# Patient Record
Sex: FEMALE | Race: WHITE | NOT HISPANIC OR LATINO | Employment: OTHER | URBAN - METROPOLITAN AREA
[De-identification: names, ages, dates, MRNs, and addresses within clinical notes are randomized per-mention and may not be internally consistent; named-entity substitution may affect disease eponyms.]

---

## 2017-01-31 ENCOUNTER — TRANSCRIBE ORDERS (OUTPATIENT)
Dept: LAB | Facility: CLINIC | Age: 52
End: 2017-01-31

## 2017-01-31 ENCOUNTER — APPOINTMENT (OUTPATIENT)
Dept: LAB | Facility: CLINIC | Age: 52
End: 2017-01-31
Payer: COMMERCIAL

## 2017-01-31 DIAGNOSIS — E03.9 HYPOTHYROIDISM: ICD-10-CM

## 2017-01-31 DIAGNOSIS — I42.9 CARDIOMYOPATHY (HCC): ICD-10-CM

## 2017-01-31 LAB
ALBUMIN SERPL BCP-MCNC: 3.7 G/DL (ref 3.5–5)
ALP SERPL-CCNC: 42 U/L (ref 46–116)
ALT SERPL W P-5'-P-CCNC: 24 U/L (ref 12–78)
ANION GAP SERPL CALCULATED.3IONS-SCNC: 7 MMOL/L (ref 4–13)
AST SERPL W P-5'-P-CCNC: 15 U/L (ref 5–45)
BASOPHILS # BLD AUTO: 0.06 THOUSANDS/ΜL (ref 0–0.1)
BASOPHILS NFR BLD AUTO: 1 % (ref 0–1)
BILIRUB SERPL-MCNC: 0.51 MG/DL (ref 0.2–1)
BUN SERPL-MCNC: 12 MG/DL (ref 5–25)
CALCIUM SERPL-MCNC: 8.5 MG/DL (ref 8.3–10.1)
CHLORIDE SERPL-SCNC: 105 MMOL/L (ref 100–108)
CO2 SERPL-SCNC: 26 MMOL/L (ref 21–32)
CREAT SERPL-MCNC: 0.71 MG/DL (ref 0.6–1.3)
EOSINOPHIL # BLD AUTO: 0.29 THOUSAND/ΜL (ref 0–0.61)
EOSINOPHIL NFR BLD AUTO: 3 % (ref 0–6)
ERYTHROCYTE [DISTWIDTH] IN BLOOD BY AUTOMATED COUNT: 13.2 % (ref 11.6–15.1)
GFR SERPL CREATININE-BSD FRML MDRD: >60 ML/MIN/1.73SQ M
GLUCOSE SERPL-MCNC: 80 MG/DL (ref 65–140)
HCT VFR BLD AUTO: 38.1 % (ref 34.8–46.1)
HGB BLD-MCNC: 12.5 G/DL (ref 11.5–15.4)
LYMPHOCYTES # BLD AUTO: 2.29 THOUSANDS/ΜL (ref 0.6–4.47)
LYMPHOCYTES NFR BLD AUTO: 23 % (ref 14–44)
MCH RBC QN AUTO: 31.4 PG (ref 26.8–34.3)
MCHC RBC AUTO-ENTMCNC: 32.8 G/DL (ref 31.4–37.4)
MCV RBC AUTO: 96 FL (ref 82–98)
MONOCYTES # BLD AUTO: 1.04 THOUSAND/ΜL (ref 0.17–1.22)
MONOCYTES NFR BLD AUTO: 10 % (ref 4–12)
NEUTROPHILS # BLD AUTO: 6.46 THOUSANDS/ΜL (ref 1.85–7.62)
NEUTS SEG NFR BLD AUTO: 63 % (ref 43–75)
NRBC BLD AUTO-RTO: 0 /100 WBCS
PLATELET # BLD AUTO: 354 THOUSANDS/UL (ref 149–390)
PMV BLD AUTO: 11.3 FL (ref 8.9–12.7)
POTASSIUM SERPL-SCNC: 3.6 MMOL/L (ref 3.5–5.3)
PROT SERPL-MCNC: 7.6 G/DL (ref 6.4–8.2)
RBC # BLD AUTO: 3.98 MILLION/UL (ref 3.81–5.12)
SODIUM SERPL-SCNC: 138 MMOL/L (ref 136–145)
TSH SERPL DL<=0.05 MIU/L-ACNC: 2.51 UIU/ML (ref 0.36–3.74)
WBC # BLD AUTO: 10.16 THOUSAND/UL (ref 4.31–10.16)

## 2017-01-31 PROCEDURE — 80053 COMPREHEN METABOLIC PANEL: CPT

## 2017-01-31 PROCEDURE — 36415 COLL VENOUS BLD VENIPUNCTURE: CPT

## 2017-01-31 PROCEDURE — 84443 ASSAY THYROID STIM HORMONE: CPT

## 2017-01-31 PROCEDURE — 85025 COMPLETE CBC W/AUTO DIFF WBC: CPT

## 2017-02-06 ENCOUNTER — APPOINTMENT (OUTPATIENT)
Dept: ULTRASOUND IMAGING | Facility: CLINIC | Age: 52
End: 2017-02-06
Payer: COMMERCIAL

## 2017-02-10 ENCOUNTER — ALLSCRIPTS OFFICE VISIT (OUTPATIENT)
Dept: OTHER | Facility: OTHER | Age: 52
End: 2017-02-10

## 2017-07-13 ENCOUNTER — ALLSCRIPTS OFFICE VISIT (OUTPATIENT)
Dept: OTHER | Facility: OTHER | Age: 52
End: 2017-07-13

## 2017-07-13 DIAGNOSIS — E03.9 HYPOTHYROIDISM: ICD-10-CM

## 2017-07-13 DIAGNOSIS — I42.9 CARDIOMYOPATHY (HCC): ICD-10-CM

## 2017-07-14 ENCOUNTER — TRANSCRIBE ORDERS (OUTPATIENT)
Dept: ADMINISTRATIVE | Facility: HOSPITAL | Age: 52
End: 2017-07-14

## 2017-07-14 DIAGNOSIS — I42.9 CARDIOMYOPATHY, UNSPECIFIED TYPE (HCC): Primary | ICD-10-CM

## 2017-07-17 ENCOUNTER — ALLSCRIPTS OFFICE VISIT (OUTPATIENT)
Dept: OTHER | Facility: OTHER | Age: 52
End: 2017-07-17

## 2017-08-01 DIAGNOSIS — I42.9 CARDIOMYOPATHY (HCC): ICD-10-CM

## 2017-08-01 DIAGNOSIS — E03.9 HYPOTHYROIDISM: ICD-10-CM

## 2017-08-03 ENCOUNTER — HOSPITAL ENCOUNTER (OUTPATIENT)
Dept: NON INVASIVE DIAGNOSTICS | Facility: CLINIC | Age: 52
Discharge: HOME | End: 2017-08-03
Payer: COMMERCIAL

## 2017-08-03 DIAGNOSIS — I42.9 CARDIOMYOPATHY (HCC): ICD-10-CM

## 2017-08-03 PROCEDURE — 93306 TTE W/DOPPLER COMPLETE: CPT

## 2017-09-14 ENCOUNTER — GENERIC CONVERSION - ENCOUNTER (OUTPATIENT)
Dept: OTHER | Facility: OTHER | Age: 52
End: 2017-09-14

## 2017-09-16 ENCOUNTER — APPOINTMENT (OUTPATIENT)
Dept: LAB | Facility: CLINIC | Age: 52
End: 2017-09-16
Payer: COMMERCIAL

## 2018-01-12 VITALS — DIASTOLIC BLOOD PRESSURE: 72 MMHG | SYSTOLIC BLOOD PRESSURE: 120 MMHG | HEIGHT: 64 IN

## 2018-01-12 VITALS
SYSTOLIC BLOOD PRESSURE: 102 MMHG | DIASTOLIC BLOOD PRESSURE: 70 MMHG | HEART RATE: 72 BPM | WEIGHT: 149 LBS | BODY MASS INDEX: 25.44 KG/M2 | HEIGHT: 64 IN

## 2018-01-12 NOTE — MISCELLANEOUS
Message   Recorded as Task   Date: 07/03/2016 07:33 AM, Created By: Samara Gamez   Task Name: Go to Result   Assigned To: Arlette Lange   Regarding Patient: Johanny Ayon, Status: In Progress   Keshia Stanley - 03 Jul 2016 7:33 AM     TASK CREATED  please call Becky Marin- all biopsies are normal   no evidence of pre cancerous lesions  plan for pap smeare next year   [facility] - 05 Jul 2016 8:58 AM     TASK IN PROGRESS   [facility] - 05 Jul 2016 8:59 AM     TASK EDITED  made pt aware  Active Problems    1  Acquired hypothyroidism (244 9) (E03 9)   2  Allergic rhinitis (477 9) (J30 9)   3  Asthma (493 90) (J45 909)   4  Cardiomyopathy (425 4) (I42 9)   5  Encounter for IUD removal (V25 12) (Z30 432)   6  Encounter for routine gynecological examination with Papanicolaou smear of cervix   (V72 31,V76 2) (Z01 419)   7  High risk HPV infection (079 4) (A63 0)   8  Insomnia (780 52) (G47 00)   9  Left bundle-branch block (426 3) (I44 7)   10  Mitral regurgitation (424 0) (I34 0)   11  Need for pneumococcal vaccine (V03 82) (Z23)   12  Non-toxic multinodular goiter (241 1) (E04 2)   13  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)   14  Screening for HPV (human papillomavirus) (V73 81) (Z11 51)    Current Meds   1  Coreg CR 10 MG Oral Capsule Extended Release 24 Hour; TAKE 1 CAPSULE EVERY   MORNING WITH FOOD  Requested for: 44VGK7384; Last EV:50GTD4063 Ordered   2  Dulera 100-5 MCG/ACT Inhalation Aerosol; INHALE 2 PUFFS TWICE DAILY; Therapy: 14QQZ9548 to (Last Rx:15Jan2016)  Requested for: 16CVW5007 Ordered   3  Levothyroxine Sodium 75 MCG Oral Tablet (Synthroid); Take 1 tablet daily  Requested   for: 37EFD2405; Last SQ:45HLZ2375 Ordered   4  Montelukast Sodium 10 MG Oral Tablet; take 1 tablet by mouth every day; Therapy: 45SIY2915 to (Evaluate:30Oct2016)  Requested for: 86STS0521; Last   Rx:03Eki2001 Ordered   5  Multi-Vitamin TABS;    Therapy: (Recorded:01Biw7165) to Recorded 6  ProAir  (90 Base) MCG/ACT Inhalation Aerosol Solution; INHALE 1 TO 2 PUFFS   EVERY 4 TO 6 HOURS AS NEEDED; Therapy: 46DMX7723 to (Last Rx:15Jan2016)  Requested for: 91TYQ4700 Ordered   7  Ramipril 2 5 MG Oral Capsule; TAKE 1 CAPSULE DAILY; Therapy: 78ODS3907 to ([facility])  Requested for: 14EEC3760; Last   TR:07AHI2043 Ordered    Allergies    1   No Known Drug Allergies    Signatures   Electronically signed by : Kristy Pierce LPN; Jul 5 4186  7:41CX EST                       (Author)

## 2018-01-14 VITALS
HEIGHT: 64 IN | WEIGHT: 148.56 LBS | SYSTOLIC BLOOD PRESSURE: 122 MMHG | DIASTOLIC BLOOD PRESSURE: 70 MMHG | BODY MASS INDEX: 25.36 KG/M2 | HEART RATE: 88 BPM

## 2018-01-14 NOTE — RESULT NOTES
Message  need colposcopy dud to hpv + x 2 years      Verified Results  (B) PAP DEPENDENT HPV COTEST >= 27YEARS OLD [id number] 03:17PM [place]     Test Name Result Flag Reference   PAP, LIQUID-BASED NILM     DIAGNOSIS:            Negative for intraepithelial lesion or malignancy  ADEQUACY:             Satisfactory for evaluation /                         Endocervical/transformation zone component                         present  COMMENT:              This Pap smear was screened with the assistance                         of the [employer] OutdoorPrep(TM) Imaging System and                         screened by a cytotechnologist   SPECIMEN SOURCE:      Pap Dependent HPV Cotest >= 27years old, CERVIX  CLINICAL INFORMATION: LMP: 5/04/2016                        Provided Diagnosis Codes: Z01 419,Z11 51                                                Cervicovaginal cytology should be considered a                         screening procedure subject to false negatives                         and false positives  Results are more reliable                         when a satisfactory sample is obtained on a                         regular repetitive basis, and should be                         interpreted together with past and current                         clinical data  ELECTRONICALLY SIGNED   BY:                   Rescreened By: AMITA Hurley (ASCP)   Case                         Electronically Signed 05/24/2016                          CASE COMMENTS:        The specificity and positive predictive value of                         high risk HPV for FREIDA 2+ on biopsy varies                         according to different studies; in one study it                         is approximately 30-40%, depending on the                         population juan jose Neumann, Cancer                         [employer], Nov  2008)                             Therefore, cytology may be negative in the setting of HPV infection  Follow up as per ASCCP                         guidelines  HPV HR (NON 16/18) Not Detected     HPV 18+ Not Detected     HPV16+ Detected A    HPV HR(NON 16/18) (1,2,3,4)  HPV 18+ (1,2,3,4)  HPV16+ (1,3,4)  (1)  The bhavin(R) HPV test is FDA-cleared for ThinPrep(R) specimens and   detects genomic HPV DNA in the polymorphic L1 region in 14 subtypes:   Type 16, Type 18, and other high risk types   (87,89,32,13,27,24,93,99,89,37,69,40)  The test has been modified and   validated for use in SurePath(TM) specimens  (2)  HPV types 16 and/or 18 that were Not Detected were undetectable or   below the pre-set threshold  (3)  The non-repeat rate for HPV genotyping assays varies from 5 to 15%  In   the NILM cytology category, there is a low positive predictive value   (PPV = 15-20%) for CIN2+ with a positive high risk HPV result  (4)  Results should be interpreted together with past and current clinical   and laboratory data         Signatures   Electronically signed by : Luciana Okeefe MD; May 25 2016  8:52AM EST                       (Author)

## 2018-01-16 NOTE — RESULT NOTES
Verified Results  (1) TSH [id number] 08:29AM Travisrj Gaspar Order Number: [id number]     Test Name Result Flag Reference   TSH 1 750 uIU/mL  0 358-3 740   Patients undergoing fluorescein dye angiography may retain small amounts of fluorescein in the body for 48-72 hours post procedure  Samples containing fluorescein can produce falsely depressed TSH values  If the patient had this procedure,a specimen should be resubmitted post fluorescein clearance            The recommended reference ranges for TSH during pregnancy are as follows:  First trimester 0 1 to 2 5 uIU/mL  Second trimester  0 2 to 3 0 uIU/mL  Third trimester 0 3 to 3 0 uIU/m

## 2018-03-07 NOTE — PROGRESS NOTES
History of Present Illness    Revaccination   Vaccine Information: Vaccine(s) Given (names): pneumovax  Spoke with patient regarding vaccine out of temperature range and risks and benefits of revaccination  Action(s): Pt will be revaccinated  Revaccination Completed: [id number]  Active Problems    1  Acquired hypothyroidism (244 9) (E03 9)   2  Allergic rhinitis (477 9) (J30 9)   3  Asthma (493 90) (J45 909)   4  Cardiomyopathy (425 4) (I42 9)   5  Encounter for IUD removal (V25 12) (Z30 432)   6  Encounter for routine gynecological examination with Papanicolaou smear of cervix   (V72 31,V76 2) (Z01 419)   7  High risk HPV infection (079 4) (A63 0)   8  Insomnia (780 52) (G47 00)   9  Left bundle-branch block (426 3) (I44 7)   10  Need for pneumococcal vaccine (V03 82) (Z23)   11  Need for revaccination (V05 9) (Z23)   12  Nonrheumatic mitral valve regurgitation (424 0) (I34 0)   13  Non-toxic multinodular goiter (241 1) (E04 2)   14  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)   15  Screening for HPV (human papillomavirus) (V73 81) (Z11 51)    Immunizations  Influenza --- Gypsy Lonnie: 01-Sep-2015   PCV --- Gypsy Lonnie: 10-Joaquin-2016   PPSV --- Gypsy Lonnie: 04-Feb-2016; Series2: Pt states 10+ yrs ago   Tdap --- Gypsy Lonnie: Unknown     Current Meds   1  Coreg CR 10 MG Oral Capsule Extended Release 24 Hour; TAKE 1 CAPSULE EVERY   MORNING WITH FOOD   2  Dulera 100-5 MCG/ACT Inhalation Aerosol; INHALE 2 PUFFS TWICE DAILY   3  Levothyroxine Sodium 75 MCG Oral Tablet; Take 1 tablet daily   4  Lisinopril 2 5 MG Oral Tablet; take one tablet by mouth every day   5  Montelukast Sodium 10 MG Oral Tablet; take 1 tablet by mouth every day   6  Multi-Vitamin TABS   7  ProAir  (90 Base) MCG/ACT Inhalation Aerosol Solution; INHALE 1 TO 2 PUFFS   EVERY 4 TO 6 HOURS AS NEEDED    Allergies    1  No Known Drug Allergies    Vitals  Signs   Recorded: 54WAP5802 03:43PM   Temperature: 98 1 F    Plan    1   RVAC-Pneumovax 23 25 MCG/0 5ML Injection Injectable    Education  Education Items with no Session   RVAC-Pneumovax 23 25 MCG/0 5ML Injection Injectable;  Provided: 26XZF4177  10:25AM; Counselor: Dwaine Borrego; Future Appointments    Date/Time Provider Specialty Site   06/09/2017 08:30 AM PARAG Villarreal  Cardiology [facility]   02/10/2017 10:15 AM PARAG Grimm   [facility] [address] [facility]   06/07/2017 09:45 AM Joyce Padron MD Obstetrics/Gynecology [facility]     Signatures   Electronically signed by : PARAG Fontana ; Dec 21 2016 10:55AM EST

## 2018-03-09 DIAGNOSIS — I10 HYPERTENSION, UNSPECIFIED TYPE: Primary | ICD-10-CM

## 2018-03-09 RX ORDER — LISINOPRIL 2.5 MG/1
2.5 TABLET ORAL DAILY
Qty: 90 TABLET | Refills: 0 | Status: SHIPPED | OUTPATIENT
Start: 2018-03-09

## 2018-03-09 NOTE — TELEPHONE ENCOUNTER
Fax requesting lisinopril  OLD jb6 patient has not established with new cardio  No appt scheduled  Pending 90w/0 refills  So patient can schedule f/u with new cardiologist

## 2018-07-23 RX ORDER — MONTELUKAST SODIUM 10 MG/1
TABLET ORAL
Qty: 90 TABLET | Refills: 3 | OUTPATIENT
Start: 2018-07-23

## 2021-04-13 DIAGNOSIS — Z23 ENCOUNTER FOR IMMUNIZATION: ICD-10-CM

## 2022-12-11 ENCOUNTER — APPOINTMENT (EMERGENCY)
Dept: CT IMAGING | Facility: HOSPITAL | Age: 57
End: 2022-12-11

## 2022-12-11 ENCOUNTER — HOSPITAL ENCOUNTER (EMERGENCY)
Facility: HOSPITAL | Age: 57
Discharge: HOME | End: 2022-12-11
Attending: EMERGENCY MEDICINE

## 2022-12-11 ENCOUNTER — APPOINTMENT (EMERGENCY)
Dept: RADIOLOGY | Facility: HOSPITAL | Age: 57
End: 2022-12-11

## 2022-12-11 VITALS
HEART RATE: 90 BPM | SYSTOLIC BLOOD PRESSURE: 139 MMHG | HEIGHT: 66 IN | TEMPERATURE: 98.9 F | DIASTOLIC BLOOD PRESSURE: 70 MMHG | RESPIRATION RATE: 16 BRPM | WEIGHT: 147 LBS | BODY MASS INDEX: 23.63 KG/M2 | OXYGEN SATURATION: 99 %

## 2022-12-11 DIAGNOSIS — R07.9 CHEST PAIN: Primary | ICD-10-CM

## 2022-12-11 LAB
2HR DELTA HS TROPONIN: 1 NG/L
ALBUMIN SERPL BCP-MCNC: 4.3 G/DL (ref 3.5–5)
ALP SERPL-CCNC: 64 U/L (ref 34–104)
ALT SERPL W P-5'-P-CCNC: 13 U/L (ref 7–52)
ANION GAP SERPL CALCULATED.3IONS-SCNC: 1 MMOL/L (ref 4–13)
APTT PPP: 26 SECONDS (ref 23–37)
AST SERPL W P-5'-P-CCNC: 17 U/L (ref 13–39)
ATRIAL RATE: 93 BPM
BASOPHILS # BLD AUTO: 0.11 THOUSANDS/ÂΜL (ref 0–0.1)
BASOPHILS NFR BLD AUTO: 1 % (ref 0–1)
BILIRUB SERPL-MCNC: 0.37 MG/DL (ref 0.2–1)
BNP SERPL-MCNC: 124 PG/ML (ref 0–100)
BUN SERPL-MCNC: 16 MG/DL (ref 5–25)
CALCIUM SERPL-MCNC: 9.8 MG/DL (ref 8.4–10.2)
CARDIAC TROPONIN I PNL SERPL HS: 4 NG/L
CARDIAC TROPONIN I PNL SERPL HS: 5 NG/L
CHLORIDE SERPL-SCNC: 108 MMOL/L (ref 96–108)
CO2 SERPL-SCNC: 28 MMOL/L (ref 21–32)
CREAT SERPL-MCNC: 0.9 MG/DL (ref 0.6–1.3)
D DIMER PPP FEU-MCNC: 0.75 UG/ML FEU
EOSINOPHIL # BLD AUTO: 0.34 THOUSAND/ÂΜL (ref 0–0.61)
EOSINOPHIL NFR BLD AUTO: 4 % (ref 0–6)
ERYTHROCYTE [DISTWIDTH] IN BLOOD BY AUTOMATED COUNT: 13.2 % (ref 11.6–15.1)
FLUAV RNA RESP QL NAA+PROBE: NEGATIVE
FLUBV RNA RESP QL NAA+PROBE: NEGATIVE
GFR SERPL CREATININE-BSD FRML MDRD: 71 ML/MIN/1.73SQ M
GLUCOSE SERPL-MCNC: 93 MG/DL (ref 65–140)
HCT VFR BLD AUTO: 38.7 % (ref 34.8–46.1)
HGB BLD-MCNC: 12.4 G/DL (ref 11.5–15.4)
IMM GRANULOCYTES # BLD AUTO: 0.01 THOUSAND/UL (ref 0–0.2)
IMM GRANULOCYTES NFR BLD AUTO: 0 % (ref 0–2)
INR PPP: 1 (ref 0.84–1.19)
LIPASE SERPL-CCNC: 8 U/L (ref 11–82)
LYMPHOCYTES # BLD AUTO: 2.79 THOUSANDS/ÂΜL (ref 0.6–4.47)
LYMPHOCYTES NFR BLD AUTO: 34 % (ref 14–44)
MCH RBC QN AUTO: 30.6 PG (ref 26.8–34.3)
MCHC RBC AUTO-ENTMCNC: 32 G/DL (ref 31.4–37.4)
MCV RBC AUTO: 96 FL (ref 82–98)
MONOCYTES # BLD AUTO: 0.85 THOUSAND/ÂΜL (ref 0.17–1.22)
MONOCYTES NFR BLD AUTO: 10 % (ref 4–12)
NEUTROPHILS # BLD AUTO: 4.16 THOUSANDS/ÂΜL (ref 1.85–7.62)
NEUTS SEG NFR BLD AUTO: 51 % (ref 43–75)
NRBC BLD AUTO-RTO: 0 /100 WBCS
P AXIS: 85 DEGREES
PLATELET # BLD AUTO: 332 THOUSANDS/UL (ref 149–390)
PMV BLD AUTO: 10.6 FL (ref 8.9–12.7)
POTASSIUM SERPL-SCNC: 4 MMOL/L (ref 3.5–5.3)
PR INTERVAL: 200 MS
PROT SERPL-MCNC: 7.6 G/DL (ref 6.4–8.4)
PROTHROMBIN TIME: 13.4 SECONDS (ref 11.6–14.5)
QRS AXIS: -22 DEGREES
QRSD INTERVAL: 86 MS
QT INTERVAL: 356 MS
QTC INTERVAL: 442 MS
RBC # BLD AUTO: 4.05 MILLION/UL (ref 3.81–5.12)
RSV RNA RESP QL NAA+PROBE: NEGATIVE
SARS-COV-2 RNA RESP QL NAA+PROBE: NEGATIVE
SODIUM SERPL-SCNC: 137 MMOL/L (ref 135–147)
T WAVE AXIS: 30 DEGREES
VENTRICULAR RATE: 93 BPM
WBC # BLD AUTO: 8.26 THOUSAND/UL (ref 4.31–10.16)

## 2022-12-11 RX ORDER — CARVEDILOL 12.5 MG/1
12.5 TABLET ORAL 2 TIMES DAILY
COMMUNITY
Start: 2022-09-06

## 2022-12-11 RX ORDER — LISINOPRIL 2.5 MG/1
1 TABLET ORAL 2 TIMES DAILY
COMMUNITY
Start: 2016-12-09

## 2022-12-11 RX ORDER — MONTELUKAST SODIUM 10 MG/1
1 TABLET ORAL
COMMUNITY
Start: 2016-02-03

## 2022-12-11 RX ORDER — FEXOFENADINE HCL AND PSEUDOEPHEDRINE HCI 60; 120 MG/1; MG/1
1 TABLET, EXTENDED RELEASE ORAL 2 TIMES DAILY
COMMUNITY

## 2022-12-11 RX ORDER — ALBUTEROL SULFATE 90 UG/1
2 AEROSOL, METERED RESPIRATORY (INHALATION) EVERY 6 HOURS PRN
COMMUNITY
Start: 2016-01-15

## 2022-12-11 RX ORDER — LEVOTHYROXINE SODIUM 0.07 MG/1
1 TABLET ORAL DAILY
COMMUNITY
Start: 2017-07-19

## 2022-12-11 RX ADMIN — IOHEXOL 100 ML: 350 INJECTION, SOLUTION INTRAVENOUS at 17:25

## 2022-12-11 NOTE — ED PROVIDER NOTES
History  Chief Complaint   Patient presents with   • Chest Pain     Patient reports chest pressure and pain starting yesterday afternoon  Hx cardiomyopathy  with pacemaker placement  No meds pta  Reports pain is sharp, squeezing, and intermittent  Increasing in intensity throughout this evening  63yo F w/ h/o HFrEF, cardiomyopathy, pacemaker, PE, CA, and asthma presenting for CP  1d ago, while at rest, Pt developed sharp/squeezing subxyphoid CP radiating to the back/right shoulder lasting <20s before self resolving  Associated with SOB, diaphoresis, nausea, lightheadedness  Finds her CP and SOB worse with laying flat, and deep breathing  Denies trauma, F/C, h/o abd surgeries, recent illness, sick contact, AC use, tobacco use, recent heavy lifting, worsening of her chronic leg swelling, calf pain  History provided by:  Patient      Prior to Admission Medications   Prescriptions Last Dose Informant Patient Reported? Taking? Multiple Vitamin (MULTI-DAY PO)   Yes Yes   Sig: Take 1 tablet by mouth in the morning   albuterol (PROVENTIL HFA,VENTOLIN HFA) 90 mcg/act inhaler   Yes Yes   Sig: Inhale 2 puffs every 6 (six) hours as needed   carvedilol (COREG) 12 5 mg tablet   Yes Yes   Sig: Take 12 5 mg by mouth 2 (two) times a day   fexofenadine-pseudoephedrine (ALLEGRA-D)  MG per tablet   Yes Yes   Sig: Take 1 tablet by mouth 2 (two) times a day   levothyroxine 75 mcg tablet   Yes Yes   Sig: Take 1 tablet by mouth daily   lisinopril (ZESTRIL) 2 5 mg tablet   Yes Yes   Sig: Take 1 tablet by mouth 2 (two) times a day   mometasone-formoterol (Dulera) 100-5 MCG/ACT inhaler   Yes Yes   Sig: Inhale 2 puffs 2 (two) times a day   montelukast (SINGULAIR) 10 mg tablet   Yes Yes   Sig: Take 1 tablet by mouth daily at bedtime      Facility-Administered Medications: None       Past Medical History:   Diagnosis Date   • Asthma    • Cancer ([facility] )    • Cardiomyopathy ([facility] )    • Pacemaker        History reviewed   No pertinent surgical history  History reviewed  No pertinent family history  I have reviewed and agree with the history as documented  E-Cigarette/Vaping     E-Cigarette/Vaping Substances     Social History     Tobacco Use   • Smoking status: Never   • Smokeless tobacco: Never   Substance Use Topics   • Alcohol use: Not Currently   • Drug use: Never        Review of Systems   Constitutional: Positive for diaphoresis  Negative for activity change, appetite change and fatigue  HENT: Negative  Eyes: Negative  Respiratory: Positive for shortness of breath  Negative for cough and chest tightness  Cardiovascular: Positive for chest pain  Negative for palpitations  Gastrointestinal: Positive for nausea  Genitourinary: Negative  Musculoskeletal: Negative  Skin: Negative  Neurological: Positive for light-headedness  Negative for weakness, numbness and headaches  Psychiatric/Behavioral: Negative  Physical Exam  ED Triage Vitals   Temperature Pulse Respirations Blood Pressure SpO2   12/11/22 1630 12/11/22 1559 12/11/22 1559 12/11/22 1559 12/11/22 1559   98 9 °F (37 2 °C) 100 20 147/76 99 %      Temp Source Heart Rate Source Patient Position - Orthostatic VS BP Location FiO2 (%)   12/11/22 1630 12/11/22 1559 12/11/22 1559 12/11/22 1559 --   Oral Monitor Sitting Right arm       Pain Score       12/11/22 1557       4             Orthostatic Vital Signs  Vitals:    12/11/22 1615 12/11/22 1630 12/11/22 1700 12/11/22 1830   BP: 144/72 133/61 113/56 139/70   Pulse: 93 82 83 90   Patient Position - Orthostatic VS: Sitting   Sitting       Physical Exam  Constitutional:       General: She is in acute distress  Appearance: Normal appearance  HENT:      Head: Normocephalic and atraumatic  Right Ear: External ear normal       Left Ear: External ear normal       Nose: Nose normal  No rhinorrhea  Mouth/Throat:      Mouth: Mucous membranes are moist       Pharynx: Oropharynx is clear     Eyes: Extraocular Movements: Extraocular movements intact  Conjunctiva/sclera: Conjunctivae normal       Pupils: Pupils are equal, round, and reactive to light  Cardiovascular:      Rate and Rhythm: Normal rate and regular rhythm  Pulses: Normal pulses  Heart sounds: Normal heart sounds  Pulmonary:      Effort: Pulmonary effort is normal       Breath sounds: Normal breath sounds  Chest:      Chest wall: Tenderness present  Abdominal:      General: Abdomen is flat  Tenderness: There is abdominal tenderness (epigastric)  Musculoskeletal:         General: No tenderness  Right lower leg: Edema (unchanged from baseline) present  Left lower leg: Edema (unchanged from baseline) present  Skin:     General: Skin is warm and dry  Capillary Refill: Capillary refill takes less than 2 seconds  Neurological:      General: No focal deficit present  Mental Status: She is alert and oriented to person, place, and time  Cranial Nerves: No cranial nerve deficit  Sensory: No sensory deficit  Motor: No weakness        Gait: Gait normal    Psychiatric:         Mood and Affect: Mood normal          Behavior: Behavior normal          ED Medications  Medications   iohexol (OMNIPAQUE) 350 MG/ML injection (MULTI-DOSE) 100 mL (100 mL Intravenous Given 12/11/22 1725)       Diagnostic Studies  Results Reviewed     Procedure Component Value Units Date/Time    HS Troponin I 2hr [[id number]]  (Normal) Collected: 12/11/22 1851    Lab Status: Final result Specimen: Blood from Arm, Left Updated: 12/11/22 1923     hs TnI 2hr 5 ng/L      Delta 2hr hsTnI 1 ng/L     B-Type Natriuretic Peptide(BNP) AN, CA, EA Campuses Only [[id number]]  (Abnormal) Collected: 12/11/22 1600    Lab Status: Final result Specimen: Blood from Arm, Left Updated: 12/11/22 1704      pg/mL     FLU/RSV/COVID - if FLU/RSV clinically relevant [[id number]]  (Normal) Collected: 12/11/22 1600    Lab Status: Final result Specimen: Nares from Nose Updated: 12/11/22 1702     SARS-CoV-2 Negative     INFLUENZA A PCR Negative     INFLUENZA B PCR Negative     RSV PCR Negative    Narrative:      FOR PEDIATRIC PATIENTS - copy/paste COVID Guidelines URL to browser: [email or web address]    SARS-CoV-2 assay is a Nucleic Acid Amplification assay intended for the  qualitative detection of nucleic acid from SARS-CoV-2 in nasopharyngeal  swabs  Results are for the presumptive identification of SARS-CoV-2 RNA  Positive results are indicative of infection with SARS-CoV-2, the virus  causing COVID-19, but do not rule out bacterial infection or co-infection  with other viruses  Laboratories within the [place] and its  territories are required to report all positive results to the appropriate  public health authorities  Negative results do not preclude SARS-CoV-2  infection and should not be used as the sole basis for treatment or other  patient management decisions  Negative results must be combined with  clinical observations, patient history, and epidemiological information  This test has not been FDA cleared or approved  This test has been authorized by FDA under an Emergency Use Authorization  (EUA)  This test is only authorized for the duration of time the  declaration that circumstances exist justifying the authorization of the  emergency use of an in vitro diagnostic tests for detection of SARS-CoV-2  virus and/or diagnosis of COVID-19 infection under section 564(b)(1) of  the Act, 21 U  S C  354QCP-9(C)(0), unless the authorization is terminated  or revoked sooner  The test has been validated but independent review by FDA  and CLIA is pending  Test performed using [employer] GeneXpert: This RT-PCR assay targets N2,  a region unique to SARS-CoV-2  A conserved region in the E-gene was chosen  for pan-Sarbecovirus detection which includes SARS-CoV-2      According to CMS-2020-01-R, this platform meets the definition of high-CoContest technology  Comprehensive metabolic panel [[id number]]  (Abnormal) Collected: 12/11/22 1600    Lab Status: Final result Specimen: Blood from Arm, Left Updated: 12/11/22 1652     Sodium 137 mmol/L      Potassium 4 0 mmol/L      Chloride 108 mmol/L      CO2 28 mmol/L      ANION GAP 1 mmol/L      BUN 16 mg/dL      Creatinine 0 90 mg/dL      Glucose 93 mg/dL      Calcium 9 8 mg/dL      AST 17 U/L      ALT 13 U/L      Alkaline Phosphatase 64 U/L      Total Protein 7 6 g/dL      Albumin 4 3 g/dL      Total Bilirubin 0 37 mg/dL      eGFR 71 ml/min/1 73sq m     Narrative:      [place] guidelines for Chronic Kidney Disease (CKD):   •  Stage 1 with normal or high GFR (GFR > 90 mL/min/1 73 square meters)  •  Stage 2 Mild CKD (GFR = 60-89 mL/min/1 73 square meters)  •  Stage 3A Moderate CKD (GFR = 45-59 mL/min/1 73 square meters)  •  Stage 3B Moderate CKD (GFR = 30-44 mL/min/1 73 square meters)  •  Stage 4 Severe CKD (GFR = 15-29 mL/min/1 73 square meters)  •  Stage 5 End Stage CKD (GFR <15 mL/min/1 73 square meters)  Note: GFR calculation is accurate only with a steady state creatinine    Lipase [[id number]]  (Abnormal) Collected: 12/11/22 1600    Lab Status: Final result Specimen: Blood from Arm, Left Updated: 12/11/22 1652     Lipase 8 u/L     D-Dimer [[id number]]  (Abnormal) Collected: 12/11/22 1600    Lab Status: Final result Specimen: Blood from Arm, Left Updated: 12/11/22 1652     D-Dimer, Quant 0 75 ug/ml FEU     Narrative: In the evaluation for possible pulmonary embolism, in the appropriate (Well's Score of 4 or less) patient, the age adjusted d-dimer cutoff for this patient can be calculated as:    Age x 0 01 (in ug/mL) for Age-adjusted D-dimer exclusion threshold for a patient over 50 years      HS Troponin 0hr (reflex protocol) [[id number]]  (Normal) Collected: 12/11/22 1600    Lab Status: Final result Specimen: Blood from Arm, Left Updated: 12/11/22 1635     hs TnI 0hr 4 ng/L     HS Troponin I 4hr [[id number]]     Lab Status: No result Specimen: Blood from Arm, Left     Protime-INR [[id number]]  (Normal) Collected: 12/11/22 1600    Lab Status: Final result Specimen: Blood from Arm, Left Updated: 12/11/22 1623     Protime 13 4 seconds      INR 1 00    APTT [[id number]]  (Normal) Collected: 12/11/22 1600    Lab Status: Final result Specimen: Blood from Arm, Left Updated: 12/11/22 1623     PTT 26 seconds     CBC and differential [[id number]]  (Abnormal) Collected: 12/11/22 1600    Lab Status: Final result Specimen: Blood from Arm, Left Updated: 12/11/22 1611     WBC 8 26 Thousand/uL      RBC 4 05 Million/uL      Hemoglobin 12 4 g/dL      Hematocrit 38 7 %      MCV 96 fL      MCH 30 6 pg      MCHC 32 0 g/dL      RDW 13 2 %      MPV 10 6 fL      Platelets 235 Thousands/uL      nRBC 0 /100 WBCs      Neutrophils Relative 51 %      Immat GRANS % 0 %      Lymphocytes Relative 34 %      Monocytes Relative 10 %      Eosinophils Relative 4 %      Basophils Relative 1 %      Neutrophils Absolute 4 16 Thousands/µL      Immature Grans Absolute 0 01 Thousand/uL      Lymphocytes Absolute 2 79 Thousands/µL      Monocytes Absolute 0 85 Thousand/µL      Eosinophils Absolute 0 34 Thousand/µL      Basophils Absolute 0 11 Thousands/µL                  CTA dissection protocol chest/abdomen/pelvis   Final Result by Geovany Howell MD (12/11 1824)      No evidence of aortic dissection or aortic aneurysm  Nonobstructing right renal calculus  Workstation performed: [id number]         XR chest 1 view portable   ED Interpretation by Funmilayo Medeiros MD (12/11 7209)   No evidence of acute cardiopulmonary pathology              Procedures  ECG 12 Lead Documentation Only    Date/Time: 12/11/2022 4:56 PM  Performed by: Funmilayo Medeiros MD  Authorized by: Funmilayo Medeiros MD     ECG reviewed by me, the ED Provider: yes    Patient location:  ED  Interpretation: Interpretation: abnormal    Rate:     ECG rate:  93    ECG rate assessment: normal    Rhythm:     Rhythm: paced    Pacing:     Capture:  Complete    Type of pacing:  Ventricular  Ectopy:     Ectopy: none    QRS:     QRS axis:  Normal    QRS intervals:  Normal  Conduction:     Conduction: abnormal      Abnormal conduction: incomplete RBBB    ST segments:     ST segments:  Normal  T waves:     T waves: normal            ED Course             HEART Risk Score    Flowsheet Row Most Recent Value   Heart Score Risk Calculator    History 1 Filed at: 12/11/2022 2057   ECG 0 Filed at: 12/11/2022 2057   Age 1 Filed at: 12/11/2022 2057   Risk Factors 2 Filed at: 12/11/2022 2057   Troponin 0 Filed at: 12/11/2022 2057   HEART Score 4 Filed at: 12/11/2022 2057                      SBIRT 20yo+    Flowsheet Row Most Recent Value   SBIRT (25 yo +)    In order to provide better care to our patients, we are screening all of our patients for alcohol and drug use  Would it be okay to ask you these screening questions? Unable to answer at this time Filed at: 12/11/2022 1603          Wells' Criteria for PE    Flowsheet Row Most Recent Value   Wells' Criteria for PE    Clinical signs and symptoms of DVT 3 Filed at: 12/11/2022 1620   PE is primary diagnosis or equally likely 0 Filed at: 12/11/2022 1620   HR >100 0 Filed at: 12/11/2022 1620   Immobilization at least 3 days or Surgery in the previous 4 weeks 0 Filed at: 12/11/2022 1620   Previous, objectively diagnosed PE or DVT 1 5 Filed at: 12/11/2022 1620   Hemoptysis 0 Filed at: 12/11/2022 1620   Malignancy with treatment within 6 months or palliative 0 Filed at: 12/11/2022 1620   Wells' Criteria Total 4 5 Filed at: 12/11/2022 1620            MDM  Number of Diagnoses or Management Options  Chest pain  Diagnosis management comments: Ddx includes but not limited to PE, CHF, aortic dissection, PNA, MSK pain, ACS, pericarditis, pancreatitis, PUD, gastritis      CT negative for PE/aortic dissection  CXR and labs negative for PNA  Troponin and BNP wnl making CHF and ACS less likely  Lipase wnl, making pancreatitis less likely  Pt likely with MSK-related pain  Pt states she feels better since being in the department and would like to go home  Advised Pt to f/u with her cardiologist  Appropriate for d/c home with strict return precautions  Disposition  Final diagnoses:   Chest pain     Time reflects when diagnosis was documented in both MDM as applicable and the Disposition within this note     Time User Action Codes Description Comment    12/11/2022  7:10 PM Mildred Grayson Add [R07 9] Chest pain       ED Disposition     ED Disposition   Discharge    Condition   Stable    Date/Time   Sun Dec 11, 2022  7:33 PM    Comment   Alba Fee discharge to home/self care                 Follow-up Information     Follow up With Specialties Details Why Contact Info Additional [address] Emergency Department Emergency Medicine Go to  If symptoms worsen [address] Emergency Department, [address], [place], [place], [address]          Discharge Medication List as of 12/11/2022  7:35 PM      CONTINUE these medications which have NOT CHANGED    Details   albuterol (PROVENTIL HFA,VENTOLIN HFA) 90 mcg/act inhaler Inhale 2 puffs every 6 (six) hours as needed, Starting Fri 1/15/2016, Historical Med      carvedilol (COREG) 12 5 mg tablet Take 12 5 mg by mouth 2 (two) times a day, Starting Tue 9/6/2022, Historical Med      fexofenadine-pseudoephedrine (ALLEGRA-D)  MG per tablet Take 1 tablet by mouth 2 (two) times a day, Historical Med      levothyroxine 75 mcg tablet Take 1 tablet by mouth daily, Starting Wed 7/19/2017, Historical Med      lisinopril (ZESTRIL) 2 5 mg tablet Take 1 tablet by mouth 2 (two) times a day, Starting Fri 12/9/2016, Historical Med mometasone-formoterol (Dulera) 100-5 MCG/ACT inhaler Inhale 2 puffs 2 (two) times a day, Starting Fri 1/15/2016, Historical Med      montelukast (SINGULAIR) 10 mg tablet Take 1 tablet by mouth daily at bedtime, Starting Wed 2/3/2016, Historical Med      Multiple Vitamin (MULTI-DAY PO) Take 1 tablet by mouth in the morning, Historical Med           No discharge procedures on file  PDMP Review     None           ED Provider  Attending physically available and evaluated Shira Nielsen I managed the patient along with the ED Attending      Electronically Signed by         Chidi Reyes MD  12/11/22 2739

## 2022-12-12 NOTE — DISCHARGE INSTRUCTIONS
Please follow up with your cardiologist and/or primary care doctor  Return to the ER if you develop:    Chest pain lasting >1 minute, shortness of breath, sweating, nausea, dizziness, numbness, swelling, or worsening of condition overall

## 2022-12-14 NOTE — ED ATTENDING ATTESTATION
12/11/2022  Any Tomlinson DO, saw and evaluated the patient  I have discussed the patient with the resident/non-physician practitioner and agree with the resident's/non-physician practitioner's findings, Plan of Care, and MDM as documented in the resident's/non-physician practitioner's note, except where noted  All available labs and Radiology studies were reviewed  I was present for key portions of any procedure(s) performed by the resident/non-physician practitioner and I was immediately available to provide assistance  At this point I agree with the current assessment done in the Emergency Department    I have conducted an independent evaluation of this patient a history and physical is as follows:    ED Course         Critical Care Time  Procedures

## 2022-12-17 LAB
ATRIAL RATE: 87 BPM
P AXIS: 79 DEGREES
PR INTERVAL: 212 MS
QRS AXIS: -20 DEGREES
QRSD INTERVAL: 96 MS
QT INTERVAL: 382 MS
QTC INTERVAL: 459 MS
T WAVE AXIS: 84 DEGREES
VENTRICULAR RATE: 87 BPM

## 2023-01-30 ENCOUNTER — TELEPHONE (OUTPATIENT)
Dept: INTERNAL MEDICINE CLINIC | Facility: CLINIC | Age: 58
End: 2023-01-30

## 2023-03-02 ENCOUNTER — OFFICE VISIT (OUTPATIENT)
Dept: INTERNAL MEDICINE CLINIC | Facility: CLINIC | Age: 58
End: 2023-03-02

## 2023-03-02 VITALS
HEIGHT: 66 IN | BODY MASS INDEX: 24.78 KG/M2 | HEART RATE: 88 BPM | DIASTOLIC BLOOD PRESSURE: 72 MMHG | TEMPERATURE: 98.4 F | WEIGHT: 154.2 LBS | RESPIRATION RATE: 16 BRPM | SYSTOLIC BLOOD PRESSURE: 116 MMHG | OXYGEN SATURATION: 96 %

## 2023-03-02 DIAGNOSIS — Z87.828 HISTORY OF MENISCAL TEAR: ICD-10-CM

## 2023-03-02 DIAGNOSIS — Z00.00 WELL ADULT EXAM: ICD-10-CM

## 2023-03-02 DIAGNOSIS — E03.9 ACQUIRED HYPOTHYROIDISM: ICD-10-CM

## 2023-03-02 DIAGNOSIS — Z95.0 PRESENCE OF PERMANENT CARDIAC PACEMAKER: ICD-10-CM

## 2023-03-02 DIAGNOSIS — I51.89 MILD LEFT VENTRICULAR SYSTOLIC DYSFUNCTION: ICD-10-CM

## 2023-03-02 DIAGNOSIS — I44.2 COMPLETE HEART BLOCK (HCC): ICD-10-CM

## 2023-03-02 DIAGNOSIS — I44.7 LEFT BUNDLE BRANCH BLOCK (LBBB): ICD-10-CM

## 2023-03-02 DIAGNOSIS — I34.0 NONRHEUMATIC MITRAL VALVE REGURGITATION: ICD-10-CM

## 2023-03-02 DIAGNOSIS — I27.82 CHRONIC PULMONARY EMBOLISM WITHOUT ACUTE COR PULMONALE, UNSPECIFIED PULMONARY EMBOLISM TYPE (HCC): ICD-10-CM

## 2023-03-02 DIAGNOSIS — E55.9 VITAMIN D DEFICIENCY: ICD-10-CM

## 2023-03-02 DIAGNOSIS — I42.7 CARDIOMYOPATHY DUE TO ANTHRACYCLINE (HCC): ICD-10-CM

## 2023-03-02 DIAGNOSIS — J45.20 MILD INTERMITTENT ASTHMA, UNSPECIFIED WHETHER COMPLICATED: ICD-10-CM

## 2023-03-02 DIAGNOSIS — I50.9: Primary | ICD-10-CM

## 2023-03-02 DIAGNOSIS — T45.1X5A CARDIOMYOPATHY DUE TO ANTHRACYCLINE (HCC): ICD-10-CM

## 2023-03-02 DIAGNOSIS — C50.919 BREAST CANCER, STAGE 2, UNSPECIFIED LATERALITY (HCC): ICD-10-CM

## 2023-03-02 DIAGNOSIS — K86.89 PANCREATIC MASS: ICD-10-CM

## 2023-03-02 DIAGNOSIS — M25.462 EFFUSION OF LEFT KNEE: ICD-10-CM

## 2023-03-02 DIAGNOSIS — C81.91 HODGKIN LYMPHOMA OF LYMPH NODES OF NECK, UNSPECIFIED HODGKIN LYMPHOMA TYPE (HCC): ICD-10-CM

## 2023-03-02 DIAGNOSIS — Z90.81 H/O SPLENECTOMY: ICD-10-CM

## 2023-03-02 PROBLEM — C81.90 HODGKIN LYMPHOMA (HCC): Status: ACTIVE | Noted: 2018-05-22

## 2023-03-02 PROBLEM — I26.99 PULMONARY EMBOLISM (HCC): Status: ACTIVE | Noted: 2018-10-27

## 2023-03-02 PROBLEM — N94.9 ADNEXAL CYST: Status: ACTIVE | Noted: 2018-07-12

## 2023-03-02 RX ORDER — LEVOTHYROXINE SODIUM 0.07 MG/1
75 TABLET ORAL DAILY
Qty: 90 TABLET | Refills: 1 | Status: SHIPPED | OUTPATIENT
Start: 2023-03-02

## 2023-03-02 RX ORDER — FLUTICASONE PROPIONATE AND SALMETEROL 250; 50 UG/1; UG/1
POWDER RESPIRATORY (INHALATION)
COMMUNITY

## 2023-03-02 NOTE — PATIENT INSTRUCTIONS
Reestablish primary care, chart reviewed and updated    Check labs, will follow accordingly    Left knee pain with remote history of meniscal tear and recurrent effusion with popliteal cyst-referral for physical therapy  If symptoms worsen or fail to improve we will pursue MRI and consideration for more definitive treatment

## 2023-03-02 NOTE — PROGRESS NOTES
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[facility] )    Left bundle branch block (LBBB)    Mild left ventricular systolic dysfunction    Nonrheumatic mitral valve regurgitation    Chronic pulmonary embolism without acute cor pulmonale, unspecified pulmonary embolism type (HCC)    Mild intermittent asthma, unspecified whether complicated    Acquired hypothyroidism  -     levothyroxine 75 mcg tablet; Take 1 tablet (75 mcg total) by mouth daily  -     TSH, 3rd generation with Free T4 reflex; Future    Presence of permanent cardiac pacemaker    Pancreatic mass    Hodgkin lymphoma of lymph nodes of neck, unspecified Hodgkin lymphoma type (HCC)    Breast cancer, stage 2, unspecified laterality (HCC)    History of meniscal tear  -     Ambulatory Referral to Physical Therapy; Future    Effusion of left knee  -     Ambulatory Referral to Physical Therapy; Future    Vitamin D deficiency  -     Vitamin D 25 hydroxy; Future    Well adult exam  -     CBC and differential; Future  -     Comprehensive metabolic panel; Future  -     Lipid panel; Future  -     Hemoglobin A1C; Future    Complete heart block (HCC)    H/O splenectomy    Other orders  -     Fluticasone-Salmeterol (Advair) 250-50 mcg/dose inhaler            Patient Instructions   Reestablish primary care, chart reviewed and updated    Check labs, will follow accordingly    Left knee pain with remote history of meniscal tear and recurrent effusion with popliteal cyst-referral for physical therapy  If symptoms worsen or fail to improve we will pursue MRI and consideration for more definitive treatment  Subjective:      Patient ID: Natanael Rojas is a 65 y o  female    Re [facility]  Living in [facility], plans to come closer in June, living w/ [place]  Self employed w/ consulting  Feeling physically well  Hodkins age '15 s/p XRT and splenectomy      H/o R Breast CA age 28 s/p b/l mastectomy d/t mantal radiation, BRCA negative  S/p saline, then silicone implants  No recent onc f/y  Adriamycin induced cardiomyopathy w/ reduced LVEF, 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%, LBBB now s/p pacer  10 yrs left on battery  MV regurg f/b HUP  Ralf Monge and Naveen  PPM c/b PE x 2 after first and second unclear etiol, but first was a 5 hr procedure  Tx'd Xarelto x 6 mos for both  L knee meniscal tear in 08 w/ exacerbation in 2021 w/ aggressive treadmill exercise  Now w/ baker cyst and recurrent swelling  Was doing PT, but had herniated disc and had to stop  Herniated disc f/b chiro about monthly and regular home exercises  Walks dog 1 mi daily ([place])  Asthma stable w/ wixela and singulair, no albuterol since fall  Colonoscopy 10/22 w/ polyps removed  GYN-11/22        Current Outpatient Medications:   •  albuterol (PROVENTIL HFA,VENTOLIN HFA) 90 mcg/act inhaler, Inhale 2 puffs every 6 (six) hours as needed, Disp: , Rfl:   •  carvedilol (COREG) 12 5 mg tablet, Take 12 5 mg by mouth 2 (two) times a day, Disp: , Rfl:   •  fexofenadine-pseudoephedrine (ALLEGRA-D)  MG per tablet, Take 1 tablet by mouth 2 (two) times a day, Disp: , Rfl:   •  Fluticasone-Salmeterol (Advair) 250-50 mcg/dose inhaler, , Disp: , Rfl:   •  levothyroxine 75 mcg tablet, Take 1 tablet (75 mcg total) by mouth daily, Disp: 90 tablet, Rfl: 1  •  lisinopril (ZESTRIL) 2 5 mg tablet, Take 1 tablet by mouth 2 (two) times a day, Disp: , Rfl:   •  montelukast (SINGULAIR) 10 mg tablet, Take 1 tablet by mouth daily at bedtime, Disp: , Rfl:   •  Multiple Vitamin (MULTI-DAY PO), Take 1 tablet by mouth in the morning, Disp: , Rfl:     No results found for this or any previous visit (from the past 1008 hour(s))      The following portions of the patient's history were reviewed and updated as appropriate: allergies, current medications, past family history, past medical history, past social history, past surgical history and problem list      Review of Systems      Objective:      Vitals:    03/02/23 1605   BP: 116/72   Pulse: 88   Resp: 16   Temp: 98 4 °F (36 9 °C)   SpO2: 96%          Physical Exam  Constitutional:       Appearance: She is well-developed  HENT:      Head: Normocephalic and atraumatic  Nose: Nose normal    Eyes:      General: No scleral icterus  Conjunctiva/sclera: Conjunctivae normal       Pupils: Pupils are equal, round, and reactive to light  Neck:      Thyroid: No thyromegaly  Vascular: No JVD  Trachea: No tracheal deviation  Cardiovascular:      Rate and Rhythm: Normal rate and regular rhythm  Heart sounds: Murmur heard  No friction rub  No gallop  Pulmonary:      Effort: Pulmonary effort is normal  No respiratory distress  Breath sounds: Normal breath sounds  No wheezing or rales  Abdominal:      General: Bowel sounds are normal  There is no distension  Palpations: Abdomen is soft  There is no mass  Tenderness: There is no abdominal tenderness  There is no guarding or rebound  Musculoskeletal:         General: No tenderness  Cervical back: Normal range of motion and neck supple  Comments: Left popliteal fullness, full rom, no ligamentous laxity   Lymphadenopathy:      Cervical: No cervical adenopathy  Skin:     General: Skin is warm and dry  Findings: No erythema or rash  Neurological:      Mental Status: She is alert and oriented to person, place, and time  Cranial Nerves: No cranial nerve deficit  Psychiatric:         Behavior: Behavior normal          Thought Content:  Thought content normal          Judgment: Judgment normal

## 2023-03-03 ENCOUNTER — APPOINTMENT (OUTPATIENT)
Dept: LAB | Age: 58
End: 2023-03-03

## 2023-03-03 DIAGNOSIS — E03.9 ACQUIRED HYPOTHYROIDISM: ICD-10-CM

## 2023-03-03 DIAGNOSIS — K86.2 PANCREATIC CYST: Primary | ICD-10-CM

## 2023-03-03 DIAGNOSIS — Z00.00 WELL ADULT EXAM: ICD-10-CM

## 2023-03-03 DIAGNOSIS — E55.9 VITAMIN D DEFICIENCY: ICD-10-CM

## 2023-03-03 PROBLEM — D25.1 INTRAMURAL LEIOMYOMA OF UTERUS: Status: ACTIVE | Noted: 2023-03-03

## 2023-03-03 PROBLEM — N94.9 ADNEXAL CYST: Status: RESOLVED | Noted: 2018-07-12 | Resolved: 2023-03-03

## 2023-03-03 LAB
ALBUMIN SERPL BCP-MCNC: 3.6 G/DL (ref 3.5–5)
ALP SERPL-CCNC: 70 U/L (ref 46–116)
ALT SERPL W P-5'-P-CCNC: 39 U/L (ref 12–78)
ANION GAP SERPL CALCULATED.3IONS-SCNC: 2 MMOL/L (ref 4–13)
AST SERPL W P-5'-P-CCNC: 19 U/L (ref 5–45)
BASOPHILS # BLD AUTO: 0.08 THOUSANDS/ÂΜL (ref 0–0.1)
BASOPHILS NFR BLD AUTO: 1 % (ref 0–1)
BILIRUB SERPL-MCNC: 0.48 MG/DL (ref 0.2–1)
BUN SERPL-MCNC: 15 MG/DL (ref 5–25)
CALCIUM SERPL-MCNC: 9.2 MG/DL (ref 8.3–10.1)
CHLORIDE SERPL-SCNC: 106 MMOL/L (ref 96–108)
CHOLEST SERPL-MCNC: 205 MG/DL
CO2 SERPL-SCNC: 28 MMOL/L (ref 21–32)
CREAT SERPL-MCNC: 0.73 MG/DL (ref 0.6–1.3)
EOSINOPHIL # BLD AUTO: 0.21 THOUSAND/ÂΜL (ref 0–0.61)
EOSINOPHIL NFR BLD AUTO: 3 % (ref 0–6)
ERYTHROCYTE [DISTWIDTH] IN BLOOD BY AUTOMATED COUNT: 13.1 % (ref 11.6–15.1)
EST. AVERAGE GLUCOSE BLD GHB EST-MCNC: 111 MG/DL
GFR SERPL CREATININE-BSD FRML MDRD: 92 ML/MIN/1.73SQ M
GLUCOSE P FAST SERPL-MCNC: 85 MG/DL (ref 65–99)
HBA1C MFR BLD: 5.5 %
HCT VFR BLD AUTO: 39.2 % (ref 34.8–46.1)
HDLC SERPL-MCNC: 57 MG/DL
HGB BLD-MCNC: 12.6 G/DL (ref 11.5–15.4)
IMM GRANULOCYTES # BLD AUTO: 0.01 THOUSAND/UL (ref 0–0.2)
IMM GRANULOCYTES NFR BLD AUTO: 0 % (ref 0–2)
LDLC SERPL CALC-MCNC: 136 MG/DL (ref 0–100)
LYMPHOCYTES # BLD AUTO: 1.96 THOUSANDS/ÂΜL (ref 0.6–4.47)
LYMPHOCYTES NFR BLD AUTO: 31 % (ref 14–44)
MCH RBC QN AUTO: 31.2 PG (ref 26.8–34.3)
MCHC RBC AUTO-ENTMCNC: 32.1 G/DL (ref 31.4–37.4)
MCV RBC AUTO: 97 FL (ref 82–98)
MONOCYTES # BLD AUTO: 0.76 THOUSAND/ÂΜL (ref 0.17–1.22)
MONOCYTES NFR BLD AUTO: 12 % (ref 4–12)
NEUTROPHILS # BLD AUTO: 3.34 THOUSANDS/ÂΜL (ref 1.85–7.62)
NEUTS SEG NFR BLD AUTO: 53 % (ref 43–75)
NONHDLC SERPL-MCNC: 148 MG/DL
NRBC BLD AUTO-RTO: 0 /100 WBCS
PLATELET # BLD AUTO: 351 THOUSANDS/UL (ref 149–390)
PMV BLD AUTO: 11 FL (ref 8.9–12.7)
POTASSIUM SERPL-SCNC: 4.1 MMOL/L (ref 3.5–5.3)
PROT SERPL-MCNC: 7.2 G/DL (ref 6.4–8.4)
RBC # BLD AUTO: 4.04 MILLION/UL (ref 3.81–5.12)
SODIUM SERPL-SCNC: 136 MMOL/L (ref 135–147)
TRIGL SERPL-MCNC: 60 MG/DL
TSH SERPL DL<=0.05 MIU/L-ACNC: 2.01 UIU/ML (ref 0.45–4.5)
WBC # BLD AUTO: 6.36 THOUSAND/UL (ref 4.31–10.16)

## 2023-03-04 LAB — 25(OH)D3 SERPL-MCNC: 21 NG/ML (ref 30–100)

## 2023-03-09 ENCOUNTER — EVALUATION (OUTPATIENT)
Dept: PHYSICAL THERAPY | Age: 58
End: 2023-03-09

## 2023-03-09 DIAGNOSIS — M25.462 EFFUSION OF LEFT KNEE: ICD-10-CM

## 2023-03-09 DIAGNOSIS — Z87.828 HISTORY OF MENISCAL TEAR: ICD-10-CM

## 2023-03-09 NOTE — PROGRESS NOTES
PT Evaluation     Today's date: 3/9/2023  Patient name: Gerardo Griffin  : 1965  MRN: [id number]  Referring provider: Alison Stahl MD  Dx:   Encounter Diagnosis     ICD-10-CM    1  History of meniscal tear  [id number] Ambulatory Referral to Physical Therapy      2  Effusion of left knee  [id number] Ambulatory Referral to Physical Therapy                     Assessment  Assessment details: Pt presents to therapy with left knee pain of 2 years from previous meniscal injury and current cyst on posterior aspect of knee that may have derived from arthritic changes in the knee  She demonstrates efficient active knee ROM, though presents with overall knee tightness and pain at the end range of both flexion and extension, limiting her ability to partake in any activities that involve flexion of the knee such as bicycling and repetitive stair negotiation  Pt would benefit from stretching and strengthening of the knee in order to better support the bones during mobility  PT discussed HEP with pt and recommended scheduling therapy as needed due to high co-pay  Impairments: abnormal or restricted ROM, impaired physical strength, lacks appropriate home exercise program and pain with function  Functional limitations: walking, bicycling, repeitive stair negotaiton, bending to clean house  Symptom irritability: lowUnderstanding of Dx/Px/POC: good   Prognosis: good    Goals  STG (4 weeks):  1  Become complaint with HEP in order for symptom relief  2  Decrease L knee pain from 6/10 to 3/10 in order to improve pt's ability to engage in outdoor activities with reduced pain  LTG (6 weeks):  1  Introduce proper body mechanics in order to prevent further knee injuries during functional activities  2  Decrease L knee pain from 6/10 to 0/10 in order to improve pt's ability to maintain active lifestyle without pain      Plan  Patient would benefit from: skilled physical therapy  Referral necessary: No  Planned modality interventions: cryotherapy, thermotherapy: hydrocollator packs and TENS  Planned therapy interventions: activity modification, body mechanics training, flexibility, functional ROM exercises, home exercise program, neuromuscular re-education, strengthening and stretching  Frequency: as needed  Treatment plan discussed with: patient        Subjective Evaluation    History of Present Illness  Date of onset: 3/9/2021  Mechanism of injury: Pt reports knee pain that started 2 years ago and presumes it stems from a torn mensicus that occurred in   She reports doing well post meniscal repair, but stopped exercising at the start of COVID  A few months later she bought a treadmill and overdid it as noted by pain post exercise  She presents with a cyst on the posterior aspect of her L knee and reports it feeling like a balloon with occasional numbness and tingling on the medial and lateral aspect of the knee  Pt reports activities such as walking, riding a bike, and bending to clean her house increase her symptoms of tightness/achy and swelling  Pt also reports occasional pain during the night, which is relieved by bringing knee to chest  PT recommended use of pillows between the legs to avoid knee to knee contact and provide relief  Pt has a sedentary job from home that involves sitting for prolonged periods of time and reports stiffness and inability to bend the knee after prolonged sitting  Pt is very active and continues to walk and participate in outdoor activities such as hiking, though experiences increased in symptoms the next day post activity             Recurrent probem    Quality of life: good    Pain  Current pain ratin  At best pain ratin  At worst pain ratin  Location: L knee  Quality: dull ache and tight  Relieving factors: rest, relaxation, ice and heat  Aggravating factors: walking and stair climbing  Progression: no change    Social Support  Steps to enter house: yes  Stairs in house: yes Lives in: multiple-level home    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, 5 days a week)  Hand dominance: right  Exercise history: Maintains a very active lifestyle, enjoying outdoor acitivties such as walking and hiking  Treatments  Previous treatment: physical therapy (L mensical tear)  Patient Goals  Patient goals for therapy: increased strength, decreased pain, independence with ADLs/IADLs, return to sport/leisure activities and decreased edema (reduce edema from cyst)          Objective     Active Range of Motion   Left Hip   Normal active range of motion    Right Hip   Normal active range of motion  Left Knee   Flexion: 130 degrees with pain  Extension: 0 degrees with pain    Right Knee   Normal active range of motion  Left Ankle/Foot   Normal active range of motion    Right Ankle/Foot   Normal active range of motion    Strength/Myotome Testing     Left Hip   Planes of Motion   Flexion: 4-  Abduction: 4+  Adduction: 4+    Right Hip   Planes of Motion   Flexion: 4-  Abduction: 4+  Adduction: 4+    Left Knee   Flexion: 4+  Extension: 4    Right Knee   Flexion: 4+  Extension: 4    Left Ankle/Foot   Dorsiflexion: 4+  Plantar flexion: 4+    Right Ankle/Foot   Dorsiflexion: 4+  Plantar flexion: 4+    Tests     Left Knee   Negative lateral Annette and medial Annette                Precautions: none    HEP given, reassess if patient returns to physical therapy    Manuals                                                                 Neuro Re-Ed                                                                                                        Ther Ex                                                                                                                     Ther Activity                                       Gait Training                                       Modalities

## 2023-03-21 ENCOUNTER — HOSPITAL ENCOUNTER (OUTPATIENT)
Dept: MRI IMAGING | Facility: HOSPITAL | Age: 58
Discharge: HOME | End: 2023-03-21

## 2023-03-24 ENCOUNTER — PATIENT MESSAGE (OUTPATIENT)
Dept: INTERNAL MEDICINE CLINIC | Facility: CLINIC | Age: 58
End: 2023-03-24

## 2023-03-24 DIAGNOSIS — J45.20 MILD INTERMITTENT ASTHMA, UNSPECIFIED WHETHER COMPLICATED: Primary | ICD-10-CM

## 2023-03-24 RX ORDER — FLUTICASONE PROPIONATE AND SALMETEROL 250; 50 UG/1; UG/1
1 POWDER RESPIRATORY (INHALATION) 2 TIMES DAILY
Qty: 180 BLISTER | Refills: 1 | Status: SHIPPED | OUTPATIENT
Start: 2023-03-24 | End: 2023-09-20

## 2023-04-06 ENCOUNTER — HOSPITAL ENCOUNTER (OUTPATIENT)
Dept: MRI IMAGING | Facility: HOSPITAL | Age: 58
Discharge: HOME | End: 2023-04-06

## 2023-04-06 DIAGNOSIS — K86.2 PANCREATIC CYST: ICD-10-CM

## 2023-04-06 RX ADMIN — GADOBUTROL 7 ML: 604.72 INJECTION INTRAVENOUS at 09:16

## 2023-04-06 NOTE — NURSING NOTE
Device programmed remotely for MRI per [employer] [facility]  Patient continuously monitored by Radiology RN  Patient tolerated well  Device reprogrammed to prior settings per Joe  Vital signs stable throughout  No complaints per patient

## 2023-04-15 PROBLEM — K86.2 PANCREATIC CYST: Status: ACTIVE | Noted: 2018-07-12

## 2023-06-12 DIAGNOSIS — J45.20 MILD INTERMITTENT ASTHMA, UNSPECIFIED WHETHER COMPLICATED: Primary | ICD-10-CM

## 2023-06-12 RX ORDER — MONTELUKAST SODIUM 10 MG/1
10 TABLET ORAL
Qty: 90 TABLET | Refills: 1 | Status: SHIPPED | OUTPATIENT
Start: 2023-06-12

## 2023-09-03 DIAGNOSIS — E03.9 ACQUIRED HYPOTHYROIDISM: ICD-10-CM

## 2023-09-03 RX ORDER — LEVOTHYROXINE SODIUM 0.07 MG/1
TABLET ORAL
Qty: 90 TABLET | Refills: 0 | Status: SHIPPED | OUTPATIENT
Start: 2023-09-03

## 2023-09-11 ENCOUNTER — TELEPHONE (OUTPATIENT)
Age: 58
End: 2023-09-11

## 2023-09-11 NOTE — TELEPHONE ENCOUNTER
Good morning. This is [employer] calling. My YOB: 1965. I'm a patient of Doctor Paul Hickey and it's just time for my annual physical. And so I was calling to schedule that appointment if you want to give me a call back at [phone]. Thank you so much.  Mj.

## 2023-09-23 DIAGNOSIS — E03.9 ACQUIRED HYPOTHYROIDISM: ICD-10-CM

## 2023-09-23 DIAGNOSIS — J45.20 MILD INTERMITTENT ASTHMA, UNSPECIFIED WHETHER COMPLICATED: ICD-10-CM

## 2023-09-25 RX ORDER — MONTELUKAST SODIUM 10 MG/1
10 TABLET ORAL
Qty: 90 TABLET | Refills: 0 | Status: SHIPPED | OUTPATIENT
Start: 2023-09-25

## 2023-09-25 RX ORDER — LEVOTHYROXINE SODIUM 0.07 MG/1
75 TABLET ORAL DAILY
Qty: 90 TABLET | Refills: 0 | Status: SHIPPED | OUTPATIENT
Start: 2023-09-25

## 2023-10-03 ENCOUNTER — TELEPHONE (OUTPATIENT)
Dept: ADMINISTRATIVE | Facility: OTHER | Age: 58
End: 2023-10-03

## 2023-10-03 NOTE — TELEPHONE ENCOUNTER
----- Message from Karin Ho sent at 10/3/2023 11:10 AM EDT -----  Regarding: [address] COLONOSCOPY  10/03/23 11:10 AM    Hello, our patient Landy Burris has had CRC: Colonoscopy completed/performed. Please assist in updating the patient chart by pulling the Care Everywhere (CE) document. The date of service is 10/21/22 completed by [facility] 3-5 year recall.      Thank you,  Karin Ho  [facility] [address] [place]

## 2023-10-05 NOTE — TELEPHONE ENCOUNTER
Upon review of the In Basket request we were able to locate, review, and update the patient chart as requested for CRC: Colonoscopy. Any additional questions or concerns should be emailed to the Practice Liaisons via the appropriate education email address, please do not reply via In Basket.     Thank you  Joseph Lees

## 2023-10-10 DIAGNOSIS — J45.20 MILD INTERMITTENT ASTHMA, UNSPECIFIED WHETHER COMPLICATED: ICD-10-CM

## 2023-10-10 RX ORDER — FLUTICASONE PROPIONATE AND SALMETEROL 50; 250 UG/1; UG/1
1 POWDER RESPIRATORY (INHALATION) 2 TIMES DAILY
Qty: 60 BLISTER | Refills: 5 | Status: SHIPPED | OUTPATIENT
Start: 2023-10-10

## 2023-10-16 DIAGNOSIS — I51.89 MILD LEFT VENTRICULAR SYSTOLIC DYSFUNCTION: Primary | ICD-10-CM

## 2023-10-16 RX ORDER — LISINOPRIL 2.5 MG/1
2.5 TABLET ORAL 2 TIMES DAILY
Qty: 90 TABLET | Refills: 1 | Status: SHIPPED | OUTPATIENT
Start: 2023-10-16

## 2023-10-18 DIAGNOSIS — I51.89 MILD LEFT VENTRICULAR SYSTOLIC DYSFUNCTION: ICD-10-CM

## 2023-10-20 ENCOUNTER — TELEPHONE (OUTPATIENT)
Age: 58
End: 2023-10-20

## 2023-10-20 NOTE — TELEPHONE ENCOUNTER
----- Message from Symone Avendano sent at 10/19/2023 10:19 AM EDT -----  Regarding: [facility] need to contact you  Contact: [phone]   I received an email from Kori with the following message regarding the Lisinopril refill:    Waiting for [phone]. Review    Before we can ship your medicine, we need to talk with your doctor's office as soon as possible about some required details. We have reached out multiple times but haven't heard back yet. You might want to contact their office to speed this process up so your order is not delayed any further. Can someone please get back to them or let me know if there is an issue?     Thanks,  Toño Gordon

## 2023-12-05 DIAGNOSIS — E03.9 ACQUIRED HYPOTHYROIDISM: ICD-10-CM

## 2023-12-05 DIAGNOSIS — J45.20 MILD INTERMITTENT ASTHMA, UNSPECIFIED WHETHER COMPLICATED: ICD-10-CM

## 2023-12-05 RX ORDER — MONTELUKAST SODIUM 10 MG/1
10 TABLET ORAL
Qty: 90 TABLET | Refills: 0 | Status: CANCELLED | OUTPATIENT
Start: 2023-12-05

## 2023-12-05 RX ORDER — LEVOTHYROXINE SODIUM 0.07 MG/1
75 TABLET ORAL DAILY
Qty: 90 TABLET | Refills: 0 | Status: CANCELLED | OUTPATIENT
Start: 2023-12-05

## 2023-12-06 DIAGNOSIS — E03.9 ACQUIRED HYPOTHYROIDISM: ICD-10-CM

## 2023-12-06 DIAGNOSIS — J45.20 MILD INTERMITTENT ASTHMA, UNSPECIFIED WHETHER COMPLICATED: ICD-10-CM

## 2023-12-06 RX ORDER — LEVOTHYROXINE SODIUM 0.07 MG/1
75 TABLET ORAL DAILY
Qty: 90 TABLET | Refills: 3 | Status: SHIPPED | OUTPATIENT
Start: 2023-12-06

## 2023-12-06 RX ORDER — MONTELUKAST SODIUM 10 MG/1
10 TABLET ORAL
Qty: 90 TABLET | Refills: 3 | Status: SHIPPED | OUTPATIENT
Start: 2023-12-06

## 2023-12-29 ENCOUNTER — HOSPITAL ENCOUNTER (OUTPATIENT)
Dept: ULTRASOUND IMAGING | Facility: HOSPITAL | Age: 58
End: 2023-12-29
Payer: COMMERCIAL

## 2023-12-29 ENCOUNTER — OFFICE VISIT (OUTPATIENT)
Age: 58
End: 2023-12-29
Payer: COMMERCIAL

## 2023-12-29 ENCOUNTER — TELEPHONE (OUTPATIENT)
Age: 58
End: 2023-12-29

## 2023-12-29 VITALS
BODY MASS INDEX: 24.75 KG/M2 | RESPIRATION RATE: 17 BRPM | OXYGEN SATURATION: 97 % | HEIGHT: 66 IN | SYSTOLIC BLOOD PRESSURE: 115 MMHG | WEIGHT: 154 LBS | HEART RATE: 86 BPM | DIASTOLIC BLOOD PRESSURE: 60 MMHG

## 2023-12-29 DIAGNOSIS — R10.11 RIGHT UPPER QUADRANT ABDOMINAL PAIN: ICD-10-CM

## 2023-12-29 DIAGNOSIS — R10.11 RIGHT UPPER QUADRANT ABDOMINAL PAIN: Primary | ICD-10-CM

## 2023-12-29 PROCEDURE — 76705 ECHO EXAM OF ABDOMEN: CPT

## 2023-12-29 PROCEDURE — 99213 OFFICE O/P EST LOW 20 MIN: CPT

## 2023-12-29 NOTE — PROGRESS NOTES
INTERNAL MEDICINE FOLLOW-UP VISIT  [facility] - [facility] INTERNAL MEDICINE [address]    NAME: Poly Youssef  AGE: 58 y.o. SEX: female  : 1965     DATE: 2023     Assessment and Plan:   1. Right upper quadrant abdominal pain  Tenderness upon palpation to right upper quadrant as well as mid epigastric region.  No fever no vomiting.  But does have intermittent nausea.  Will obtain stat right upper quadrant ultrasound as well as some labs.  Patient instructed to go to the emergency room if symptoms worsen through the weekend.  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  Low suspicion that this is the cause of her issues at this time    - CBC and differential; Future  - US right upper quadrant; Future  - Lipase; Future  - Hepatic function panel; Future  - Basic metabolic panel; Future        No follow-ups on file.       Chief Complaint:     Chief Complaint   Patient presents with   • Bloated     Right upper Quadrant discomfort feeling      History of Present Illness:     Patient has had pain to right upper quad and mid epigastric region making her feel full with eating small amounts.  This is worsened over the last couple days.  She does admit to worsening with a large meal.  She comes in today for further assessment.  Denies any shortness of breath, no chest pain.  No fever    The following portions of the patient's history were reviewed and updated as appropriate: allergies, current medications, past family history, past medical history, past social history, past surgical history and problem list.     Review of Systems:     Review of Systems   Constitutional:  Negative for appetite change, chills, diaphoresis, fatigue, fever and unexpected weight change.   HENT:  Negative for postnasal drip and sneezing.    Eyes:   Negative for visual disturbance.   Respiratory:  Negative for chest tightness and shortness of breath.    Cardiovascular:  Negative for chest pain, palpitations and leg swelling.   Gastrointestinal:  Positive for abdominal pain. Negative for blood in stool.   Endocrine: Negative for cold intolerance, heat intolerance, polydipsia, polyphagia and polyuria.   Genitourinary:  Negative for difficulty urinating, dysuria, frequency and urgency.   Musculoskeletal:  Negative for arthralgias and myalgias.   Skin:  Negative for rash and wound.   Neurological:  Negative for dizziness, weakness, light-headedness and headaches.   Hematological:  Negative for adenopathy.   Psychiatric/Behavioral:  Negative for confusion, dysphoric mood and sleep disturbance. The patient is not nervous/anxious.         Past Medical History:     Past Medical History:   Diagnosis Date   • Allergic    • Asthma    • Cancer (HCC)    • Cardiomyopathy (HCC)    • Coronary artery disease    • Disease of thyroid gland    • Pacemaker    • Shingles         Current Medications:     Current Outpatient Medications:   •  Advair Diskus 250-50 MCG/ACT inhaler, INHALE 1 PUFF TWICE A DAY, Disp: 60 blister, Rfl: 5  •  albuterol (PROVENTIL HFA,VENTOLIN HFA) 90 mcg/act inhaler, Inhale 2 puffs every 6 (six) hours as needed, Disp: , Rfl:   •  carvedilol (COREG) 12.5 mg tablet, Take 12.5 mg by mouth 2 (two) times a day, Disp: , Rfl:   •  fexofenadine-pseudoephedrine (ALLEGRA-D)  MG per tablet, Take 1 tablet by mouth 2 (two) times a day, Disp: , Rfl:   •  levothyroxine 75 mcg tablet, TAKE 1 TABLET DAILY, Disp: 90 tablet, Rfl: 3  •  lisinopril (ZESTRIL) 2.5 mg tablet, Take 1 tablet (2.5 mg total) by mouth 2 (two) times a day, Disp: 90 tablet, Rfl: 1  •  montelukast (SINGULAIR) 10 mg tablet, TAKE 1 TABLET DAILY AT BEDTIME, Disp: 90 tablet, Rfl: 3  •  Multiple Vitamin (MULTI-DAY PO), Take 1 tablet by mouth in the morning, Disp: , Rfl:      Allergies:     Allergies  "  Allergen Reactions   • Eggs Or Egg-Derived Products - Food Allergy Vomiting     Violent vomiting   • Levofloxacin Other (See Comments)     Redness and burning at IV site   • Medical Tape Blisters   • Shellfish Allergy - Food Allergy Angioedema   • Avocado - Food Allergy Rash   • Tomato - Food Allergy Rash        Physical Exam:     /60 (BP Location: Left arm, Patient Position: Sitting, Cuff Size: Adult)   Pulse 86   Resp 17   Ht 5' 6\" (1.676 m)   Wt 69.9 kg (154 lb)   SpO2 97%   BMI 24.86 kg/m²     Physical Exam  Constitutional:       Appearance: She is well-developed.   HENT:      Head: Normocephalic and atraumatic.   Eyes:      Pupils: Pupils are equal, round, and reactive to light.   Neck:      Thyroid: No thyromegaly.   Cardiovascular:      Rate and Rhythm: Normal rate and regular rhythm.      Heart sounds: No murmur heard.  Pulmonary:      Effort: Pulmonary effort is normal.      Breath sounds: Normal breath sounds.   Abdominal:      General: Bowel sounds are normal.      Palpations: Abdomen is soft.      Tenderness: There is abdominal tenderness.   Musculoskeletal:         General: Normal range of motion.      Cervical back: Normal range of motion and neck supple.   Lymphadenopathy:      Cervical: No cervical adenopathy.   Skin:     General: Skin is warm and dry.   Neurological:      Mental Status: She is alert and oriented to person, place, and time.           Data:     Laboratory Results: I have personally reviewed the pertinent laboratory results/reports   Radiology/Other Diagnostic Testing Results: I have personally reviewed pertinent reports.      MARIANA Painter  [facility] INTERNAL MEDICINE [address]  "

## 2023-12-29 NOTE — TELEPHONE ENCOUNTER
Patient LVM : put her on the schedule today for 4:40pm--do you think she should go to ED?    Good morning. This is Kalie Domonique calling. My YOB: 1965. I am a patient of Doctor Schumacher and I was hoping to get in to see the doctor or his PA today. There's something either my right lung, there's something going on or something behind that I don't know what organs are behind your right lung, but I just feel pressure, not pain. And it's not. Doesn't feel like a heart attack or anything like that. Might be a gallbladder thing or a maybe, I don't know, RSV. I don't know what that feels like either. But if you could call and get me in to see someone today, I would greatly appreciate it. My telephone number is [phone]. Again, it's Kalie Domonique [birth year] my date of birth. That was my date of birth and my phone number is [phone]. Thank you.

## 2023-12-30 ENCOUNTER — APPOINTMENT (OUTPATIENT)
Dept: LAB | Facility: HOSPITAL | Age: 58
End: 2023-12-30
Payer: COMMERCIAL

## 2023-12-30 DIAGNOSIS — R10.11 RIGHT UPPER QUADRANT ABDOMINAL PAIN: ICD-10-CM

## 2023-12-30 LAB
ALBUMIN SERPL BCP-MCNC: 3.9 G/DL (ref 3.5–5)
ALP SERPL-CCNC: 81 U/L (ref 34–104)
ALT SERPL W P-5'-P-CCNC: 98 U/L (ref 7–52)
ANION GAP SERPL CALCULATED.3IONS-SCNC: 2 MMOL/L
AST SERPL W P-5'-P-CCNC: 28 U/L (ref 13–39)
BASOPHILS # BLD AUTO: 0.05 THOUSANDS/ÂΜL (ref 0–0.1)
BASOPHILS NFR BLD AUTO: 1 % (ref 0–1)
BILIRUB DIRECT SERPL-MCNC: 0.09 MG/DL (ref 0–0.2)
BILIRUB SERPL-MCNC: 0.64 MG/DL (ref 0.2–1)
BUN SERPL-MCNC: 7 MG/DL (ref 5–25)
CALCIUM SERPL-MCNC: 9.5 MG/DL (ref 8.4–10.2)
CHLORIDE SERPL-SCNC: 106 MMOL/L (ref 96–108)
CO2 SERPL-SCNC: 31 MMOL/L (ref 21–32)
CREAT SERPL-MCNC: 0.81 MG/DL (ref 0.6–1.3)
EOSINOPHIL # BLD AUTO: 0.13 THOUSAND/ÂΜL (ref 0–0.61)
EOSINOPHIL NFR BLD AUTO: 2 % (ref 0–6)
ERYTHROCYTE [DISTWIDTH] IN BLOOD BY AUTOMATED COUNT: 13.6 % (ref 11.6–15.1)
GFR SERPL CREATININE-BSD FRML MDRD: 80 ML/MIN/1.73SQ M
GLUCOSE P FAST SERPL-MCNC: 97 MG/DL (ref 65–99)
HCT VFR BLD AUTO: 38.1 % (ref 34.8–46.1)
HGB BLD-MCNC: 12.5 G/DL (ref 11.5–15.4)
IMM GRANULOCYTES # BLD AUTO: 0.01 THOUSAND/UL (ref 0–0.2)
IMM GRANULOCYTES NFR BLD AUTO: 0 % (ref 0–2)
LIPASE SERPL-CCNC: <6 U/L (ref 11–82)
LYMPHOCYTES # BLD AUTO: 1.85 THOUSANDS/ÂΜL (ref 0.6–4.47)
LYMPHOCYTES NFR BLD AUTO: 25 % (ref 14–44)
MCH RBC QN AUTO: 31.9 PG (ref 26.8–34.3)
MCHC RBC AUTO-ENTMCNC: 32.8 G/DL (ref 31.4–37.4)
MCV RBC AUTO: 97 FL (ref 82–98)
MONOCYTES # BLD AUTO: 0.75 THOUSAND/ÂΜL (ref 0.17–1.22)
MONOCYTES NFR BLD AUTO: 10 % (ref 4–12)
NEUTROPHILS # BLD AUTO: 4.63 THOUSANDS/ÂΜL (ref 1.85–7.62)
NEUTS SEG NFR BLD AUTO: 62 % (ref 43–75)
NRBC BLD AUTO-RTO: 0 /100 WBCS
PLATELET # BLD AUTO: 357 THOUSANDS/UL (ref 149–390)
PMV BLD AUTO: 10.4 FL (ref 8.9–12.7)
POTASSIUM SERPL-SCNC: 4.1 MMOL/L (ref 3.5–5.3)
PROT SERPL-MCNC: 7.1 G/DL (ref 6.4–8.4)
RBC # BLD AUTO: 3.92 MILLION/UL (ref 3.81–5.12)
SODIUM SERPL-SCNC: 139 MMOL/L (ref 135–147)
WBC # BLD AUTO: 7.42 THOUSAND/UL (ref 4.31–10.16)

## 2023-12-30 PROCEDURE — 80048 BASIC METABOLIC PNL TOTAL CA: CPT

## 2023-12-30 PROCEDURE — 36415 COLL VENOUS BLD VENIPUNCTURE: CPT

## 2023-12-30 PROCEDURE — 85025 COMPLETE CBC W/AUTO DIFF WBC: CPT

## 2023-12-30 PROCEDURE — 80076 HEPATIC FUNCTION PANEL: CPT

## 2023-12-30 PROCEDURE — 83690 ASSAY OF LIPASE: CPT

## 2023-12-30 NOTE — PROGRESS NOTES
I reviewed labs and ultrasound with patient.  She is feeling about the same but just woke up still has not assessed.  I explained that if symptoms worsen over the weekend she is to go to the emergency department or call the office for advice.  If symptoms fail to improve by Tuesday she is to repeat the hepatic panel.  If symptoms resolve then she will wait a week to repeat the hepatic panel to make sure the ALT has normalized.  I explained that this could be a nonspecific reaction to a viral syndrome, unlikely to be related to medication as there are no new exposures.  MRI from last April reviewed and liver echogenicity was normal.  She will be scheduling repeat MRI to assess pancreatic lesion in April.

## 2024-01-02 ENCOUNTER — APPOINTMENT (OUTPATIENT)
Dept: LAB | Facility: HOSPITAL | Age: 59
End: 2024-01-02
Payer: COMMERCIAL

## 2024-01-02 ENCOUNTER — TELEPHONE (OUTPATIENT)
Age: 59
End: 2024-01-02

## 2024-01-02 DIAGNOSIS — R74.01 ELEVATED ALT MEASUREMENT: ICD-10-CM

## 2024-01-02 DIAGNOSIS — R74.01 ELEVATED ALT MEASUREMENT: Primary | ICD-10-CM

## 2024-01-02 LAB
ALBUMIN SERPL BCP-MCNC: 4.3 G/DL (ref 3.5–5)
ALP SERPL-CCNC: 86 U/L (ref 34–104)
ALT SERPL W P-5'-P-CCNC: 50 U/L (ref 7–52)
AST SERPL W P-5'-P-CCNC: 22 U/L (ref 13–39)
BILIRUB DIRECT SERPL-MCNC: 0.1 MG/DL (ref 0–0.2)
BILIRUB SERPL-MCNC: 0.66 MG/DL (ref 0.2–1)
PROT SERPL-MCNC: 7.6 G/DL (ref 6.4–8.4)

## 2024-01-02 PROCEDURE — 80076 HEPATIC FUNCTION PANEL: CPT

## 2024-01-02 PROCEDURE — 36415 COLL VENOUS BLD VENIPUNCTURE: CPT

## 2024-01-02 NOTE — TELEPHONE ENCOUNTER
Patient called and said that as per Dr. Arriola and Kellie, that if she is not feeling better that they would order more labs to be done, and she cannot find labs in her MyChart so she can get done.

## 2024-01-05 ENCOUNTER — HOSPITAL ENCOUNTER (OUTPATIENT)
Dept: RADIOLOGY | Facility: HOSPITAL | Age: 59
Discharge: HOME | End: 2024-01-05

## 2024-01-05 DIAGNOSIS — Z95.0 PACEMAKER: ICD-10-CM

## 2024-01-16 DIAGNOSIS — Z00.6 ENCOUNTER FOR EXAMINATION FOR NORMAL COMPARISON OR CONTROL IN CLINICAL RESEARCH PROGRAM: ICD-10-CM

## 2024-01-24 ENCOUNTER — APPOINTMENT (OUTPATIENT)
Dept: LAB | Facility: HOSPITAL | Age: 59
End: 2024-01-24
Payer: COMMERCIAL

## 2024-01-24 ENCOUNTER — HOSPITAL ENCOUNTER (OUTPATIENT)
Dept: MRI IMAGING | Facility: HOSPITAL | Age: 59
Discharge: HOME | End: 2024-01-24
Payer: COMMERCIAL

## 2024-01-24 DIAGNOSIS — Z00.6 ENCOUNTER FOR EXAMINATION FOR NORMAL COMPARISON OR CONTROL IN CLINICAL RESEARCH PROGRAM: ICD-10-CM

## 2024-01-24 DIAGNOSIS — K86.89 PANCREATIC MASS: ICD-10-CM

## 2024-01-24 PROCEDURE — A9585 GADOBUTROL INJECTION: HCPCS | Performed by: INTERNAL MEDICINE

## 2024-01-24 PROCEDURE — G1004 CDSM NDSC: HCPCS

## 2024-01-24 PROCEDURE — 74183 MRI ABD W/O CNTR FLWD CNTR: CPT

## 2024-01-24 PROCEDURE — 36415 COLL VENOUS BLD VENIPUNCTURE: CPT

## 2024-01-24 RX ORDER — GADOBUTROL 604.72 MG/ML
6 INJECTION INTRAVENOUS
Status: COMPLETED | OUTPATIENT
Start: 2024-01-24 | End: 2024-01-24

## 2024-01-24 RX ADMIN — GADOBUTROL 6 ML: 604.72 INJECTION INTRAVENOUS at 08:56

## 2024-02-15 ENCOUNTER — OFFICE VISIT (OUTPATIENT)
Age: 59
End: 2024-02-15
Payer: COMMERCIAL

## 2024-02-15 VITALS
HEIGHT: 66 IN | OXYGEN SATURATION: 98 % | TEMPERATURE: 98.6 F | DIASTOLIC BLOOD PRESSURE: 62 MMHG | BODY MASS INDEX: 24.62 KG/M2 | HEART RATE: 82 BPM | WEIGHT: 153.2 LBS | SYSTOLIC BLOOD PRESSURE: 98 MMHG

## 2024-02-15 DIAGNOSIS — M25.519 ACUTE SHOULDER PAIN, UNSPECIFIED LATERALITY: ICD-10-CM

## 2024-02-15 DIAGNOSIS — J06.9 UPPER RESPIRATORY TRACT INFECTION, UNSPECIFIED TYPE: Primary | ICD-10-CM

## 2024-02-15 PROCEDURE — 99214 OFFICE O/P EST MOD 30 MIN: CPT

## 2024-02-15 RX ORDER — AZITHROMYCIN 250 MG/1
TABLET, FILM COATED ORAL DAILY
Qty: 6 TABLET | Refills: 0 | Status: SHIPPED | OUTPATIENT
Start: 2024-02-15 | End: 2024-02-20

## 2024-02-15 RX ORDER — METHYLPREDNISOLONE 4 MG/1
TABLET ORAL
Qty: 21 EACH | Refills: 0 | Status: SHIPPED | OUTPATIENT
Start: 2024-02-15

## 2024-02-15 NOTE — PROGRESS NOTES
INTERNAL MEDICINE FOLLOW-UP VISIT  [facility] - [facility] INTERNAL MEDICINE [address]    NAME: Poly Youssef  AGE: 58 y.o. SEX: female  : 1965     DATE: 2/15/2024     Assessment and Plan:   1. Upper respiratory tract infection, unspecified type  Sinus pressure sinus pain, congestion and ear pain, dizziness for approximately 2 weeks.  No improvement despite use of OTC antihistamine as well as Flonase.  Occasional use of [place] as well with no improvement.  Will start Z-Eric.  Follow-up in the office if no improvement    2. Acute shoulder pain, unspecified laterality  Right shoulder LDL, pain with movement.  Several years ago she injured her shoulder which has been stable until the last 2 months.  She is unable to laterally abduct her shoulder she has poor range of motion. Positive drop arm test.  Will start with physical therapy did discuss that if she has no benefit from this or her pain worsens she is to contact the office and we will consider diagnostics        Return in about 4 weeks (around 3/14/2024) for Annual physical.       Chief Complaint:     Chief Complaint   Patient presents with   • Sinus Problem   • Shoulder Pain     (R)        History of Present Illness:     Poly is a 58-year-old female who presents to the office today with 2 complaints.  1 is involving her sinus and upper respiratory tract infection as well as right shoulder pain.    The following portions of the patient's history were reviewed and updated as appropriate: allergies, current medications, past family history, past medical history, past social history, past surgical history and problem list.     Review of Systems:     Review of Systems     Past Medical History:     Past Medical History:   Diagnosis Date   • Allergic    • Asthma    • Cancer (HCC)    • Cardiomyopathy (HCC)    • Coronary artery disease    • Disease of thyroid gland    • Pacemaker    • Shingles         Current Medications:  "    Current Outpatient Medications:   •  Advair Diskus 250-50 MCG/ACT inhaler, INHALE 1 PUFF TWICE A DAY, Disp: 60 blister, Rfl: 5  •  albuterol (PROVENTIL HFA,VENTOLIN HFA) 90 mcg/act inhaler, Inhale 2 puffs every 6 (six) hours as needed, Disp: , Rfl:   •  azithromycin (Zithromax) 250 mg tablet, Take 2 tablets (500 mg total) by mouth daily for 1 day, THEN 1 tablet (250 mg total) daily for 4 days., Disp: 6 tablet, Rfl: 0  •  carvedilol (COREG) 12.5 mg tablet, Take 12.5 mg by mouth 2 (two) times a day, Disp: , Rfl:   •  fexofenadine-pseudoephedrine (ALLEGRA-D)  MG per tablet, Take 1 tablet by mouth 2 (two) times a day, Disp: , Rfl:   •  levothyroxine 75 mcg tablet, TAKE 1 TABLET DAILY, Disp: 90 tablet, Rfl: 3  •  lisinopril (ZESTRIL) 2.5 mg tablet, Take 1 tablet (2.5 mg total) by mouth 2 (two) times a day, Disp: 90 tablet, Rfl: 1  •  methylPREDNISolone 4 MG tablet therapy pack, Use as directed on package, Disp: 21 each, Rfl: 0  •  montelukast (SINGULAIR) 10 mg tablet, TAKE 1 TABLET DAILY AT BEDTIME, Disp: 90 tablet, Rfl: 3  •  Multiple Vitamin (MULTI-DAY PO), Take 1 tablet by mouth in the morning, Disp: , Rfl:      Allergies:     Allergies   Allergen Reactions   • Eggs Or Egg-Derived Products - Food Allergy Vomiting     Violent vomiting   • Levofloxacin Other (See Comments)     Redness and burning at IV site   • Medical Tape Blisters   • Shellfish Allergy - Food Allergy Angioedema   • Avocado - Food Allergy Rash   • Tomato - Food Allergy Rash        Physical Exam:     BP 98/62 (BP Location: Left arm, Patient Position: Sitting, Cuff Size: Standard)   Pulse 82   Temp 98.6 °F (37 °C) (Temporal)   Ht 5' 6\" (1.676 m)   Wt 69.5 kg (153 lb 3.2 oz)   SpO2 98%   BMI 24.73 kg/m²     Physical Exam  Constitutional:       Appearance: She is well-developed.   HENT:      Head: Normocephalic and atraumatic.   Eyes:      Pupils: Pupils are equal, round, and reactive to light.   Neck:      Thyroid: No thyromegaly. "   Cardiovascular:      Rate and Rhythm: Normal rate and regular rhythm.      Heart sounds: No murmur heard.  Pulmonary:      Effort: Pulmonary effort is normal.      Breath sounds: Normal breath sounds.   Abdominal:      General: Bowel sounds are normal.      Palpations: Abdomen is soft.   Musculoskeletal:         General: Normal range of motion.      Cervical back: Normal range of motion and neck supple.   Lymphadenopathy:      Cervical: No cervical adenopathy.   Skin:     General: Skin is warm and dry.   Neurological:      Mental Status: She is alert and oriented to person, place, and time.         Data:     Laboratory Results: I have personally reviewed the pertinent laboratory results/reports   Radiology/Other Diagnostic Testing Results: I have personally reviewed pertinent reports.      MARIANA Painter  [facility] INTERNAL MEDICINE [address]

## 2024-02-18 LAB
APOB+LDLR+PCSK9 GENE MUT ANL BLD/T: NOT DETECTED
BRCA1+BRCA2 DEL+DUP + FULL MUT ANL BLD/T: NOT DETECTED
MLH1+MSH2+MSH6+PMS2 GN DEL+DUP+FUL M: NOT DETECTED

## 2024-02-21 ENCOUNTER — EVALUATION (OUTPATIENT)
Dept: PHYSICAL THERAPY | Facility: CLINIC | Age: 59
End: 2024-02-21
Payer: COMMERCIAL

## 2024-02-21 DIAGNOSIS — M25.511 ACUTE PAIN OF RIGHT SHOULDER: Primary | ICD-10-CM

## 2024-02-21 PROCEDURE — 97161 PT EVAL LOW COMPLEX 20 MIN: CPT | Performed by: PHYSICAL THERAPIST

## 2024-02-21 PROCEDURE — 97110 THERAPEUTIC EXERCISES: CPT | Performed by: PHYSICAL THERAPIST

## 2024-02-21 PROCEDURE — 97140 MANUAL THERAPY 1/> REGIONS: CPT | Performed by: PHYSICAL THERAPIST

## 2024-02-21 NOTE — PROGRESS NOTES
PT Evaluation     Today's date: 2024  Patient name: Poly Youssef  : 1965  MRN: [id number]  Referring provider: Kellie Stevens CRNP  Dx:   Encounter Diagnosis     ICD-10-CM    1. Acute pain of right shoulder  M25.511 Ambulatory Referral to Physical Therapy          Start Time: 0800  Stop Time: 0845  Total time in clinic (min): 45 minutes    Assessment  Assessment details: Pt is a 59 y/o presenting to physical therapy with R shoulder pain and restrictions in motion. Upon examination, pt presents with impairments of decreased strength, decreased ROM, and increased pain of pt's R shoulder resulting in limitations of self-care/ADLs, household activiities, reaching overhead, and lifting objects. Pt plan of care will focus on improving strength and ROM of pt's R shoulder as well as decreasing pain and improving tolerance to functional activities. Pt would benefit from skilled physical therapy to facilitate a return to her prior level of function.           Impairments: abnormal muscle tone, abnormal or restricted ROM, activity intolerance, impaired physical strength, lacks appropriate home exercise program and pain with function  Functional limitations: self-care/ADLs, household activiities, reaching overhead, and lifting objectsUnderstanding of Dx/Px/POC: good   Prognosis: good    Goals  ST weeks  1. Pt will have a subjective decrease in pain of pt's R shoulder by 2 levels or more during overhead reaching  2. Pt's FOTO score will improve by 10 points or better to facilitate a return to pt's prior level of function.      LT weeks  1. Pt will demonstrate 4+/5 gross strength or better of her R shoulder in all planes to facilitate a return to her prior level of function  2. Pt will demonstrate WNL AROM of her R shoulder in all planes to facilitate a return to her prior level of function    Plan  Patient would benefit from: PT eval and skilled physical therapy  Planned modality interventions:  cryotherapy, thermotherapy: hydrocollator packs and unattended electrical stimulation  Planned therapy interventions: IASTM, joint mobilization, kinesiology taping, manual therapy, nerve gliding, neuromuscular re-education, patient education, postural training, strengthening, stretching, therapeutic activities, therapeutic exercise, activity modification, balance/weight bearing training, flexibility, functional ROM exercises and home exercise program  Frequency: 2x week  Duration in weeks: 8  Plan of Care beginning date: 2/21/2024  Plan of Care expiration date: 4/17/2024  Treatment plan discussed with: patient        Subjective Evaluation    History of Present Illness  Mechanism of injury: Pt is a 59 y/o presenting to physical therapy with R shoulder pain and restrictions in motion. About a year ago, pt reports reaching for her comforter with her RUE when she felt a pop and immediate pain. Pt reports her pain and motion was getting better, however, she exacerbated a couple weeks when reaching up for the cupboard. Pt reports she will have increased pain with reaching overhead, out to the side, and behind her back as well as lifting objects. Pt reports she will have decreased pain with use of tylenol/anti-inflammatory and heat. Pt reports she is unable to lay on her R side and difficulty with driving. Pt reports also having strength deficits of her R side.     Patient Goals  Patient goals for therapy: increased motion, decreased pain, increased strength, independence with ADLs/IADLs and return to sport/leisure activities    Pain  Current pain rating: 3  At best pain rating: 3  At worst pain rating: 10  Location: R shoulder  Quality: throbbing  Relieving factors: rest, heat and medications  Aggravating factors: lifting and overhead activity  Progression: worsening    Treatments  Current treatment: physical therapy        Objective     Palpation     Right   Tenderness of the latissimus, middle deltoid and supraspinatus.  "    Tenderness     Right Shoulder  Tenderness in the lateral scapula and supraspinatus tendon.     Active Range of Motion     Right Shoulder   Flexion: 115 degrees with pain  Abduction: 95 degrees with pain  External rotation 45°: 55 degrees with pain  External rotation BTH: Active external rotation behind the head: unable to do.   Internal rotation 45°: 50 degrees with pain  Internal rotation BTB: sacrum     Scapular Mobility     Right Shoulder   Scapular mobility: fair    Joint Play     Right Shoulder  Hypomobile in the posterior capsule and inferior capsule.     Strength/Myotome Testing     Right Shoulder     Planes of Motion   Flexion: 2+   Extension: 3-   Abduction: 2+   External rotation at 0°: 3-   Internal rotation at 0°: 4     Isolated Muscles   Lower trapezius: 3-   Middle trapezius: 3-     Tests     Right Shoulder   Positive Hawkin's.              Precautions: Cardiac and cancer history, pacemaker    POC expires Unit limit Auth Expiration date PT/OT + Visit Limit?   4/17 N/A N/A 120, medical review after 20                 Visit/Unit Tracking  AUTH Status:  Date 2/21              20 Used 1               Remaining  19                FOTO      Manuals 2/21            PROM R shoulder JOE            Post/inf GH mobilizations JOE            GH distraction JOE            IR MWM JOE            Post mobilization c ER JOE            Neuro Re-Ed             Education on POC, diagnosis and HEP 5'                                                                                          Ther Ex             pendulums 2x10 2# CW/CCW            Self traction at treadmill 1x10 5\"            AAROM flexion 1x10 5\"            AAROM ER 1x10 5\"                                                                Ther Activity                                       Gait Training                                       Modalities                                            "

## 2024-02-26 ENCOUNTER — OFFICE VISIT (OUTPATIENT)
Dept: PHYSICAL THERAPY | Facility: CLINIC | Age: 59
End: 2024-02-26
Payer: COMMERCIAL

## 2024-02-26 DIAGNOSIS — M25.511 ACUTE PAIN OF RIGHT SHOULDER: Primary | ICD-10-CM

## 2024-02-26 PROCEDURE — 97140 MANUAL THERAPY 1/> REGIONS: CPT | Performed by: PHYSICAL THERAPIST

## 2024-02-26 PROCEDURE — 97112 NEUROMUSCULAR REEDUCATION: CPT | Performed by: PHYSICAL THERAPIST

## 2024-02-26 PROCEDURE — 97110 THERAPEUTIC EXERCISES: CPT | Performed by: PHYSICAL THERAPIST

## 2024-02-26 NOTE — PROGRESS NOTES
"Daily Note     Today's date: 2024  Patient name: Poly Youssef  : 1965  MRN: [id number]  Referring provider: Kellie Stevens CRNP  Dx:   Encounter Diagnosis     ICD-10-CM    1. Acute pain of right shoulder  M25.511           Start Time: 0845  Stop Time: 0930  Total time in clinic (min): 45 minutes    Subjective: Pt reports having improved motion of her R shoulder after her initial evaluation.       Objective: See treatment diary below      Assessment: Tolerated treatment well. Patient demonstrated fatigue post treatment, exhibited good technique with therapeutic exercises, and would benefit from continued PT. Pt had a significant improvement in shoulder abduction AROM after performance of SSMP humeral depression in the plane of abduction at 90 degrees. However, pt does continue to demonstrate elevation of her humerus during overhead motion. Pt required min verbal cues to facilitate performance of proper technique. Assess pt response to treatment at next visit.      Plan: Continue per plan of care.      Precautions: Cardiac and cancer history, pacemaker    POC expires Unit limit Auth Expiration date PT/OT + Visit Limit?    N/A N/A 120, medical review after 20                 Visit/Unit Tracking  AUTH Status:  Date              20 Used 1 2              Remaining  19 18               FOTO      Manuals            PROM R shoulder JOE JOE           Post/inf GH mobilizations JOE JOE           GH distraction JOE JOE           SSMP humeral depression  JOE           Post mobilization c ER JOE JOE           Neuro Re-Ed             Pt education 5' 4'           No monies  2x10 grn           Prone I  2x10           Prone T  1x10                                                  Ther Ex             pendulums 2x10 2# CW/CCW            Abduction wall slide  1x10 5\"           AAROM flexion 1x10 5\" 1x15 5\"           AAROM ER 1x10 5\" 1x15 5\"           Lat stretch c cane  1x10 10\"           Pullies " flex/abd  2x10                                     Ther Activity                                       Gait Training                                       Modalities

## 2024-03-04 ENCOUNTER — OFFICE VISIT (OUTPATIENT)
Dept: PHYSICAL THERAPY | Facility: CLINIC | Age: 59
End: 2024-03-04
Payer: COMMERCIAL

## 2024-03-04 DIAGNOSIS — Z00.00 WELL ADULT EXAM: Primary | ICD-10-CM

## 2024-03-04 DIAGNOSIS — M25.511 ACUTE PAIN OF RIGHT SHOULDER: Primary | ICD-10-CM

## 2024-03-04 PROCEDURE — 97110 THERAPEUTIC EXERCISES: CPT | Performed by: PHYSICAL THERAPIST

## 2024-03-04 PROCEDURE — 97112 NEUROMUSCULAR REEDUCATION: CPT | Performed by: PHYSICAL THERAPIST

## 2024-03-04 NOTE — PROGRESS NOTES
"Daily Note     Today's date: 3/4/2024  Patient name: Poly Youssef  : 1965  MRN: [id number]  Referring provider: Kellie Stevens CRNP  Dx:   Encounter Diagnosis     ICD-10-CM    1. Acute pain of right shoulder  M25.511           Start Time: 0759  Stop Time: 0845  Total time in clinic (min): 46 minutes    Subjective: Pt reports doing well after her last therapy session with improved motion of her R shoulder.       Objective: See treatment diary below      Assessment: Tolerated treatment well. Patient demonstrated fatigue post treatment, exhibited good technique with therapeutic exercises, and would benefit from continued PT. Pt demonstrated improved AROM flexion and abduction at today's session, however, she continues to have significant limitations with reaching behind her back. Pt required min verbal cues to facilitate performance of proper technique. Assess pt response to treatment at next visit.      Plan: Continue per plan of care.      Precautions: Cardiac and cancer history, pacemaker    POC expires Unit limit Auth Expiration date PT/OT + Visit Limit?    N/A N/A 120, medical review after 20                 Visit/Unit Tracking  AUTH Status:  Date 2/21 2/26 3/4            20 Used 1 2 3             Remaining  19 18 17              FOTO      Manuals 2/21 2/26 3/4          PROM R shoulder JOE JOE DIAZ          Post/inf GH mobilizations JOE JOE JOE          GH distraction JOE JOE JOE          SSMP humeral depression  JOE JOE          Post mobilization c ER JOE DIAZ JOE          Neuro Re-Ed             Pt education 5' 4' 4'          No monies  2x10 grn           Prone I  2x10 2x10          Prone T  1x10 2x10          Sidelying ER   2x10 2#          Standing extension c retraction   1x15 3\"                       Ther Ex             IR ecc c DB burnout   Max reps 4#          Sidelying abduction   2x10 2#          IR stretch c strap   1x10 10\"          AAROM ER 1x10 5\" 1x15 5\"           Lat stretch c cane  1x10 " "10\" 1x10 10\"          Pullies flex/abd  2x10 2' ea                                    Ther Activity                                       Gait Training                                       Modalities                                              "

## 2024-03-05 ENCOUNTER — APPOINTMENT (OUTPATIENT)
Dept: LAB | Facility: CLINIC | Age: 59
End: 2024-03-05
Payer: COMMERCIAL

## 2024-03-05 DIAGNOSIS — Z00.00 WELL ADULT EXAM: ICD-10-CM

## 2024-03-05 LAB
ALBUMIN SERPL BCP-MCNC: 4.3 G/DL (ref 3.5–5)
ALP SERPL-CCNC: 70 U/L (ref 34–104)
ALT SERPL W P-5'-P-CCNC: 31 U/L (ref 7–52)
ANION GAP SERPL CALCULATED.3IONS-SCNC: 6 MMOL/L
AST SERPL W P-5'-P-CCNC: 26 U/L (ref 13–39)
BASOPHILS # BLD AUTO: 0.08 THOUSANDS/ÂΜL (ref 0–0.1)
BASOPHILS NFR BLD AUTO: 1 % (ref 0–1)
BILIRUB SERPL-MCNC: 0.67 MG/DL (ref 0.2–1)
BUN SERPL-MCNC: 15 MG/DL (ref 5–25)
CALCIUM SERPL-MCNC: 9.5 MG/DL (ref 8.4–10.2)
CHLORIDE SERPL-SCNC: 105 MMOL/L (ref 96–108)
CHOLEST SERPL-MCNC: 208 MG/DL
CO2 SERPL-SCNC: 31 MMOL/L (ref 21–32)
CREAT SERPL-MCNC: 0.78 MG/DL (ref 0.6–1.3)
EOSINOPHIL # BLD AUTO: 0.15 THOUSAND/ÂΜL (ref 0–0.61)
EOSINOPHIL NFR BLD AUTO: 2 % (ref 0–6)
ERYTHROCYTE [DISTWIDTH] IN BLOOD BY AUTOMATED COUNT: 13.5 % (ref 11.6–15.1)
EST. AVERAGE GLUCOSE BLD GHB EST-MCNC: 128 MG/DL
GFR SERPL CREATININE-BSD FRML MDRD: 84 ML/MIN/1.73SQ M
GLUCOSE P FAST SERPL-MCNC: 80 MG/DL (ref 65–99)
HBA1C MFR BLD: 6.1 %
HCT VFR BLD AUTO: 42.3 % (ref 34.8–46.1)
HDLC SERPL-MCNC: 67 MG/DL
HGB BLD-MCNC: 13.2 G/DL (ref 11.5–15.4)
IMM GRANULOCYTES # BLD AUTO: 0.02 THOUSAND/UL (ref 0–0.2)
IMM GRANULOCYTES NFR BLD AUTO: 0 % (ref 0–2)
LDLC SERPL CALC-MCNC: 124 MG/DL (ref 0–100)
LYMPHOCYTES # BLD AUTO: 2.08 THOUSANDS/ÂΜL (ref 0.6–4.47)
LYMPHOCYTES NFR BLD AUTO: 28 % (ref 14–44)
MCH RBC QN AUTO: 31.2 PG (ref 26.8–34.3)
MCHC RBC AUTO-ENTMCNC: 31.2 G/DL (ref 31.4–37.4)
MCV RBC AUTO: 100 FL (ref 82–98)
MONOCYTES # BLD AUTO: 0.9 THOUSAND/ÂΜL (ref 0.17–1.22)
MONOCYTES NFR BLD AUTO: 12 % (ref 4–12)
NEUTROPHILS # BLD AUTO: 4.24 THOUSANDS/ÂΜL (ref 1.85–7.62)
NEUTS SEG NFR BLD AUTO: 57 % (ref 43–75)
NONHDLC SERPL-MCNC: 141 MG/DL
NRBC BLD AUTO-RTO: 0 /100 WBCS
PLATELET # BLD AUTO: 326 THOUSANDS/UL (ref 149–390)
PMV BLD AUTO: 11.2 FL (ref 8.9–12.7)
POTASSIUM SERPL-SCNC: 4.4 MMOL/L (ref 3.5–5.3)
PROT SERPL-MCNC: 7.3 G/DL (ref 6.4–8.4)
RBC # BLD AUTO: 4.23 MILLION/UL (ref 3.81–5.12)
SODIUM SERPL-SCNC: 142 MMOL/L (ref 135–147)
TRIGL SERPL-MCNC: 85 MG/DL
TSH SERPL DL<=0.05 MIU/L-ACNC: 3.07 UIU/ML (ref 0.45–4.5)
WBC # BLD AUTO: 7.47 THOUSAND/UL (ref 4.31–10.16)

## 2024-03-05 PROCEDURE — 83036 HEMOGLOBIN GLYCOSYLATED A1C: CPT

## 2024-03-05 PROCEDURE — 36415 COLL VENOUS BLD VENIPUNCTURE: CPT

## 2024-03-05 PROCEDURE — 84443 ASSAY THYROID STIM HORMONE: CPT

## 2024-03-05 PROCEDURE — 85025 COMPLETE CBC W/AUTO DIFF WBC: CPT

## 2024-03-05 PROCEDURE — 80061 LIPID PANEL: CPT

## 2024-03-05 PROCEDURE — 80053 COMPREHEN METABOLIC PANEL: CPT

## 2024-03-06 ENCOUNTER — OFFICE VISIT (OUTPATIENT)
Dept: PHYSICAL THERAPY | Facility: CLINIC | Age: 59
End: 2024-03-06
Payer: COMMERCIAL

## 2024-03-06 DIAGNOSIS — M25.511 ACUTE PAIN OF RIGHT SHOULDER: Primary | ICD-10-CM

## 2024-03-06 PROCEDURE — 97110 THERAPEUTIC EXERCISES: CPT | Performed by: PHYSICAL THERAPIST

## 2024-03-06 PROCEDURE — 97112 NEUROMUSCULAR REEDUCATION: CPT | Performed by: PHYSICAL THERAPIST

## 2024-03-06 PROCEDURE — 97140 MANUAL THERAPY 1/> REGIONS: CPT | Performed by: PHYSICAL THERAPIST

## 2024-03-06 NOTE — PROGRESS NOTES
"Daily Note     Today's date: 3/6/2024  Patient name: Poly Youssef  : 1965  MRN: [id number]  Referring provider: Kellie Stevens CRNP  Dx:   Encounter Diagnosis     ICD-10-CM    1. Acute pain of right shoulder  M25.511           Start Time: 0758  Stop Time: 0845  Total time in clinic (min): 47 minutes    Subjective: Pt reports she continues to have improvements in AROM of her R shoulder.       Objective: See treatment diary below      Assessment: Tolerated treatment well. Patient demonstrated fatigue post treatment, exhibited good technique with therapeutic exercises, and would benefit from continued PT. Pt was able to progress today with inclusion of candi rows and extensions into pt's exercise program to further facilitate middle and lower trap recruitment. Pt continues to demonstrate improved AROM of her R shoulder in all planes. Pt required min verbal cues to facilitate performance of proper technique. Assess pt response to treatment at next visit.      Plan: Continue per plan of care.      Precautions: Cardiac and cancer history, pacemaker    POC expires Unit limit Auth Expiration date PT/OT + Visit Limit?    N/A N/A 120, medical review after 20                 Visit/Unit Tracking  AUTH Status:  Date 2/21 2/26 3/4 3/6           20 Used 1 2 3 4            Remaining  19 18 17 16             FOTO      Manuals 2/21 2/26 3/4 3/6         PROM R shoulder JOE JOE JOE JOE         Post/inf GH mobilizations JOE JOE JOE JOE         MWM IR supine    JOE         SSMP humeral depression  JOE JOE JOE         Post mobilization c ER JOE JOE JOE JOE         Neuro Re-Ed             Pt education 5' 4' 4' 4'         Fenton rows    2x10 14#         Prone I  2x10 2x10 2x10 2#         Prone T  1x10 2x10 2x10          Sidelying ER   2x10 2# 2x10 2#         Standing extension c retraction   1x15 3\" 2x10 3\"         Candi extensoins    2x10 12#         Ther Ex             IR ecc c DB burnout   Max reps 4# Max reps 5#       " "  Sidelying abduction   2x10 2# 2x10 2#         IR stretch c strap   1x10 10\"          AAROM ER 1x10 5\" 1x15 5\"           Lat stretch c cane  1x10 10\" 1x10 10\" 1x10 10\"         Pullies flex/abd  2x10 2' ea 2' ea         UBE for UE strength    5'                      Ther Activity                                       Gait Training                                       Modalities                                                "

## 2024-03-07 ENCOUNTER — OFFICE VISIT (OUTPATIENT)
Age: 59
End: 2024-03-07
Payer: COMMERCIAL

## 2024-03-07 VITALS
OXYGEN SATURATION: 98 % | DIASTOLIC BLOOD PRESSURE: 64 MMHG | WEIGHT: 154 LBS | HEART RATE: 96 BPM | SYSTOLIC BLOOD PRESSURE: 89 MMHG | HEIGHT: 66 IN | BODY MASS INDEX: 24.75 KG/M2

## 2024-03-07 DIAGNOSIS — I34.0 NONRHEUMATIC MITRAL VALVE REGURGITATION: ICD-10-CM

## 2024-03-07 DIAGNOSIS — K86.2 PANCREATIC CYST: ICD-10-CM

## 2024-03-07 DIAGNOSIS — Z90.81 H/O SPLENECTOMY: ICD-10-CM

## 2024-03-07 DIAGNOSIS — E53.8 B12 DEFICIENCY: ICD-10-CM

## 2024-03-07 DIAGNOSIS — I44.2 COMPLETE HEART BLOCK (HCC): ICD-10-CM

## 2024-03-07 DIAGNOSIS — C81.91 HODGKIN LYMPHOMA OF LYMPH NODES OF NECK, UNSPECIFIED HODGKIN LYMPHOMA TYPE (HCC): ICD-10-CM

## 2024-03-07 DIAGNOSIS — H81.11 BENIGN PAROXYSMAL POSITIONAL VERTIGO OF RIGHT EAR: ICD-10-CM

## 2024-03-07 DIAGNOSIS — J45.20 MILD INTERMITTENT ASTHMA, UNSPECIFIED WHETHER COMPLICATED: ICD-10-CM

## 2024-03-07 DIAGNOSIS — Z00.00 WELL ADULT EXAM: Primary | ICD-10-CM

## 2024-03-07 DIAGNOSIS — E03.9 ACQUIRED HYPOTHYROIDISM: ICD-10-CM

## 2024-03-07 DIAGNOSIS — J30.9 ALLERGIC RHINITIS, UNSPECIFIED SEASONALITY, UNSPECIFIED TRIGGER: ICD-10-CM

## 2024-03-07 DIAGNOSIS — I27.82 CHRONIC PULMONARY EMBOLISM WITHOUT ACUTE COR PULMONALE, UNSPECIFIED PULMONARY EMBOLISM TYPE (HCC): ICD-10-CM

## 2024-03-07 DIAGNOSIS — C50.919 BREAST CANCER, STAGE 2, UNSPECIFIED LATERALITY (HCC): ICD-10-CM

## 2024-03-07 DIAGNOSIS — I50.9: ICD-10-CM

## 2024-03-07 DIAGNOSIS — I95.1 ORTHOSTATIC HYPOTENSION: ICD-10-CM

## 2024-03-07 PROCEDURE — 99396 PREV VISIT EST AGE 40-64: CPT | Performed by: INTERNAL MEDICINE

## 2024-03-07 NOTE — PROGRESS NOTES
Assessment/Plan:    Diagnoses and all orders for this visit:    Well adult exam    Pancreatic cyst    Acquired hypothyroidism    Mild intermittent asthma, unspecified whether complicated    Allergic rhinitis, unspecified seasonality, unspecified trigger    Chronic pulmonary embolism without acute cor pulmonale, unspecified pulmonary embolism type (HCC)    Nonrheumatic mitral valve regurgitation    Compensated cardiac failure (HCC)    Complete heart block (HCC)    Hodgkin lymphoma of lymph nodes of neck, unspecified Hodgkin lymphoma type (HCC)    H/O splenectomy    Breast cancer, stage 2, unspecified laterality (HCC)    B12 deficiency  -     Vitamin B12; Future  -     Folate; Future    Orthostatic hypotension    Benign paroxysmal positional vertigo of right ear    Other orders  -     Magnesium 100 MG TABS; Take by mouth              Patient Instructions   Labs reviewed in detail and compared to prior    Prediabetes-continue low-carb diet with exercise and maintenance of ideal body weight    Hyperlipidemia-borderline, continue with low saturated fat diet    Orthostatic hypotension-discussed with cardiology, I would advise either cut carvedilol in half or discontinue lisinopril.  Monitor symptoms and standing blood pressures.  Continue to keep well-hydrated.  Consider compression stockings    Persistent sinus congestion-consider [place] pot    BPPV-pathophysiology discussed, Epley maneuvers demonstrated in office, continue with Epley maneuvers and contact me if symptoms fail to improve    IPMN-continue surveillance with MRI in 2 years    Macrocytosis without anemia-check B12 and folic acid and follow accordingly    Otherwise medically stable.    Subjective:      Patient ID: Poly Youssef is a 58 y.o. female    Yearly well  Living in [place], plans to come closer in June, living w/ Cory.  Self employed w/ consulting.    Walks dog 1-2 mi daily ([place]), notes intermittent sx over past 6 mos w/ visual  disturbance f/b headache, and leg numb/weakness.  Sx occur together or independently.  Last ICD check was ok a few days ago.   Has h/o ocular migraines in past, but different and not lately.   Hodkins age '18 s/p XRT and splenectomy.    H/o R Breast CA age 31 s/p b/l mastectomy d/t mantal radiation, BRCA negative.  S/p saline, then silicone implants.   No recent onc f/y.    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  10 yrs left on battery  MV regurg f/b HUP  Ralf Monge and Naveen  PPM c/b PE x 2 after first and second unclear etiol, but first was a 5 hr procedure.  Tx'd Xarelto x 6 mos for both.  No asa.    L knee meniscal tear in 08 w/ exacerbation in 2021 w/ aggressive treadmill exercise.  Still w/ baker cyst and recurrent swelling.  Completed PT and continues w/ exercises.   Herniated disc f/b chiro about monthly and regular home exercises.  Asthma stable w/ wixela and singulair, no albuterol since fall.   Still dealing w/ rhinitis, no improvement w/ abx, draining better.  Taking allegra and flonase daily. Hasn't done netti pot.    Notes positional vertigo x 3 wks.  Worse lying back or looking up, lasts a few seconds.   Colonoscopy 10/22 w/ polyps removed  GYN-UTD          Current Outpatient Medications:     Advair Diskus 250-50 MCG/ACT inhaler, INHALE 1 PUFF TWICE A DAY, Disp: 60 blister, Rfl: 5    albuterol (PROVENTIL HFA,VENTOLIN HFA) 90 mcg/act inhaler, Inhale 2 puffs every 6 (six) hours as needed, Disp: , Rfl:     carvedilol (COREG) 12.5 mg tablet, Take 12.5 mg by mouth 2 (two) times a day, Disp: , Rfl:     levothyroxine 75 mcg tablet, TAKE 1 TABLET DAILY, Disp: 90 tablet, Rfl: 3    lisinopril (ZESTRIL) 2.5 mg tablet, Take 1 tablet (2.5 mg total) by mouth 2 (two) times a day, Disp: 90 tablet, Rfl: 1    Magnesium 100 MG TABS, Take by mouth, Disp: , Rfl:     montelukast (SINGULAIR) 10 mg tablet, TAKE 1 TABLET DAILY AT BEDTIME, Disp: 90 tablet, Rfl: 3    Multiple  Vitamin (MULTI-DAY PO), Take 1 tablet by mouth in the morning, Disp: , Rfl:     Recent Results (from the past 1008 hour(s))   CBC and differential    Collection Time: 03/05/24  7:05 AM   Result Value Ref Range    WBC 7.47 4.31 - 10.16 Thousand/uL    RBC 4.23 3.81 - 5.12 Million/uL    Hemoglobin 13.2 11.5 - 15.4 g/dL    Hematocrit 42.3 34.8 - 46.1 %     (H) 82 - 98 fL    MCH 31.2 26.8 - 34.3 pg    MCHC 31.2 (L) 31.4 - 37.4 g/dL    RDW 13.5 11.6 - 15.1 %    MPV 11.2 8.9 - 12.7 fL    Platelets 326 149 - 390 Thousands/uL    nRBC 0 /100 WBCs    Neutrophils Relative 57 43 - 75 %    Immat GRANS % 0 0 - 2 %    Lymphocytes Relative 28 14 - 44 %    Monocytes Relative 12 4 - 12 %    Eosinophils Relative 2 0 - 6 %    Basophils Relative 1 0 - 1 %    Neutrophils Absolute 4.24 1.85 - 7.62 Thousands/µL    Immature Grans Absolute 0.02 0.00 - 0.20 Thousand/uL    Lymphocytes Absolute 2.08 0.60 - 4.47 Thousands/µL    Monocytes Absolute 0.90 0.17 - 1.22 Thousand/µL    Eosinophils Absolute 0.15 0.00 - 0.61 Thousand/µL    Basophils Absolute 0.08 0.00 - 0.10 Thousands/µL   Comprehensive metabolic panel    Collection Time: 03/05/24  7:05 AM   Result Value Ref Range    Sodium 142 135 - 147 mmol/L    Potassium 4.4 3.5 - 5.3 mmol/L    Chloride 105 96 - 108 mmol/L    CO2 31 21 - 32 mmol/L    ANION GAP 6 mmol/L    BUN 15 5 - 25 mg/dL    Creatinine 0.78 0.60 - 1.30 mg/dL    Glucose, Fasting 80 65 - 99 mg/dL    Calcium 9.5 8.4 - 10.2 mg/dL    AST 26 13 - 39 U/L    ALT 31 7 - 52 U/L    Alkaline Phosphatase 70 34 - 104 U/L    Total Protein 7.3 6.4 - 8.4 g/dL    Albumin 4.3 3.5 - 5.0 g/dL    Total Bilirubin 0.67 0.20 - 1.00 mg/dL    eGFR 84 ml/min/1.73sq m   Lipid panel    Collection Time: 03/05/24  7:05 AM   Result Value Ref Range    Cholesterol 208 (H) See Comment mg/dL    Triglycerides 85 See Comment mg/dL    HDL, Direct 67 >=50 mg/dL    LDL Calculated 124 (H) 0 - 100 mg/dL    Non-HDL-Chol (CHOL-HDL) 141 mg/dl   Hemoglobin A1C    Collection  Time: 03/05/24  7:05 AM   Result Value Ref Range    Hemoglobin A1C 6.1 (H) Normal 4.0-5.6%; PreDiabetic 5.7-6.4%; Diabetic >=6.5%; Glycemic control for adults with diabetes <7.0% %     mg/dl   TSH, 3rd generation with Free T4 reflex    Collection Time: 03/05/24  7:05 AM   Result Value Ref Range    TSH 3RD GENERATON 3.073 0.450 - 4.500 uIU/mL       The following portions of the patient's history were reviewed and updated as appropriate: allergies, current medications, past family history, past medical history, past social history, past surgical history and problem list.     Review of Systems   Constitutional:  Negative for appetite change, chills, diaphoresis, fatigue, fever and unexpected weight change.   HENT:  Positive for rhinorrhea and sinus pressure. Negative for congestion and hearing loss.    Eyes:  Negative for visual disturbance.   Respiratory:  Negative for cough, chest tightness, shortness of breath and wheezing.    Cardiovascular:  Negative for chest pain, palpitations and leg swelling.   Gastrointestinal:  Negative for abdominal pain and blood in stool.   Endocrine: Negative for cold intolerance, heat intolerance, polydipsia and polyuria.   Genitourinary:  Negative for difficulty urinating, dysuria, frequency and urgency.   Musculoskeletal:  Negative for arthralgias and myalgias.   Skin:  Negative for rash.   Neurological:  Positive for dizziness and light-headedness. Negative for weakness and headaches.   Hematological:  Does not bruise/bleed easily.   Psychiatric/Behavioral:  Negative for dysphoric mood and sleep disturbance.          Objective:      Vitals:    03/07/24 1619   BP: (!) 89/64   Pulse: 96   SpO2:           Physical Exam  Constitutional:       Appearance: She is well-developed.   HENT:      Head: Normocephalic and atraumatic.      Nose: Nose normal.   Eyes:      General: No scleral icterus.     Conjunctiva/sclera: Conjunctivae normal.      Pupils: Pupils are equal, round, and  reactive to light.   Neck:      Thyroid: No thyromegaly.      Vascular: No JVD.      Trachea: No tracheal deviation.   Cardiovascular:      Rate and Rhythm: Normal rate and regular rhythm.      Heart sounds: No murmur heard.     No friction rub. No gallop.   Pulmonary:      Effort: Pulmonary effort is normal. No respiratory distress.      Breath sounds: Normal breath sounds. No wheezing or rales.   Musculoskeletal:         General: No deformity.      Cervical back: Normal range of motion and neck supple.   Lymphadenopathy:      Cervical: No cervical adenopathy.   Skin:     General: Skin is warm and dry.      Coloration: Skin is not pale.      Findings: No erythema, lesion or rash.   Neurological:      Mental Status: She is alert and oriented to person, place, and time.      Cranial Nerves: No cranial nerve deficit.   Psychiatric:         Behavior: Behavior normal.         Thought Content: Thought content normal.         Judgment: Judgment normal.

## 2024-03-08 DIAGNOSIS — Z12.83 SKIN CANCER SCREENING: Primary | ICD-10-CM

## 2024-03-13 ENCOUNTER — OFFICE VISIT (OUTPATIENT)
Dept: PHYSICAL THERAPY | Facility: CLINIC | Age: 59
End: 2024-03-13
Payer: COMMERCIAL

## 2024-03-13 ENCOUNTER — APPOINTMENT (OUTPATIENT)
Dept: LAB | Facility: CLINIC | Age: 59
End: 2024-03-13
Payer: COMMERCIAL

## 2024-03-13 DIAGNOSIS — M25.511 ACUTE PAIN OF RIGHT SHOULDER: Primary | ICD-10-CM

## 2024-03-13 DIAGNOSIS — E53.8 B12 DEFICIENCY: ICD-10-CM

## 2024-03-13 LAB
FOLATE SERPL-MCNC: >22.3 NG/ML
VIT B12 SERPL-MCNC: 254 PG/ML (ref 180–914)

## 2024-03-13 PROCEDURE — 97112 NEUROMUSCULAR REEDUCATION: CPT | Performed by: PHYSICAL THERAPIST

## 2024-03-13 PROCEDURE — 82746 ASSAY OF FOLIC ACID SERUM: CPT

## 2024-03-13 PROCEDURE — 97110 THERAPEUTIC EXERCISES: CPT | Performed by: PHYSICAL THERAPIST

## 2024-03-13 PROCEDURE — 97140 MANUAL THERAPY 1/> REGIONS: CPT | Performed by: PHYSICAL THERAPIST

## 2024-03-13 PROCEDURE — 82607 VITAMIN B-12: CPT

## 2024-03-13 PROCEDURE — 36415 COLL VENOUS BLD VENIPUNCTURE: CPT

## 2024-03-13 NOTE — PROGRESS NOTES
"Daily Note     Today's date: 3/13/2024  Patient name: Poly Youssef  : 1965  MRN: [id number]  Referring provider: Kellie Stevens CRNP  Dx:   Encounter Diagnosis     ICD-10-CM    1. Acute pain of right shoulder  M25.511           Start Time: 0758  Stop Time: 0845  Total time in clinic (min): 47 minutes    Subjective: Pt reports she was sick over the past week and was unable to perform her home exercises.       Objective: See treatment diary below      Assessment: Tolerated treatment well. Patient demonstrated fatigue post treatment, exhibited good technique with therapeutic exercises, and would benefit from continued PT. Pt was able to progress today with inclusion of tband IR into pt's exercise program. Pt continues to demonstrate significantly improved overhead motion, however, deficits in strength of her external rotators and IR motion persist.Pt required min verbal cues to facilitate performance of proper technique. Assess pt response to treatment at next visit.      Plan: Continue per plan of care.      Precautions: Cardiac and cancer history, pacemaker    POC expires Unit limit Auth Expiration date PT/OT + Visit Limit?    N/A N/A 120, medical review after 20                 Visit/Unit Tracking  AUTH Status:  Date 2/21 2/26 3/4 3/6 3/13          20 Used 1 2 3 4 5           Remaining  19 18 17 16 15            FOTO      Manuals 2/21 2/26 3/4 3/6 3/13        PROM R shoulder JOE JOE JOE JOE JOE        Post/inf GH mobilizations JOE JOE JOE JOE JOE        MWM IR supine    JOE JOE        SSMP humeral depression  JOE JOE JOE         Post mobilization c ER JOE JOE JOE JOE         Neuro Re-Ed             Pt education 5' 4' 4' 4' 4'        Gely rows    2x10 14# 2x10 14#        Prone I  2x10 2x10 2x10 2# 2x10 2#        Prone T  1x10 2x10 2x10  2x10        Sidelying ER   2x10 2# 2x10 2# 2x10 2#        Standing extension c retraction   1x15 3\" 2x10 3\" 2x10 3\"        Gely extensoins    2x10 12# 2x10 12#        Ther " "Ex             IR ecc c DB burnout   Max reps 4# Max reps 5# Max reps 6#        Sidelying abduction   2x10 2# 2x10 2# 2x10 2#        IR stretch c strap   1x10 10\"          Tband IR     3x10 blue        Lat stretch c cane  1x10 10\" 1x10 10\" 1x10 10\" 1x10 10\"        Pullies flex/abd  2x10 2' ea 2' ea 2' ea        UBE for UE strength    5' 5'                     Ther Activity                                       Gait Training                                       Modalities                                                  "

## 2024-03-18 ENCOUNTER — OFFICE VISIT (OUTPATIENT)
Dept: PHYSICAL THERAPY | Facility: CLINIC | Age: 59
End: 2024-03-18
Payer: COMMERCIAL

## 2024-03-18 DIAGNOSIS — M25.511 ACUTE PAIN OF RIGHT SHOULDER: Primary | ICD-10-CM

## 2024-03-18 PROCEDURE — 97110 THERAPEUTIC EXERCISES: CPT | Performed by: PHYSICAL THERAPIST

## 2024-03-18 PROCEDURE — 97112 NEUROMUSCULAR REEDUCATION: CPT | Performed by: PHYSICAL THERAPIST

## 2024-03-18 PROCEDURE — 97140 MANUAL THERAPY 1/> REGIONS: CPT | Performed by: PHYSICAL THERAPIST

## 2024-03-18 NOTE — PROGRESS NOTES
"Daily Note     Today's date: 3/18/2024  Patient name: Poly Youssef  : 1965  MRN: [id number]  Referring provider: Kellie Stevens CRNP  Dx:   Encounter Diagnosis     ICD-10-CM    1. Acute pain of right shoulder  M25.511           Start Time: 0800  Stop Time: 0845  Total time in clinic (min): 45 minutes    Subjective: Pt reports she is having increased soreness in her R shoulder today and is unsure why.      Objective: See treatment diary below      Assessment: Tolerated treatment well. Patient demonstrated fatigue post treatment, exhibited good technique with therapeutic exercises, and would benefit from continued PT. Pt program modified today secondary to an exacerbation in her R shoulder symptoms. Initiated sleeper stretch today which did help improve pt's motion and decrease pain of the R shoulder. Pt required min verbal cues to facilitate performance of proper technique. Assess pt response to treatment at next visit.      Plan: Continue per plan of care.      Precautions: Cardiac and cancer history, pacemaker    POC expires Unit limit Auth Expiration date PT/OT + Visit Limit?    N/A N/A 120, medical review after 20                 Visit/Unit Tracking  AUTH Status:  Date 2/21 2/26 3/4 3/6 3/13 FOTO 3/18         20 Used 1 2 3 4 5 6          Remaining  19 18 17 16 15 14           FOTO, done on 3/13      Manuals 2/21 2/26 3/4 3/6 3/13 3/18       PROM R shoulder JOE JOE JOE JOE JOE JOE       Post/inf GH mobilizations JOE JOE JOE JOE JOE JOE       MWM IR supine    JOE JOE JOE       SSMP humeral depression  JOE JOE JOE  JOE       Post mobilization c ER JOE JOE JOE JOE         Neuro Re-Ed             Pt education 5' 4' 4' 4' 4' 4'       [place] rows    2x10 14# 2x10 14# 2x10 17#       Prone I  2x10 2x10 2x10 2# 2x10 2# NV       Prone T  1x10 2x10 2x10  2x10 NV       Sidelying ER   2x10 2# 2x10 2# 2x10 2# 2x10 2#       Standing extension c retraction   1x15 3\" 2x10 3\" 2x10 3\" 2x10 3\"       Scapuar retraction c depression " "     2x10 3\"       [place] extensoins    2x10 12# 2x10 12# 2x10 12#       Ther Ex             IR ecc c DB burnout   Max reps 4# Max reps 5# Max reps 6#        Sidelying abduction   2x10 2# 2x10 2# 2x10 2#        Sleeper stretch      1x10 10\"       Tband IR     3x10 blue        Lat stretch c cane  1x10 10\" 1x10 10\" 1x10 10\" 1x10 10\" 1x10 10\"       Pullies flex/abd  2x10 2' ea 2' ea 2' ea 2' ea       UBE for UE strength    5' 5' 5'                    Ther Activity                                       Gait Training                                       Modalities                                                    "

## 2024-03-20 ENCOUNTER — OFFICE VISIT (OUTPATIENT)
Dept: PHYSICAL THERAPY | Facility: CLINIC | Age: 59
End: 2024-03-20
Payer: COMMERCIAL

## 2024-03-20 DIAGNOSIS — M25.511 ACUTE PAIN OF RIGHT SHOULDER: Primary | ICD-10-CM

## 2024-03-20 PROCEDURE — 97140 MANUAL THERAPY 1/> REGIONS: CPT | Performed by: PHYSICAL THERAPIST

## 2024-03-20 PROCEDURE — 97110 THERAPEUTIC EXERCISES: CPT | Performed by: PHYSICAL THERAPIST

## 2024-03-20 PROCEDURE — 97112 NEUROMUSCULAR REEDUCATION: CPT | Performed by: PHYSICAL THERAPIST

## 2024-03-20 NOTE — PROGRESS NOTES
Daily Note     Today's date: 3/20/2024  Patient name: Poly Youssef  : 1965  MRN: [id number]  Referring provider: Kellie Stevens CRNP  Dx:   Encounter Diagnosis     ICD-10-CM    1. Acute pain of right shoulder  M25.511           Start Time: 0758  Stop Time: 0845  Total time in clinic (min): 47 minutes    Subjective: Pt reports having decreased pain in her R shoulder, however, limitations in IR and abduction remain.       Objective: See treatment diary below      Assessment: Tolerated treatment well. Patient demonstrated fatigue post treatment, exhibited good technique with therapeutic exercises, and would benefit from continued PT. Pt demonstrated significant improvements in R shoulder IR after performance of rotator cuff strengthening exercises, however, significant limitations in abduction remain after performance of manual therapy and exercise. Pt required min verbal cues to facilitate performance of proper technique. Assess pt response to treatment at next visit.      Plan: Continue per plan of care.      Precautions: Cardiac and cancer history, pacemaker    POC expires Unit limit Auth Expiration date PT/OT + Visit Limit?    N/A N/A 120, medical review after 20                 Visit/Unit Tracking  AUTH Status:  Date 2/21 2/26 3/4 3/6 3/13 FOTO 3/18 3/20        20 Used 1 2 3 4 5 6 7         Remaining  19 18 17 16 15 14 13          FOTO, done on 3/13      Manuals  3/4 3/6 3/13 3/18 3/20      PROM R shoulder JOE JOE JOE JOE JOE JOE       Post/inf GH mobilizations JOE JOE JOE JOE JOE JOE JOE      MWM IR supine    JOE JOE JOE       SSMP humeral depression  JOE JOE JOE  JOE JOE      Shoulder impingement opening mobilization       JOE      Neuro Re-Ed             Pt education 5' 4' 4' 4' 4' 4' 4'      [place] rows    2x10 14# 2x10 14# 2x10 17#       Prone I  2x10 2x10 2x10 2# 2x10 2# NV       Prone T  1x10 2x10 2x10  2x10 NV       Sidelying ER   2x10 2# 2x10 2# 2x10 2# 2x10 2# 3x8 2#      Repeated standing  "extension    1x15 3\" 2x10 3\" 2x10 3\" 2x10 3\" 2x10      Banded flexion with ER isometirc       2x10 blue      [place] extensoins    2x10 12# 2x10 12# 2x10 12#       Ther Ex             Tband punch ups       2x8 red      Sidelying abduction   2x10 2# 2x10 2# 2x10 2#        Sleeper stretch      1x10 10\" 1x10 10\"      Tband IR     3x10 blue  3x10 blue      Lat stretch c cane  1x10 10\" 1x10 10\" 1x10 10\" 1x10 10\" 1x10 10\" 1x10 10\"      Pullies flex/abd  2x10 2' ea 2' ea 2' ea 2' ea 2' ea      UBE for UE strength    5' 5' 5' 5'      Tband ER       2x10 grn      Ther Activity                                       Gait Training                                       Modalities                                                      "

## 2024-03-25 ENCOUNTER — OFFICE VISIT (OUTPATIENT)
Dept: PHYSICAL THERAPY | Facility: CLINIC | Age: 59
End: 2024-03-25
Payer: COMMERCIAL

## 2024-03-25 DIAGNOSIS — M25.511 ACUTE PAIN OF RIGHT SHOULDER: Primary | ICD-10-CM

## 2024-03-25 PROCEDURE — 97110 THERAPEUTIC EXERCISES: CPT | Performed by: PHYSICAL THERAPIST

## 2024-03-25 PROCEDURE — 97140 MANUAL THERAPY 1/> REGIONS: CPT | Performed by: PHYSICAL THERAPIST

## 2024-03-25 PROCEDURE — 97112 NEUROMUSCULAR REEDUCATION: CPT | Performed by: PHYSICAL THERAPIST

## 2024-03-25 NOTE — PROGRESS NOTES
Daily Note     Today's date: 3/25/2024  Patient name: Poly Youssef  : 1965  MRN: [id number]  Referring provider: Kellie Stevens CRNP  Dx:   Encounter Diagnosis     ICD-10-CM    1. Acute pain of right shoulder  M25.511           Start Time: 0800  Stop Time: 0845  Total time in clinic (min): 45 minutes    Subjective: Pt reports having increased pain of her R shoulder over the weekend.       Objective: See treatment diary below      Assessment: Tolerated treatment well. Patient demonstrated fatigue post treatment, exhibited good technique with therapeutic exercises, and would benefit from continued PT. Pt was able to progress today with inclusion of serratus pluses into pt's exercise program which improved pt's ability to reach overhead. Pt also continued to demonstrate improved IR after performance of rotator cuff strengthening. Pt required min verbal cues to facilitate performance of proper technique. Assess pt response to treatment at next visit.'      Plan: Continue per plan of care.      Precautions: Cardiac and cancer history, pacemaker    POC expires Unit limit Auth Expiration date PT/OT + Visit Limit?    N/A N/A 120, medical review after 20                 Visit/Unit Tracking  AUTH Status:  Date  3/4 3/6 3/13 FOTO 3/18 3/20 3/25       20 Used 1 2 3 4 5 6 7 8        Remaining  19 18 17 16 15 14 13 12         FOTO, done on 3/13      Manuals  3/4 3/6 3/13 3/18 3/20 3/25     PROM R shoulder JOE JOE JOE JOE JOE JOE       Post/inf GH mobilizations JOE JOE JOE JOE JOE JOE JOE JOE     MWM IR supine    JOE JOE JOE       SSMP humeral depression  JOE JOE JOE  JOE JOE JOE     Shoulder impingement opening mobilization       JOE JOE     Neuro Re-Ed             Pt education 5' 4' 4' 4' 4' 4' 4' 7'     [place] rows    2x10 14# 2x10 14# 2x10 17#       Serratus pluses against table        3x10, 2x10     Prone T  1x10 2x10 2x10  2x10 NV       Sidelying ER   2x10 2# 2x10 2# 2x10 2# 2x10 2# 3x8 2# 3x10 2#    "  Repeated standing extension    1x15 3\" 2x10 3\" 2x10 3\" 2x10 3\" 2x10      Banded flexion with ER isometirc       2x10 blue      [place] extensoins    2x10 12# 2x10 12# 2x10 12#       Ther Ex             Tband punch ups       2x8 red      Sidelying abduction   2x10 2# 2x10 2# 2x10 2#        Standing sleeper stretch      1x10 10\" 1x10 10\" 5x 10\"     Tband IR     3x10 blue  3x10 blue 3x10 blue     Lat stretch c cane  1x10 10\" 1x10 10\" 1x10 10\" 1x10 10\" 1x10 10\" 1x10 10\" 1x10 10\"     Pullies flex/abd  2x10 2' ea 2' ea 2' ea 2' ea 2' ea      UBE for UE strength    5' 5' 5' 5' 5'     Tband ER       2x10 grn 3x10 blue     Ther Activity                                       Gait Training                                       Modalities                                                        "

## 2024-03-27 ENCOUNTER — OFFICE VISIT (OUTPATIENT)
Dept: PHYSICAL THERAPY | Facility: CLINIC | Age: 59
End: 2024-03-27
Payer: COMMERCIAL

## 2024-03-27 DIAGNOSIS — M25.511 ACUTE PAIN OF RIGHT SHOULDER: Primary | ICD-10-CM

## 2024-03-27 PROCEDURE — 97110 THERAPEUTIC EXERCISES: CPT | Performed by: PHYSICAL THERAPIST

## 2024-03-27 PROCEDURE — 97112 NEUROMUSCULAR REEDUCATION: CPT | Performed by: PHYSICAL THERAPIST

## 2024-03-27 PROCEDURE — 97140 MANUAL THERAPY 1/> REGIONS: CPT | Performed by: PHYSICAL THERAPIST

## 2024-03-27 NOTE — PROGRESS NOTES
Daily Note     Today's date: 3/27/2024  Patient name: Poly Youssef  : 1965  MRN: [id number]  Referring provider: Kellie Stevens CRNP  Dx:   Encounter Diagnosis     ICD-10-CM    1. Acute pain of right shoulder  M25.511           Start Time: 0800  Stop Time: 0845  Total time in clinic (min): 45 minutes    Subjective: Pt reports doing well after her last therapy session and performing her home exercises to success with improvements in R shoulder motion.       Objective: See treatment diary below      Assessment: Tolerated treatment well. Patient demonstrated fatigue post treatment, exhibited good technique with therapeutic exercises, and would benefit from continued PT. Pt was able to progress today with inclusion of serratus roll ups c ER into pt's exercise program. Pt also demonstrated improved R shoulder flexion, abduction, and IR after performance of all exercises today. Pt required min verbal cues to facilitate performance of proper technique. Assess pt response to treatment at next visit.      Plan: Continue per plan of care.      Precautions: Cardiac and cancer history, pacemaker    POC expires Unit limit Auth Expiration date PT/OT + Visit Limit?    N/A N/A 120, medical review after 20                 Visit/Unit Tracking  AUTH Status:  Date  3/4 3/6 3/13 FOTO 3/18 3/20 3/25 3/27      20 Used 1 2 3 4 5 6 7 8 9       Remaining  19 18 17 16 15 14 13 12 11        FOTO, done on 3/13      Manuals  3/ 3/6 3/13 3/18 3/20 3/25 3/27    PROM R shoulder JOE JOE JOE JOE JOE JOE       Post/inf GH mobilizations JOE JOE JOE JOE JOE JOE JOE JOE     MWM IR supine    JOE JOE JOE       SSMP humeral depression  JOE JOE JOE  JOE JOE JOE     Shoulder impingement opening mobilization       JOE JOE JOE    Neuro Re-Ed             Pt education 5' 4' 4' 4' 4' 4' 4' 7' 5'    [place] rows    2x10 14# 2x10 14# 2x10 17#   2x10 15#    Serratus pluses against table        3x10, 2x10 3x10    Prone T  1x10 2x10 2x10  2x10 NV      "  Sidelying ER   2x10 2# 2x10 2# 2x10 2# 2x10 2# 3x8 2# 3x10 2# 3x10 2#    Repeated standing extension    1x15 3\" 2x10 3\" 2x10 3\" 2x10 3\" 2x10      Serratus foam roll ups c ER         3x6 grn    Gely extensoins    2x10 12# 2x10 12# 2x10 12#   2x10 10#    Ther Ex             Tband punch ups       2x8 red      Sidelying abduction   2x10 2# 2x10 2# 2x10 2#        Standing sleeper stretch      1x10 10\" 1x10 10\" 5x 10\"     Tband IR     3x10 blue  3x10 blue 3x10 blue 3x10 blue    Lat stretch c cane  1x10 10\" 1x10 10\" 1x10 10\" 1x10 10\" 1x10 10\" 1x10 10\" 1x10 10\" 1x10 10\"    Pullies flex/abd  2x10 2' ea 2' ea 2' ea 2' ea 2' ea      UBE for UE strength    5' 5' 5' 5' 5' 5'    Tband ER       2x10 grn 3x10 blue 3x10 blue    Ther Activity                                       Gait Training                                       Modalities                                                          "

## 2024-04-01 ENCOUNTER — OFFICE VISIT (OUTPATIENT)
Dept: PHYSICAL THERAPY | Facility: CLINIC | Age: 59
End: 2024-04-01
Payer: COMMERCIAL

## 2024-04-01 DIAGNOSIS — M25.511 ACUTE PAIN OF RIGHT SHOULDER: Primary | ICD-10-CM

## 2024-04-01 PROCEDURE — 97110 THERAPEUTIC EXERCISES: CPT | Performed by: PHYSICAL THERAPIST

## 2024-04-01 PROCEDURE — 97140 MANUAL THERAPY 1/> REGIONS: CPT | Performed by: PHYSICAL THERAPIST

## 2024-04-01 PROCEDURE — 97112 NEUROMUSCULAR REEDUCATION: CPT | Performed by: PHYSICAL THERAPIST

## 2024-04-01 NOTE — PROGRESS NOTES
Daily Note     Today's date: 2024  Patient name: Poly Youssef  : 1965  MRN: [id number]  Referring provider: Kellie Stevens CRNP  Dx:   Encounter Diagnosis     ICD-10-CM    1. Acute pain of right shoulder  M25.511           Start Time: 0800  Stop Time: 0845  Total time in clinic (min): 45 minutes    Subjective: Pt reports having an improvement in motion of her R shoulder since her last therapy session.       Objective: See treatment diary below      Assessment: Tolerated treatment well. Patient demonstrated fatigue post treatment, exhibited good technique with therapeutic exercises, and would benefit from continued PT. Pt was able to progress today by increasing resistance for her serratus roll ups and sidelying ER. Also performed anterior GH mobilizations to facilitate glide anteriorly during IR. Pt required min verbal cues to facilitate performance of proper technique. Assess pt response to treatment at next visit.      Plan: Continue per plan of care.      Precautions: Cardiac and cancer history, pacemaker    POC expires Unit limit Auth Expiration date PT/OT + Visit Limit?    N/A N/A 120, medical review after 20                 Visit/Unit Tracking  AUTH Status:  Date  3/ 3/6 3/13 FOTO 3/18 3/20 3/25 3/27 4/     20 Used 1 2 3 4 5 6 7 8 9 10      Remaining  19 18 17 16 15 14 13 12 11 10       FOTO, done on 3/13      Manuals  3/4 3/6 3/13 3/18 3/20 3/25 3/27 4/1   PROM R shoulder JOE JOE JOE JOE JOE JOE       Post/inf/ant GH mobilizations JOE JOE JOE JOE JOE JOE JOE JOE  JOE   MWM IR supine    JOE JOE JOE       SSMP humeral depression  JOE JOE JOE  JOE JOE JOE     Shoulder impingement opening mobilization       JOE JOE JOE JOE   Neuro Re-Ed             Pt education 5' 4' 4' 4' 4' 4' 4' 7' 5' 5'   [place] rows    2x10 14# 2x10 14# 2x10 17#   2x10 15# 3x10 15#   Serratus pluses against table        3x10, 2x10 3x10 3x10   Prone T  1x10 2x10 2x10  2x10 NV       Sidelying ER   2x10 2# 2x10 2# 2x10 2#  "2x10 2# 3x8 2# 3x10 2# 3x10 2# 2x10 3#   Repeated standing extension    1x15 3\" 2x10 3\" 2x10 3\" 2x10 3\" 2x10      Serratus foam roll ups c ER         3x6 grn 3x6 blue   [place] extensoins    2x10 12# 2x10 12# 2x10 12#   2x10 10# 3x10 10#   Ther Ex             Tband punch ups       2x8 red      SA pec stretch          5x 10\"   Standing sleeper stretch      1x10 10\" 1x10 10\" 5x 10\"     Tband IR     3x10 blue  3x10 blue 3x10 blue 3x10 blue 3x10 blue   Lat stretch c cane  1x10 10\" 1x10 10\" 1x10 10\" 1x10 10\" 1x10 10\" 1x10 10\" 1x10 10\" 1x10 10\" 1x10 10\"   Pullies flex/abd  2x10 2' ea 2' ea 2' ea 2' ea 2' ea      UBE for UE strength    5' 5' 5' 5' 5' 5' 5'   Tband ER       2x10 grn 3x10 blue 3x10 blue 3x10 blue   Ther Activity                                       Gait Training                                       Modalities                                                            "

## 2024-04-03 ENCOUNTER — OFFICE VISIT (OUTPATIENT)
Dept: PHYSICAL THERAPY | Facility: CLINIC | Age: 59
End: 2024-04-03
Payer: COMMERCIAL

## 2024-04-03 DIAGNOSIS — M25.511 ACUTE PAIN OF RIGHT SHOULDER: Primary | ICD-10-CM

## 2024-04-03 PROCEDURE — 97110 THERAPEUTIC EXERCISES: CPT | Performed by: PHYSICAL THERAPIST

## 2024-04-03 PROCEDURE — 97112 NEUROMUSCULAR REEDUCATION: CPT | Performed by: PHYSICAL THERAPIST

## 2024-04-03 PROCEDURE — 97140 MANUAL THERAPY 1/> REGIONS: CPT | Performed by: PHYSICAL THERAPIST

## 2024-04-03 NOTE — PROGRESS NOTES
Daily Note     Today's date: 4/3/2024  Patient name: Poly Youssef  : 1965  MRN: [id number]  Referring provider: Kellie Stevens CRNP  Dx:   Encounter Diagnosis     ICD-10-CM    1. Acute pain of right shoulder  M25.511           Start Time: 0800  Stop Time: 0845  Total time in clinic (min): 45 minutes    Subjective: Pt reports having increased shooting pain down her R shoulder when stirring a pot yesterday.       Objective: See treatment diary below      Assessment: Tolerated treatment well. Patient demonstrated fatigue post treatment, exhibited good technique with therapeutic exercises, and would benefit from continued PT. Pt continues to demonstrate elevation of her R shoulder during overhead and cross body movements. Pt was able to progress today with inclusion of tband ER 90-90 into pt's exercise program. Pt required min verbal cues to facilitate performance of proper technique. Assess pt response to treatment at next visit.      Plan: Continue per plan of care.      Precautions: Cardiac and cancer history, pacemaker    POC expires Unit limit Auth Expiration date PT/OT + Visit Limit?    N/A N/A 120, medical review after 20                 Visit/Unit Tracking  AUTH Status:  Date  3/4 3/6 3/13 FOTO 3/18 3/20 3/25 3/27 4/1 4/3    20 Used 1 2 3 4 5 6 7 8 9 10 9     Remaining  19 18 17 16 15 14 13 12 11 10 11      FOTO, done on 3/13      Manuals 4/3     3/18 3/20 3/25 3/27 4/1   PROM R shoulder      JOE       Post/inf/ant GH mobilizations JOE     JOE JOE JOE  JOE   MWM IR supine      JOE       SSMP humeral depression      JOE JOE JOE     Shoulder impingement opening mobilization JOE      JOE JOE JOE JOE   Neuro Re-Ed             Pt education 5'     4' 4' 7' 5' 5'   [place] rows      2x10 17#   2x10 15# 3x10 15#   Serratus pluses against table 3x10       3x10, 2x10 3x10 3x10   Prone T      NV       Sidelying ER 2x10 3#     2x10 2# 3x8 2# 3x10 2# 3x10 2# 2x10 3#   Repeated standing extension       2x10  "3\" 2x10      Serratus foam roll ups c ER 2x8 blue        3x6 grn 3x6 blue   [place] extensoins      2x10 12#   2x10 10# 3x10 10#   Ther Ex             Tband punch ups       2x8 red      SA pec stretch 5x 10\"         5x 10\"   Standing sleeper stretch      1x10 10\" 1x10 10\" 5x 10\"     Tband IR 3x10 blue      3x10 blue 3x10 blue 3x10 blue 3x10 blue   Standing lat stretch 10x 10\"     1x10 10\" 1x10 10\" 1x10 10\" 1x10 10\" 1x10 10\"   Pullies flex/abd      2' ea 2' ea      UBE for UE strength 5'     5' 5' 5' 5' 5'   Tband ER 90-90 2x10 yellow      2x10 grn 3x10 blue 3x10 blue 3x10 blue   Ther Activity                                       Gait Training                                       Modalities                                                              "

## 2024-04-08 ENCOUNTER — OFFICE VISIT (OUTPATIENT)
Dept: PHYSICAL THERAPY | Facility: CLINIC | Age: 59
End: 2024-04-08
Payer: COMMERCIAL

## 2024-04-08 DIAGNOSIS — M25.511 ACUTE PAIN OF RIGHT SHOULDER: Primary | ICD-10-CM

## 2024-04-08 PROCEDURE — 97110 THERAPEUTIC EXERCISES: CPT

## 2024-04-08 PROCEDURE — 97112 NEUROMUSCULAR REEDUCATION: CPT

## 2024-04-08 PROCEDURE — 97140 MANUAL THERAPY 1/> REGIONS: CPT

## 2024-04-08 NOTE — PROGRESS NOTES
"Daily Note     Today's date: 2024  Patient name: Poly Youssef  : 1965  MRN: [id number]  Referring provider: Kellie Stevens CRNP  Dx:   Encounter Diagnosis     ICD-10-CM    1. Acute pain of right shoulder  M25.511           Start Time: 0800  Stop Time: 0845  Total time in clinic (min): 45 minutes    Subjective: Pt reports continued pain in anterior and posterior shoulder but gets temporary relief with physical therapy.      Objective: See treatment diary below      Assessment: Tolerated treatment well. Patient demonstrated fatigue post treatment, exhibited good technique with therapeutic exercises, and would benefit from continued PT. Trialed infraspinatus release this session with pt noting mild changes in symptoms at end range of motion. Added in TB wall lift off to continue improving periscapular muscle activation.      Plan: Progress treatment as tolerated.       Precautions: Cardiac and cancer history, pacemaker    POC expires Unit limit Auth Expiration date PT/OT + Visit Limit?    N/A N/A 120, medical review after 20                 Visit/Unit Tracking  AUTH Status:  Date 2/21 2/26 3/4 3/6 3/13 FOTO 3/18 3/20 3/25 3/27 4/1 4/3 4/8   20 Used 1 2 3 4 5 6 7 8 9 10 9 12    Remaining  19 18 17 16 15 14 13 12 11 10 11 8     FOTO, done on 3/13      Manuals 4/3 4/8    3/18 3/20 3/25 3/27 4/1   PROM R shoulder      JOE       Post/inf/ant GH mobilizations JOE CG    JOE JOE JOE  JOE   MWM IR supine      JOE       SSMP humeral depression      JOE JOE JOE     Shoulder impingement opening mobilization JOE      JOE JOE JOE JOE   DTM infraspinatus  CG           Neuro Re-Ed             Pt education 5' 5'    4' 4' 7' 5' 5'   [place] rows  3x10 15#    2x10 17#   2x10 15# 3x10 15#   Serratus pluses against table 3x10 3x10      3x10, 2x10 3x10 3x10   Prone T      NV       Sidelying ER 2x10 3# 2x10 3#    2x10 2# 3x8 2# 3x10 2# 3x10 2# 2x10 3#   Repeated standing extension       2x10 3\" 2x10      Serratus foam roll ups " "c ER 2x8 blue 2x10 blue       3x6 grn 3x6 blue   [place] extensoins  3x10 10#    2x10 12#   2x10 10# 3x10 10#   TB wall liftoff  ylw   2x10           Ther Ex             Tband punch ups       2x8 red      SA pec stretch 5x 10\"         5x 10\"   Standing sleeper stretch      1x10 10\" 1x10 10\" 5x 10\"     Tband IR 3x10 blue 3x10 blue     3x10 blue 3x10 blue 3x10 blue 3x10 blue   Standing lat stretch 10x 10\" 10x10\"    1x10 10\" 1x10 10\" 1x10 10\" 1x10 10\" 1x10 10\"   Pullies flex/abd      2' ea 2' ea      UBE for UE strength 5' 5'    5' 5' 5' 5' 5'   Tband ER 90-90 2x10 yellow 2x10 ylw     2x10 grn 3x10 blue 3x10 blue 3x10 blue   Ther Activity                                       Gait Training                                       Modalities                                                                "

## 2024-04-10 ENCOUNTER — OFFICE VISIT (OUTPATIENT)
Dept: PHYSICAL THERAPY | Facility: CLINIC | Age: 59
End: 2024-04-10
Payer: COMMERCIAL

## 2024-04-10 DIAGNOSIS — M25.511 ACUTE PAIN OF RIGHT SHOULDER: Primary | ICD-10-CM

## 2024-04-10 PROCEDURE — 97140 MANUAL THERAPY 1/> REGIONS: CPT | Performed by: PHYSICAL THERAPIST

## 2024-04-10 PROCEDURE — 97530 THERAPEUTIC ACTIVITIES: CPT | Performed by: PHYSICAL THERAPIST

## 2024-04-10 PROCEDURE — 97110 THERAPEUTIC EXERCISES: CPT | Performed by: PHYSICAL THERAPIST

## 2024-04-10 PROCEDURE — 97112 NEUROMUSCULAR REEDUCATION: CPT | Performed by: PHYSICAL THERAPIST

## 2024-04-10 NOTE — PROGRESS NOTES
Daily Note     Today's date: 4/10/2024  Patient name: Poly Youssef  : 1965  MRN: [id number]  Referring provider: Kellie Stevens CRNP  Dx:   Encounter Diagnosis     ICD-10-CM    1. Acute pain of right shoulder  M25.511           Start Time: 0800  Stop Time: 0845  Total time in clinic (min): 45 minutes    Subjective: Pt reports having increased pain in her R shoulder when attempting to swat at a fly the other day.       Objective: See treatment diary below      Assessment: Tolerated treatment well. Patient demonstrated fatigue post treatment, exhibited good technique with therapeutic exercises, and would benefit from continued PT. Pt was able to progress today with inclusion of table shoulder taps and push ups to improve pt closed chain strengthening of her R shoulder. Pt required min verbal cues to facilitate performance of proper technique. Assess pt response to treatment at next visit.      Plan: Continue per plan of care.      Precautions: Cardiac and cancer history, pacemaker    POC expires Unit limit Auth Expiration date PT/OT + Visit Limit?    N/A N/A 120, medical review after 20                 Visit/Unit Tracking  AUTH Status:  Date 4/10    3/13 FOTO 3/18 3/20 3/25 3/27 4/1 4/3 4/8   20 Used 13    5 6 7 8 9 10 9 12    Remaining  7    15 14 13 12 11 10 11 8     FOTO, done on 3/13      Manuals 4/3 4/8 4/10   3/18 3/20 3/25 3/27 4/1   PROM R shoulder      JOE       Post/inf/ant GH mobilizations JOE CG JOE   JOE JOE JOE  JOE   MWM 90-90 ER   JOE   JOE       SSMP humeral depression      JOE JOE JOE     Shoulder impingement opening mobilization JOE  JOE    JOE JOE JOE JOE   DTM infraspinatus  CG           Neuro Re-Ed             Pt education 5' 5' 5'   4' 4' 7' 5' 5'   [place] rows  3x10 15#    2x10 17#   2x10 15# 3x10 15#   Serratus pluses against table 3x10 3x10 3x10     3x10, 2x10 3x10 3x10   Table push ups c slight elevation   2x10   NV       Sidelying ER 2x10 3# 2x10 3# 3x10 3#   2x10 2# 3x8 2# 3x10 2#  "3x10 2# 2x10 3#   Repeated standing extension       2x10 3\" 2x10      Serratus foam roll ups c ER 2x8 blue 2x10 blue 2x10 blue      3x6 grn 3x6 blue   [place] extensoins  3x10 10#    2x10 12#   2x10 10# 3x10 10#   TB wall liftoff  ylw   2x10           Ther Ex             Tband punch ups       2x8 red      SA pec stretch 5x 10\"         5x 10\"   Table shoulder taps   1x15 ea          Tband IR 3x10 blue 3x10 blue 3x10 blue    3x10 blue 3x10 blue 3x10 blue 3x10 blue   Standing lat stretch 10x 10\" 10x10\"    1x10 10\" 1x10 10\" 1x10 10\" 1x10 10\" 1x10 10\"   Tband ER   3x10 blue          UBE for UE strength 5' 5' 5'   5' 5' 5' 5' 5'   Tband ER 90-90 2x10 yellow 2x10 ylw 2x10 yellw    2x10 grn 3x10 blue 3x10 blue 3x10 blue   Ther Activity             90-90 DB presses   3x10 3#                       Gait Training                                       Modalities                                                                  "

## 2024-04-15 ENCOUNTER — OFFICE VISIT (OUTPATIENT)
Dept: PHYSICAL THERAPY | Facility: CLINIC | Age: 59
End: 2024-04-15
Payer: COMMERCIAL

## 2024-04-15 DIAGNOSIS — M25.511 ACUTE PAIN OF RIGHT SHOULDER: Primary | ICD-10-CM

## 2024-04-15 PROCEDURE — 97112 NEUROMUSCULAR REEDUCATION: CPT

## 2024-04-15 PROCEDURE — 97140 MANUAL THERAPY 1/> REGIONS: CPT

## 2024-04-15 PROCEDURE — 97110 THERAPEUTIC EXERCISES: CPT

## 2024-04-15 NOTE — PROGRESS NOTES
Daily Note     Today's date: 4/15/2024  Patient name: Poly Youssef  : 1965  MRN: [id number]  Referring provider: Kellie Stevens CRNP  Dx:   Encounter Diagnosis     ICD-10-CM    1. Acute pain of right shoulder  M25.511           Start Time: 0800  Stop Time: 0845  Total time in clinic (min): 45 minutes    Subjective: Pt reports she is still having pain in her shoulder but noted less pain after last session.       Objective: See treatment diary below      Assessment: Tolerated treatment well. Patient demonstrated fatigue post treatment, exhibited good technique with therapeutic exercises, and would benefit from continued PT. Pt continues to work through program denying any new or increased symptoms during or following tx. She has improved ROM with decreased pain following manual interventions but continues to be most challenged with shoulder IR.       Plan: Continue per plan of care.      Precautions: Cardiac and cancer history, pacemaker    POC expires Unit limit Auth Expiration date PT/OT + Visit Limit?    N/A N/A 120, medical review after 20                 Visit/Unit Tracking  AUTH Status:  Date 4/10 4/15   3/13 FOTO 3/18 3/20 3/25 3/27 4/1 4/3 4/8   20 Used 13 14   5 6 7 8 9 10 9 12    Remaining  7 6   15 14 13 12 11 10 11 8     FOTO, done on 3/13      Manuals 4/3 4/8 4/10 4/15  3/18 3/20 3/25 3/27 4/1   PROM R shoulder      JOE       Post/inf/ant GH mobilizations JOE CG JOE CG  JOE JOE JOE  JOE   MWM 90-90 ER   JOE CG  JOE       SSMP humeral depression      JOE JOE JOE     Shoulder impingement opening mobilization JOE  JOE    JOE JOE JOE JOE   DTM infraspinatus  CG           Neuro Re-Ed             Pt education 5' 5' 5'   4' 4' 7' 5' 5'   [place] rows  3x10 15#  3x10 15#  2x10 17#   2x10 15# 3x10 15#   Serratus pluses against table 3x10 3x10 3x10 3x10    3x10, 2x10 3x10 3x10   Table push ups c slight elevation   2x10 2x10  NV       Sidelying ER 2x10 3# 2x10 3# 3x10 3# 3x10 3#  2x10 2# 3x8 2# 3x10 2# 3x10  "2# 2x10 3#   Repeated standing extension       2x10 3\" 2x10      Serratus foam roll ups c ER 2x8 blue 2x10 blue 2x10 blue 2x10 grn     3x6 grn 3x6 blue   [place] extensoins  3x10 10#  3x10 10#  2x10 12#   2x10 10# 3x10 10#   TB wall liftoff  ylw   2x10           Ther Ex             Tband punch ups       2x8 red      SA pec stretch 5x 10\"         5x 10\"   Table shoulder taps   1x15 ea 1x15 ea         Tband IR 3x10 blue 3x10 blue 3x10 blue 3x10 blue   3x10 blue 3x10 blue 3x10 blue 3x10 blue   Standing lat stretch 10x 10\" 10x10\"  10x 10\"  1x10 10\" 1x10 10\" 1x10 10\" 1x10 10\" 1x10 10\"   Tband ER   3x10 blue 3x10 blue         UBE for UE strength 5' 5' 5' 5'  5' 5' 5' 5' 5'   Tband ER 90-90 2x10 yellow 2x10 ylw 2x10 yellw 2x10 yellow   2x10 grn 3x10 blue 3x10 blue 3x10 blue   Ther Activity             90-90 DB presses   3x10 3#                       Gait Training                                       Modalities                                                                    "

## 2024-04-17 ENCOUNTER — OFFICE VISIT (OUTPATIENT)
Dept: PHYSICAL THERAPY | Facility: CLINIC | Age: 59
End: 2024-04-17
Payer: COMMERCIAL

## 2024-04-17 DIAGNOSIS — M25.511 ACUTE PAIN OF RIGHT SHOULDER: Primary | ICD-10-CM

## 2024-04-17 PROCEDURE — 97110 THERAPEUTIC EXERCISES: CPT | Performed by: PHYSICAL THERAPIST

## 2024-04-17 PROCEDURE — 97112 NEUROMUSCULAR REEDUCATION: CPT | Performed by: PHYSICAL THERAPIST

## 2024-04-17 PROCEDURE — 97140 MANUAL THERAPY 1/> REGIONS: CPT | Performed by: PHYSICAL THERAPIST

## 2024-04-17 NOTE — PROGRESS NOTES
PT Re-Evaluation     Today's date: 2024  Patient name: Poly Youssef  : 1965  MRN: [id number]  Referring provider: Kellie Stevens CRNP  Dx:   Encounter Diagnosis     ICD-10-CM    1. Acute pain of right shoulder  M25.511           Start Time: 0800  Stop Time: 0845  Total time in clinic (min): 45 minutes    Assessment  Assessment details: Pt is a 57 y/o presenting to physical therapy with R shoulder pain and restrictions in motion. Upon re-examination, pt has improved strength and ROM of pt's R shoulder as well as decreased pain of the same regions. Pt's FOTO score has improved, demonstrating an improved ability to perform functional activities. However, pt continues to have deficits in strength and ROM of pt's R shoulder as well as pain and difficulty with functional activities. Pt plan of care will focus on improving the previously mentioned deficits and limitations. Pt would benefit from skilled physical therapy to facilitate a return to her prior level of function.                   Impairments: abnormal muscle tone, abnormal or restricted ROM, activity intolerance, impaired physical strength, lacks appropriate home exercise program and pain with function  Functional limitations: self-care/ADLs, household activiities, reaching overhead, and lifting objectsUnderstanding of Dx/Px/POC: good   Prognosis: good    Goals  ST weeks  1. Pt will have a subjective decrease in pain of pt's R shoulder by 2 levels or more during overhead reaching Ongoing  2. Pt's FOTO score will improve by 10 points or better to facilitate a return to pt's prior level of function. Ongoing      LT weeks  1. Pt will demonstrate 4+/5 gross strength or better of her R shoulder in all planes to facilitate a return to her prior level of function Ongoing  2. Pt will demonstrate WNL AROM of her R shoulder in all planes to facilitate a return to her prior level of function Ongoing    Plan  Patient would benefit from: PT  eval and skilled physical therapy  Planned modality interventions: cryotherapy, thermotherapy: hydrocollator packs and unattended electrical stimulation  Planned therapy interventions: IASTM, joint mobilization, kinesiology taping, manual therapy, nerve gliding, neuromuscular re-education, patient education, postural training, strengthening, stretching, therapeutic activities, therapeutic exercise, activity modification, balance/weight bearing training, flexibility, functional ROM exercises and home exercise program  Frequency: 2x week  Duration in weeks: 8  Plan of Care beginning date: 2024  Plan of Care expiration date: 2024  Treatment plan discussed with: patient        Subjective Evaluation    History of Present Illness  Mechanism of injury: Pt is a 59 y/o presenting to physical therapy with R shoulder pain and restrictions in motion. Pt reports having improvements in her R shoulder pain, however, she had an exacerbation when playing with her dogs the other day. Pt reports also having improvements in strength and ROM of her R shoulder especially with overhead motions. However, pt reports she continues to have significant pain with reaching overhead and now reporting a popping/clicking in her R shoulder. Pt reports she also continues to be very limited with reaching behind her back. Pt reports she has overall improved since starting therapy.     Patient Goals  Patient goals for therapy: increased motion, decreased pain, increased strength, independence with ADLs/IADLs and return to sport/leisure activities    Pain  Current pain ratin  At best pain ratin  At worst pain ratin  Location: R shoulder  Quality: throbbing  Relieving factors: rest, heat and medications  Aggravating factors: lifting and overhead activity  Progression: worsening    Treatments  Current treatment: physical therapy        Objective     Palpation     Right   Tenderness of the latissimus, middle deltoid and supraspinatus.      Tenderness     Right Shoulder  Tenderness in the lateral scapula and supraspinatus tendon.     Active Range of Motion     Right Shoulder   Flexion: 150 degrees with pain  Abduction: 150 degrees with pain  External rotation 45°: 65 degrees with pain  External rotation BTH: C4   Internal rotation 45°: 50 degrees with pain  Internal rotation BTB: sacrum     Scapular Mobility     Right Shoulder   Scapular mobility: fair    Joint Play     Right Shoulder  Hypomobile in the posterior capsule and inferior capsule.     Strength/Myotome Testing     Right Shoulder     Planes of Motion   Flexion: 4-   Extension: 4-   Abduction: 3-   External rotation at 0°: 3   Internal rotation at 0°: 4     Isolated Muscles   Lower trapezius: 3-   Middle trapezius: 3-     Tests     Right Shoulder   Positive Hawkin's.              Precautions: Cardiac and cancer history, pacemaker    POC expires Unit limit Auth Expiration date PT/OT + Visit Limit?   6/12 N/A N/A 120, medical review after 20                 Visit/Unit Tracking  AUTH Status:  Date 4/10 4/15 4/17  3/13 FOTO 3/18 3/20 3/25 3/27 4/1 4/3 4/8   20 Used 13 14 15  5 6 7 8 9 10 9 12    Remaining  7 6 5  15 14 13 12 11 10 11 8     FOTO, done on 3/13      Manuals 4/3 4/8 4/10 4/15 4/17        PROM R shoulder             Post/inf/ant GH mobilizations JOE CG JOE CG JOE        MWM 90-90 ER   JOE CG JOE        SSMP humeral depression     JOE        Shoulder impingement opening mobilization JOE  JOE  JOE        DTM infraspinatus  CG   JOE        Neuro Re-Ed             Pt education 5' 5' 5'  10'        [place] rows  3x10 15#  3x10 15#         Serratus pluses against table 3x10 3x10 3x10 3x10         Table push ups c slight elevation   2x10 2x10         Sidelying ER 2x10 3# 2x10 3# 3x10 3# 3x10 3#         Repeated standing extension              Serratus foam roll ups c ER 2x8 blue 2x10 blue 2x10 blue 2x10 grn         [place] extensoins  3x10 10#  3x10 10#         TB wall liftoff  ylw   2x10          "  Ther Ex             pullies     2' ea flex/abd        Finger ladder     1x10        Table shoulder taps   1x15 ea 1x15 ea         Tband IR 3x10 blue 3x10 blue 3x10 blue 3x10 blue         seated lat stretch 10x 10\" 10x10\"  10x 10\" 10x 10\"        Tband ER   3x10 blue 3x10 blue         UBE for UE strength 5' 5' 5' 5' 5'        Tband ER 90-90 2x10 yellow 2x10 ylw 2x10 yellw 2x10 yellow         Ther Activity             90-90 DB presses   3x10 3#                       Gait Training                                       Modalities                                              "

## 2024-04-22 ENCOUNTER — OFFICE VISIT (OUTPATIENT)
Dept: PHYSICAL THERAPY | Facility: CLINIC | Age: 59
End: 2024-04-22
Payer: COMMERCIAL

## 2024-04-22 DIAGNOSIS — M25.511 ACUTE PAIN OF RIGHT SHOULDER: Primary | ICD-10-CM

## 2024-04-22 PROCEDURE — 97110 THERAPEUTIC EXERCISES: CPT

## 2024-04-22 PROCEDURE — 97140 MANUAL THERAPY 1/> REGIONS: CPT

## 2024-04-22 PROCEDURE — 97112 NEUROMUSCULAR REEDUCATION: CPT

## 2024-04-22 NOTE — PROGRESS NOTES
Daily Note     Today's date: 2024  Patient name: Poly Youssef  : 1965  MRN: [id number]  Referring provider: Kellie Stevens CRNP  Dx:   Encounter Diagnosis     ICD-10-CM    1. Acute pain of right shoulder  M25.511           Start Time: 0800  Stop Time: 0845  Total time in clinic (min): 45 minutes    Subjective: Pt reports she has more range of motion but continues to have constant pain.       Objective: See treatment diary below      Assessment: Tolerated treatment well. Patient demonstrated fatigue post treatment, exhibited good technique with therapeutic exercises, and would benefit from continued PT. Pt continues to be challenged by program but denies any new or increased symptoms during or following session. Range of motion is improving but continues to be most limited at end range flexion.      Plan: Progress treatment as tolerated.       Precautions: Cardiac and cancer history, pacemaker    POC expires Unit limit Auth Expiration date PT/OT + Visit Limit?    N/A N/A 120, medical review after 20                 Visit/Unit Tracking  AUTH Status:  Date 4/10 4/15 4/17 4/22  3/18 3/20 3/25 3/27 4/1 4/3 4/8   20 Used 13 14 15 16  6 7 8 9 10 9 12    Remaining  7 6 5 4  14 13 12 11 10 11 8     FOTO, done on 3/13      Manuals 4/3 4/8 4/10 4/15 4/17 4/22       PROM R shoulder             Post/inf/ant GH mobilizations JOE CG JOE CG JOE CG       MWM 90-90 ER   JOE CG JOE CG       SSMP humeral depression     JOE        Shoulder impingement opening mobilization JOE  JOE  JOE        DTM infraspinatus  CG   JOE CG       Neuro Re-Ed             Pt education 5' 5' 5'  10'        [place] rows  3x10 15#  3x10 15#         Serratus pluses against table 3x10 3x10 3x10 3x10  3x10       Table push ups c slight elevation   2x10 2x10  2x10       Sidelying ER 2x10 3# 2x10 3# 3x10 3# 3x10 3#  3x10 3#       Repeated standing extension              Serratus foam roll ups c ER 2x8 blue 2x10 blue 2x10 blue 2x10 grn        "  [place] extensoins  3x10 10#  3x10 10#         TB wall liftoff  ylw   2x10           Ther Ex             pullies     2' ea flex/abd 2' ea       Finger ladder     1x10        Table shoulder taps   1x15 ea 1x15 ea  1x15 ea       Tband IR 3x10 blue 3x10 blue 3x10 blue 3x10 blue  3x10 blue       seated lat stretch 10x 10\" 10x10\"  10x 10\" 10x 10\"        Tband ER   3x10 blue 3x10 blue  3x10 blue       UBE for UE strength 5' 5' 5' 5' 5' 5'       Tband ER 90-90 2x10 yellow 2x10 ylw 2x10 yellw 2x10 yellow         Ther Activity             90-90 DB presses   3x10 3#                       Gait Training                                       Modalities                                              "

## 2024-04-24 ENCOUNTER — OFFICE VISIT (OUTPATIENT)
Dept: PHYSICAL THERAPY | Facility: CLINIC | Age: 59
End: 2024-04-24
Payer: COMMERCIAL

## 2024-04-24 DIAGNOSIS — M25.511 ACUTE PAIN OF RIGHT SHOULDER: Primary | ICD-10-CM

## 2024-04-24 PROCEDURE — 97112 NEUROMUSCULAR REEDUCATION: CPT | Performed by: PHYSICAL THERAPIST

## 2024-04-24 PROCEDURE — 97140 MANUAL THERAPY 1/> REGIONS: CPT | Performed by: PHYSICAL THERAPIST

## 2024-04-24 PROCEDURE — 97110 THERAPEUTIC EXERCISES: CPT | Performed by: PHYSICAL THERAPIST

## 2024-04-24 NOTE — PROGRESS NOTES
Daily Note     Today's date: 2024  Patient name: Poly Youssef  : 1965  MRN: [id number]  Referring provider: Kellie Stevens CRNP  Dx:   Encounter Diagnosis     ICD-10-CM    1. Acute pain of right shoulder  M25.511           Start Time: 0800  Stop Time: 0845  Total time in clinic (min): 45 minutes    Subjective: Pt reports she continues to have increased pain and limited motion of her R shoulder at today's session.       Objective: See treatment diary below      Assessment: Tolerated treatment well. Patient demonstrated fatigue post treatment, exhibited good technique with therapeutic exercises, and would benefit from continued PT. Pt had a reduction in pain with grade III/IV posterior mobilization of her R shoulder along with MWM ER and flexion of her R shoulder at today's session. Pt required min verbal cues to facilitate performance of proper technique. Assess pt response to treatment at next visit.      Plan: Continue per plan of care.      Precautions: Cardiac and cancer history, pacemaker    POC expires Unit limit Auth Expiration date PT/OT + Visit Limit?    N/A N/A 120, medical review after 20                 Visit/Unit Tracking  AUTH Status:  Date 4/10 4/15 4/17 4/22 4/24      4/3 4/8   20 Used 13 14 15 16 17      9 12    Remaining  7 6 5 4 3      11 8     FOTO, done on 3/13      Manuals 4/3 4/8 4/10 4/15 4/17 4/22 4/24      PROM R shoulder             Post/inf/ant GH mobilizations JOE CG JOE CG JOE CG JOE      MWM 90-90 ER   JOE CG JOE CG JOE      SSMP humeral depression     JOE        Shoulder impingement opening mobilization JOE  JOE  JOE        DTM infraspinatus  CG   JOE CG       Neuro Re-Ed             Pt education 5' 5' 5'  10'  10'      [place] rows  3x10 15#  3x10 15#         Serratus pluses against table 3x10 3x10 3x10 3x10  3x10       Table push ups c slight elevation   2x10 2x10  2x10       Sidelying ER 2x10 3# 2x10 3# 3x10 3# 3x10 3#  3x10 3#       Repeated standing extension               Serratus foam roll ups c ER 2x8 blue 2x10 blue 2x10 blue 2x10 grn         [place] extensoins  3x10 10#  3x10 10#         TB wall liftoff  ylw   2x10           Ther Ex             pullies     2' ea flex/abd 2' ea 2' ea      MWM ER c heavy band at rack       30x grn      Table shoulder taps   1x15 ea 1x15 ea  1x15 ea       Tband IR 3x10 blue 3x10 blue 3x10 blue 3x10 blue  3x10 blue 3x10 blue      MWM flexion c heavy back at rack       20x grn      Tband ER   3x10 blue 3x10 blue  3x10 blue 3x10 blue      UBE for UE strength 5' 5' 5' 5' 5' 5' 5'      Tband ER 90-90 2x10 yellow 2x10 ylw 2x10 yellw 2x10 yellow         Ther Activity             90-90 DB presses   3x10 3#                       Gait Training                                       Modalities

## 2024-04-29 ENCOUNTER — APPOINTMENT (OUTPATIENT)
Dept: RADIOLOGY | Facility: CLINIC | Age: 59
End: 2024-04-29
Payer: COMMERCIAL

## 2024-04-29 ENCOUNTER — OFFICE VISIT (OUTPATIENT)
Dept: PHYSICAL THERAPY | Facility: CLINIC | Age: 59
End: 2024-04-29
Payer: COMMERCIAL

## 2024-04-29 ENCOUNTER — OFFICE VISIT (OUTPATIENT)
Dept: OBGYN CLINIC | Facility: CLINIC | Age: 59
End: 2024-04-29
Payer: COMMERCIAL

## 2024-04-29 VITALS
DIASTOLIC BLOOD PRESSURE: 79 MMHG | HEART RATE: 90 BPM | WEIGHT: 157 LBS | HEIGHT: 66 IN | SYSTOLIC BLOOD PRESSURE: 135 MMHG | BODY MASS INDEX: 25.23 KG/M2

## 2024-04-29 DIAGNOSIS — M25.562 LEFT KNEE PAIN, UNSPECIFIED CHRONICITY: ICD-10-CM

## 2024-04-29 DIAGNOSIS — M25.511 RIGHT SHOULDER PAIN, UNSPECIFIED CHRONICITY: ICD-10-CM

## 2024-04-29 DIAGNOSIS — M25.511 ACUTE PAIN OF RIGHT SHOULDER: Primary | ICD-10-CM

## 2024-04-29 DIAGNOSIS — M24.811 INTERNAL DERANGEMENT OF RIGHT SHOULDER: Primary | ICD-10-CM

## 2024-04-29 PROCEDURE — 97110 THERAPEUTIC EXERCISES: CPT | Performed by: PHYSICAL THERAPIST

## 2024-04-29 PROCEDURE — 99204 OFFICE O/P NEW MOD 45 MIN: CPT | Performed by: ORTHOPAEDIC SURGERY

## 2024-04-29 PROCEDURE — 73564 X-RAY EXAM KNEE 4 OR MORE: CPT

## 2024-04-29 PROCEDURE — 73030 X-RAY EXAM OF SHOULDER: CPT

## 2024-04-29 PROCEDURE — 97112 NEUROMUSCULAR REEDUCATION: CPT | Performed by: PHYSICAL THERAPIST

## 2024-04-29 RX ORDER — VITAMIN B COMPLEX
1 CAPSULE ORAL DAILY
COMMUNITY

## 2024-04-29 RX ORDER — LISINOPRIL 2.5 MG/1
TABLET ORAL
COMMUNITY
Start: 2024-04-16

## 2024-04-29 NOTE — PROGRESS NOTES
"Patient Name:  Poly Youssef  MRN:  [id number]    Assessment & Plan     1. Internal derangement of right shoulder  -     MRI shoulder right wo contrast; Future; Expected date: 04/29/2024    2. Left knee pain, unspecified chronicity    3. Right shoulder pain, unspecified chronicity  -     XR shoulder 2+ vw right; Future; Expected date: 04/29/2024      Right shoulder internal derangement and Left knee pain with mild osteoarthritis  X-rays reviewed and discussed with the patient.   In regards to Left knee, discussed nonoperative management of Left knee osteoarthritis including OTC topical and oral analgesics, outpatient PT, injection therapy including CSI, visco or PRP. If patient's symptoms persist, can consider MRI for evaluation of meniscus pathology and cartilage surfaces. Patient declined CSI today.  In regards to Right shoulder internal derangement, weakness with rotator cuff testing, pain with range of motion, will move forward with MRI to rule out rotator cuff pathology and cartilage surfaces. In the meantime, advised patient she may continue outpatient PT and home exercises to perform passive and active motion to prevent stiffness.   Continue OTC medications as needed for pain relief  Follow up after MRI for discussion of continued nonoperative vs surgical management     Chief Complaint     Right shoulder and Left knee pain    History of the Present Illness     Poly Youssef is a 58 y.o. female with Left knee and Right shoulder pain. In regards to the Left knee, patient admits to previous meniscus tear s/p arthroscopic procedure. She admits she did okay after the procedure. She started to have pain in the posterior aspect of the knee with associated swelling. Patient had prior CSI which provided temporary pain relief. Patient admits \"the baker's cyst causes the most pain\". In regards to the Right shoulder, she admits to pain ongoing for 2 years after pulling up a sheet on her bed. She has been " "performing outpatient PT recently with mild improvement of motion.     Review of Systems     Review of Systems   Constitutional:  Negative for chills and fever.   HENT:  Negative for congestion.    Respiratory:  Negative for cough, chest tightness and shortness of breath.    Cardiovascular:  Negative for chest pain and palpitations.   Gastrointestinal:  Negative for abdominal pain.   Endocrine: Negative for cold intolerance and heat intolerance.   Neurological:  Negative for syncope.   Psychiatric/Behavioral:  Negative for confusion.        Physical Exam     /79   Pulse 90   Ht 5' 6\" (1.676 m)   Wt 71.2 kg (157 lb)   BMI 25.34 kg/m²     Right Shoulder:   Active range of motion   140 degrees forward flexion  140 degrees abduction  60 degrees external rotation   3 level restriction internal rotation    Passive range of motion   150 degrees of forward flexion   There is no tenderness present over the shoulder.   Forward flexion testing 4/5  External rotation testing 5/5  Internal rotation testing 4/5  Torres test is positive  Merritt's test is negative    Speed's test is Negative  The patient is neurovascularly intact distally in the extremity.      Eyes:  Anicteric sclerae.  Neck:  Supple.  Lungs:  Normal respiratory effort.  Cardiovascular:  Capillary refill is less than 2 seconds.  Skin:  Intact without erythema.  Neurologic:  Sensation grossly intact to light touch.  Psychiatric:  Mood and affect are appropriate.    Data Review     I have personally reviewed pertinent films in PACS, and my interpretation follows:    X-rays taken 04/29/2024 of Right shoulder independently reviewed and demonstrate minimal glenohumeral degenerative changes and no acute fracture or dislocation.    X-rays taken 04/29/2024 of Left knee independently reviewed and demonstrate no obvious fracture or dislocation. Minimal medial joint space degenerative changes. Mild patellofemoral degenerative changes.     Past Medical History: "   Diagnosis Date    Allergic     Asthma     Cancer (HCC)     Cardiomyopathy (HCC)     Coronary artery disease     Disease of thyroid gland     Heart disease     Pacemaker     Shingles        Past Surgical History:   Procedure Laterality Date    BREAST SURGERY      CARDIAC SURGERY      INSERT / REPLACE / REMOVE PACEMAKER      KNEE SURGERY      LYMPH NODE BIOPSY         Allergies   Allergen Reactions    Eggs Or Egg-Derived Products - Food Allergy Vomiting     Violent vomiting    Levofloxacin Other (See Comments)     Redness and burning at IV site    Medical Tape Blisters    Shellfish Allergy - Food Allergy Angioedema    Avocado - Food Allergy Rash    Tomato - Food Allergy Rash       Current Outpatient Medications on File Prior to Visit   Medication Sig Dispense Refill    Advair Diskus 250-50 MCG/ACT inhaler INHALE 1 PUFF TWICE A DAY 60 blister 5    albuterol (PROVENTIL HFA,VENTOLIN HFA) 90 mcg/act inhaler Inhale 2 puffs every 6 (six) hours as needed      b complex vitamins capsule Take 1 capsule by mouth daily      carvedilol (COREG) 12.5 mg tablet Take 12.5 mg by mouth 2 (two) times a day      levothyroxine 75 mcg tablet TAKE 1 TABLET DAILY 90 tablet 3    Magnesium 100 MG TABS Take by mouth      montelukast (SINGULAIR) 10 mg tablet TAKE 1 TABLET DAILY AT BEDTIME 90 tablet 3    Multiple Vitamin (MULTI-DAY PO) Take 1 tablet by mouth in the morning      lisinopril (ZESTRIL) 2.5 mg tablet  (Patient not taking: Reported on 4/29/2024)       No current facility-administered medications on file prior to visit.       Social History     Tobacco Use    Smoking status: Never    Smokeless tobacco: Never   Substance Use Topics    Alcohol use: Not Currently    Drug use: Never       Family History   Problem Relation Age of Onset    Heart disease Mother     Cancer Mother     Dementia Mother     Thyroid disease Mother     Heart disease Father     Depression Father     Diabetes Father     Hypertension Brother              Procedures  Performed     Procedures  None       Ilya Kirby DO

## 2024-04-29 NOTE — PROGRESS NOTES
Daily Note     Today's date: 2024  Patient name: Poly Youssef  : 1965  MRN: [id number]  Referring provider: Kellie Stevens CRNP  Dx:   Encounter Diagnosis     ICD-10-CM    1. Acute pain of right shoulder  M25.511           Start Time: 0759  Stop Time: 0845  Total time in clinic (min): 46 minutes    Subjective: Pt reports she has been feeling better using the heavy band to mobilize her shoulder, however, she had increased pain yesterday.       Objective: See treatment diary below      Assessment: Tolerated treatment fair. Patient demonstrated fatigue post treatment, exhibited good technique with therapeutic exercises, and would benefit from continued PT. Pt continues to have difficulty with overhead motion and IR of her R shoulder at today's session. Pt required min verbal cues to facilitate performance of proper technique. Assess pt response to treatment at next visit.      Plan: Continue per plan of care.      Precautions: Cardiac and cancer history, pacemaker    POC expires Unit limit Auth Expiration date PT/OT + Visit Limit?    N/A N/A 120, medical review after 20                 Visit/Unit Tracking  AUTH Status:  Date 4/10 4/15 4/17 4/22 4/24 4/29     4/3 4/8   20 Used 13 14 15 16 17 18     9 12    Remaining  7 6 5 4 3 2     11 8     FOTO, done on 3/13, do NV      Manuals 4/3 4/8 4/10 4/15 4/17 4/22 4/24 4/29     PROM R shoulder             Post/inf/ant GH mobilizations JOE CG JOE CG JOE CG JOE JOE     MWM 90-90 ER   JOE CG JOE CG JOE JOE     SSMP humeral depression     JOE        Shoulder impingement opening mobilization JOE  JOE  JOE        DTM infraspinatus  CG   JOE CG       Neuro Re-Ed             Pt education 5' 5' 5'  10'  10' 5'     [place] rows  3x10 15#  3x10 15#    3x10 15#     Serratus pluses against table 3x10 3x10 3x10 3x10  3x10       Table push ups c slight elevation   2x10 2x10  2x10       Sidelying ER 2x10 3# 2x10 3# 3x10 3# 3x10 3#  3x10 3#  3x10 3#     Repeated standing  extension              Serratus foam roll ups c ER 2x8 blue 2x10 blue 2x10 blue 2x10 grn         [place] extensoins  3x10 10#  3x10 10#    3x10 10#     TB wall liftoff  ylw   2x10           Ther Ex             pullies     2' ea flex/abd 2' ea 2' ea 2' ea     MWM ER c heavy band at rack       30x grn 30x grn     Table shoulder taps   1x15 ea 1x15 ea  1x15 ea       Tband IR 3x10 blue 3x10 blue 3x10 blue 3x10 blue  3x10 blue 3x10 blue 3x10 blue     MWM flexion c heavy band at rack       20x grn 20x grn     Tband ER   3x10 blue 3x10 blue  3x10 blue 3x10 blue 3x10 blue     UBE for UE strength 5' 5' 5' 5' 5' 5' 5' 5'     Tband ER 90-90 2x10 yellow 2x10 ylw 2x10 yellw 2x10 yellow         Ther Activity             90-90 DB presses   3x10 3#                       Gait Training                                       Modalities

## 2024-05-01 ENCOUNTER — OFFICE VISIT (OUTPATIENT)
Dept: PHYSICAL THERAPY | Facility: CLINIC | Age: 59
End: 2024-05-01
Payer: COMMERCIAL

## 2024-05-01 DIAGNOSIS — M25.511 ACUTE PAIN OF RIGHT SHOULDER: Primary | ICD-10-CM

## 2024-05-01 PROCEDURE — 97110 THERAPEUTIC EXERCISES: CPT | Performed by: PHYSICAL THERAPIST

## 2024-05-01 PROCEDURE — 97140 MANUAL THERAPY 1/> REGIONS: CPT | Performed by: PHYSICAL THERAPIST

## 2024-05-01 PROCEDURE — 97112 NEUROMUSCULAR REEDUCATION: CPT | Performed by: PHYSICAL THERAPIST

## 2024-05-01 NOTE — PROGRESS NOTES
Daily Note     Today's date: 2024  Patient name: Poly Youssef  : 1965  MRN: [id number]  Referring provider: Kellie Stevens CRNP  Dx:   Encounter Diagnosis     ICD-10-CM    1. Acute pain of right shoulder  M25.511           Start Time: 0758  Stop Time: 0845  Total time in clinic (min): 47 minutes    Subjective: Pt reports having a constant dull pain in her R shoulder at the start of therapy today.       Objective: See treatment diary below      Assessment: Tolerated treatment well. Patient demonstrated fatigue post treatment, exhibited good technique with therapeutic exercises, and would benefit from continued PT. Continued with MWM using a heavy band focusing on posterior and inferior glides of the pt's R humerus, pt noted a reduction in pain while performing overhead motion. Pt also demonstrated minimal pain with supine tband flexion today. Pt required min verbal cues to facilitate performance of proper technique. Assess pt response to treatment at next visit.      Plan: Continue per plan of care.      Precautions: Cardiac and cancer history, pacemaker    POC expires Unit limit Auth Expiration date PT/OT + Visit Limit?    N/A N/A 120, medical review after 20                 Visit/Unit Tracking  AUTH Status:  Date 4/10 4/15 4/17 4/22 4/24 4/29 5/1    4/3 4/8   20 Used 13 14 15 16 17 18 19    9 12    Remaining  7 6 5 4 3 2 1    11 8     FOTO, done on 3/13, do NV      Manuals 4/3 4/8 4/10 4/15 4/17 4/22 4/24 4/29 4/31    PROM R shoulder             Post/inf/ant GH mobilizations JOE CG JOE CG JOE CG JOE JOE JOE    MWM 90-90 ER   JOE CG JOE CG JOE JOE JOE    SSMP humeral depression     JOE        Shoulder impingement opening mobilization JOE  JOE  JOE        DTM infraspinatus  CG   JOE CG       Neuro Re-Ed             Pt education 5' 5' 5'  10'  10' 5' 5'    [place] rows  3x10 15#  3x10 15#    3x10 15#     Serratus pluses against table 3x10 3x10 3x10 3x10  3x10       Table push ups c slight elevation   2x10  2x10  2x10       Sidelying ER 2x10 3# 2x10 3# 3x10 3# 3x10 3#  3x10 3#  3x10 3#     Supine tband flexion         3x10 grn    Tband punch ups         2x10 grn    [place] extensoins  3x10 10#  3x10 10#    3x10 10#     TB wall liftoff  ylw   2x10           Ther Ex             pullies     2' ea flex/abd 2' ea 2' ea 2' ea     MWM ER c heavy band at rack       30x grn 30x grn 30x grn    Table shoulder taps   1x15 ea 1x15 ea  1x15 ea       Tband IR 3x10 blue 3x10 blue 3x10 blue 3x10 blue  3x10 blue 3x10 blue 3x10 blue 3x10 blue    MWM flexion c heavy band at rack       20x grn 20x grn 30x grn    Tband ER   3x10 blue 3x10 blue  3x10 blue 3x10 blue 3x10 blue 3x10 blue    UBE for UE strength 5' 5' 5' 5' 5' 5' 5' 5' 5'    Tband ER 90-90 2x10 yellow 2x10 ylw 2x10 yellw 2x10 yellow         Ther Activity             90-90 DB presses   3x10 3#                       Gait Training                                       Modalities

## 2024-05-06 ENCOUNTER — OFFICE VISIT (OUTPATIENT)
Dept: PHYSICAL THERAPY | Facility: CLINIC | Age: 59
End: 2024-05-06
Payer: COMMERCIAL

## 2024-05-06 DIAGNOSIS — M25.511 ACUTE PAIN OF RIGHT SHOULDER: Primary | ICD-10-CM

## 2024-05-06 PROCEDURE — 97112 NEUROMUSCULAR REEDUCATION: CPT | Performed by: PHYSICAL THERAPIST

## 2024-05-06 PROCEDURE — 97140 MANUAL THERAPY 1/> REGIONS: CPT | Performed by: PHYSICAL THERAPIST

## 2024-05-06 PROCEDURE — 97110 THERAPEUTIC EXERCISES: CPT | Performed by: PHYSICAL THERAPIST

## 2024-05-06 NOTE — PROGRESS NOTES
Daily Note     Today's date: 2024  Patient name: Poly Youssef  : 1965  MRN: [id number]  Referring provider: Kellie Stevens CRNP  Dx:   Encounter Diagnosis     ICD-10-CM    1. Acute pain of right shoulder  M25.511           Start Time: 0759  Stop Time: 0846  Total time in clinic (min): 47 minutes    Subjective: Pt reports doing better with improved motion of her R shoulder after consistently performing the banded mobilizations at home.      Objective: See treatment diary below      Assessment: Tolerated treatment well. Patient demonstrated fatigue post treatment, exhibited good technique with therapeutic exercises, and would benefit from continued PT. Pt demonstrated improved overhead flexion and abduction, however, limitations and pain persist with IR of her R shoulder. Manual therapy and mobility exercise demonstrated improvements of pt's R shoulder AROM. Pt required min verbal cues to facilitate performance of proper technique. Assess pt response to treatment at next visit.      Plan: Continue per plan of care.      Precautions: Cardiac and cancer history, pacemaker    POC expires Unit limit Auth Expiration date PT/OT + Visit Limit?    N/A N/A 120, medical review after 20                 Visit/Unit Tracking  AUTH Status:  Date 4/10 4/15 4/17 4/22 4/24 4/29 5/1 5/ FOTO    4/3 4/8   20 Used 13 14 15 16 17 18 19 20   9 12    Remaining  7 6 5 4 3 2 1 0   11 8     FOTO, done on       Manuals 4/3 4/8 4/10 4/15 4/17 4/22 4/24 4/29 5/1 5/6   PROM R shoulder             Post/inf/ant GH mobilizations JOE CG JOE CG JOE CG JOE JOE JOE JOE   MWM 90-90 ER   JOE CG JOE CG JOE JOE JOE JOE   SSMP humeral depression     JOE        MWM IR 45 degrees          JOE   DTM infraspinatus  CG   JOE CG       Neuro Re-Ed             Pt education 5' 5' 5'  10'  10' 5' 5' 5'   [place] rows  3x10 15#  3x10 15#    3x10 15#     Serratus pluses against table 3x10 3x10 3x10 3x10  3x10       Table push ups c slight elevation   2x10  2x10  2x10       Sidelying ER 2x10 3# 2x10 3# 3x10 3# 3x10 3#  3x10 3#  3x10 3#  3x10 3#   Supine tband flexion         3x10 grn 3x10 blue   Tband punch ups         2x10 grn 2x10 grn   [place] extensoins  3x10 10#  3x10 10#    3x10 10#     TB wall liftoff  ylw   2x10           Ther Ex             pullies     2' ea flex/abd 2' ea 2' ea 2' ea     MWM ER c heavy band at rack       30x grn 30x grn 30x grn 30x grn   Table shoulder taps   1x15 ea 1x15 ea  1x15 ea       Tband IR 3x10 blue 3x10 blue 3x10 blue 3x10 blue  3x10 blue 3x10 blue 3x10 blue 3x10 blue 3x10 blue   MWM flexion c heavy band at rack       20x grn 20x grn 30x grn 30x grn   Tband ER   3x10 blue 3x10 blue  3x10 blue 3x10 blue 3x10 blue 3x10 blue 3x10 blue   UBE for UE strength 5' 5' 5' 5' 5' 5' 5' 5' 5' 5'   Tband ER 90-90 2x10 yellow 2x10 ylw 2x10 yellw 2x10 yellow         Ther Activity             90-90 DB presses   3x10 3#                       Gait Training                                       Modalities

## 2024-05-08 ENCOUNTER — OFFICE VISIT (OUTPATIENT)
Dept: PHYSICAL THERAPY | Facility: CLINIC | Age: 59
End: 2024-05-08
Payer: COMMERCIAL

## 2024-05-08 ENCOUNTER — HOSPITAL ENCOUNTER (OUTPATIENT)
Dept: MRI IMAGING | Facility: HOSPITAL | Age: 59
Discharge: HOME | End: 2024-05-08
Payer: COMMERCIAL

## 2024-05-08 DIAGNOSIS — M25.511 ACUTE PAIN OF RIGHT SHOULDER: Primary | ICD-10-CM

## 2024-05-08 DIAGNOSIS — M24.811 INTERNAL DERANGEMENT OF RIGHT SHOULDER: ICD-10-CM

## 2024-05-08 PROCEDURE — 97110 THERAPEUTIC EXERCISES: CPT

## 2024-05-08 PROCEDURE — 97112 NEUROMUSCULAR REEDUCATION: CPT

## 2024-05-08 PROCEDURE — 73221 MRI JOINT UPR EXTREM W/O DYE: CPT

## 2024-05-08 PROCEDURE — 97140 MANUAL THERAPY 1/> REGIONS: CPT

## 2024-05-08 NOTE — NURSING NOTE
Device programmed for MRI. Patient continuously monitored by Radiology RN. Patient tolerated well. Device reprogrammed to prior settings. Vital signs stable throughout. No complaints per patient.

## 2024-05-08 NOTE — PROGRESS NOTES
Daily Note     Today's date: 2024  Patient name: Poly Youssef  : 1965  MRN: [id number]  Referring provider: Kellie Stevens CRNP  Dx:   Encounter Diagnosis     ICD-10-CM    1. Acute pain of right shoulder  M25.511           Start Time: 0800  Stop Time: 0845  Total time in clinic (min): 45 minutes    Subjective: Pt reports she was very sore after her last session and kept ice on it all day yesterday. She goes for MRI this afternoon.       Objective: See treatment diary below      Assessment: Tolerated treatment well. Patient demonstrated fatigue post treatment, exhibited good technique with therapeutic exercises, and would benefit from continued PT. Continued with interventions as noted to improve functional strength and mobility with slight reductions in pain noted following manual interventions. She continues to be challenged by strengthening exercises this session but notes only fatigue post tx.       Plan: Continue per plan of care.      Precautions: Cardiac and cancer history, pacemaker    POC expires Unit limit Auth Expiration date PT/OT + Visit Limit?    N/A N/A 120, medical review after 20                 Visit/Unit Tracking  AUTH Status:  Date 4/10 4/15 4/17 4/22 4/24 4/29 5/1 5 FOTO  5/8  4/3 4/8   20 Used 13 14 15 16 17 18 19 20 21  9 12    Remaining  7 6 5 4 3 2 1 0   11 8     FOTO, done on       Manuals 5/8  4/10 4/15 4/17 4/22 4/24 4/29 5/1 56   PROM R shoulder             Post/inf/ant GH mobilizations CG  JOE CG JOE CG JOE JOE JOE JOE   MWM 90-90 ER CG  JOE CG JOE CG JOE JOE JOE JOE   SSMP humeral depression     JOE        MWM IR 45 degrees CG         JOE   DTM infraspinatus     JOE CG       IR contract relax CG            Neuro Re-Ed             Pt education   5'  10'  10' 5' 5' 5'   [place] rows    3x10 15#    3x10 15#     Serratus pluses against table   3x10 3x10  3x10       Table push ups c slight elevation   2x10 2x10  2x10       Sidelying ER 3x10 3#  3x10 3# 3x10 3#  3x10 3#   3x10 3#  3x10 3#   Supine tband flexion         3x10 grn 3x10 blue   Tband punch ups 2x10 grn        2x10 grn 2x10 grn   Gely extensoins    3x10 10#    3x10 10#     TB wall liftoff             Ther Ex             pullies     2' ea flex/abd 2' ea 2' ea 2' ea     MWM ER c heavy band at rack 30x grn      30x grn 30x grn 30x grn 30x grn   Table shoulder taps   1x15 ea 1x15 ea  1x15 ea       Tband IR 3x10 blue  3x10 blue 3x10 blue  3x10 blue 3x10 blue 3x10 blue 3x10 blue 3x10 blue   MWM flexion c heavy band at rack 30x grn      20x grn 20x grn 30x grn 30x grn   Tband ER 3x10 blue  3x10 blue 3x10 blue  3x10 blue 3x10 blue 3x10 blue 3x10 blue 3x10 blue   UBE for UE strength 5'  5' 5' 5' 5' 5' 5' 5' 5'   Tband ER 90-90   2x10 yellw 2x10 yellow         Ther Activity             90-90 DB presses   3x10 3#                       Gait Training                                       Modalities

## 2024-05-13 ENCOUNTER — OFFICE VISIT (OUTPATIENT)
Dept: PHYSICAL THERAPY | Facility: CLINIC | Age: 59
End: 2024-05-13
Payer: COMMERCIAL

## 2024-05-13 DIAGNOSIS — M25.511 ACUTE PAIN OF RIGHT SHOULDER: Primary | ICD-10-CM

## 2024-05-13 PROCEDURE — 97112 NEUROMUSCULAR REEDUCATION: CPT | Performed by: PHYSICAL THERAPIST

## 2024-05-13 PROCEDURE — 97140 MANUAL THERAPY 1/> REGIONS: CPT | Performed by: PHYSICAL THERAPIST

## 2024-05-13 PROCEDURE — 97110 THERAPEUTIC EXERCISES: CPT | Performed by: PHYSICAL THERAPIST

## 2024-05-13 NOTE — PROGRESS NOTES
"Daily Note     Today's date: 2024  Patient name: Poly Youssef  : 1965  MRN: [id number]  Referring provider: Kellie Stevens CRNP  Dx:   Encounter Diagnosis     ICD-10-CM    1. Acute pain of right shoulder  M25.511           Start Time: 0800  Stop Time: 0848  Total time in clinic (min): 48 minutes    Subjective: Pt reports she continues to have improvements in her motion, however, restrictions behind the back with pain remain.       Objective: See treatment diary below      Assessment: Tolerated treatment well. Patient demonstrated fatigue post treatment, exhibited good technique with therapeutic exercises, and would benefit from continued PT. Pt was able to progress with inclusion of supine wall angels and tband IR at 90 degrees flexion into pt's exercise program. Pt required min verbal cues to facilitate performance of proper technique. Assess pt response to treatment at next visit.      Plan: Continue per plan of care.      Precautions: Cardiac and cancer history, pacemaker    POC expires Unit limit Auth Expiration date PT/OT + Visit Limit?    N/A N/A 120, medical review after 20                 Visit/Unit Tracking  AUTH Status:  Date 4/10 4/15 4/17 4/22 4/24 4/29 5/1 5/6 FOTO       20 Used 13 14 15 16 17 18 19 20 21 22      Remaining  7 6 5 4 3 2 1 0         FOTO, done on       Manuals  5   PROM R shoulder             Post/inf/ant GH mobilizations CG JOE      JOE JOE JOE   MWM 90-90 ER CG JOE      JOE JOE JOE   SSMP humeral depression             MWM IR 45 degrees CG JOE        JOE   DTM infraspinatus             IR contract relax CG            Neuro Re-Ed             Pt education  4'      5' 5' 5'   [place] rows        3x10 15#     Supine wall angels c half roll  20x 5\"           Table push ups c slight elevation             Sidelying ER 3x10 3# 3x10 3#      3x10 3#  3x10 3#   Supine tband flexion         3x10 grn 3x10 blue   Tband punch ups 2x10 grn 2x10 grn "       2x10 grn 2x10 grn   [place] extensoins        3x10 10#     TB wall liftoff             Ther Ex             pullies        2' ea     MWM ER c heavy band at rack 30x grn 30x grn      30x grn 30x grn 30x grn   Tband IR at 90 degrees flexion  3x10 grn           Tband IR 3x10 blue 3x10 blue      3x10 blue 3x10 blue 3x10 blue   MWM flexion c heavy band at rack 30x grn 30x grn      20x grn 30x grn 30x grn   Tband ER 3x10 blue 3x10 blue      3x10 blue 3x10 blue 3x10 blue   UBE for UE strength 5' 5'      5' 5' 5'   Tband ER 90-90             Ther Activity             90-90 DB presses                          Gait Training                                       Modalities

## 2024-05-15 ENCOUNTER — APPOINTMENT (OUTPATIENT)
Dept: PHYSICAL THERAPY | Facility: CLINIC | Age: 59
End: 2024-05-15
Payer: COMMERCIAL

## 2024-05-20 ENCOUNTER — OFFICE VISIT (OUTPATIENT)
Dept: OBGYN CLINIC | Facility: CLINIC | Age: 59
End: 2024-05-20
Payer: COMMERCIAL

## 2024-05-20 ENCOUNTER — OFFICE VISIT (OUTPATIENT)
Dept: PHYSICAL THERAPY | Facility: CLINIC | Age: 59
End: 2024-05-20
Payer: COMMERCIAL

## 2024-05-20 VITALS
HEART RATE: 81 BPM | SYSTOLIC BLOOD PRESSURE: 122 MMHG | BODY MASS INDEX: 25.23 KG/M2 | HEIGHT: 66 IN | DIASTOLIC BLOOD PRESSURE: 76 MMHG | WEIGHT: 157 LBS

## 2024-05-20 DIAGNOSIS — M25.511 ACUTE PAIN OF RIGHT SHOULDER: Primary | ICD-10-CM

## 2024-05-20 DIAGNOSIS — M25.562 LEFT KNEE PAIN, UNSPECIFIED CHRONICITY: ICD-10-CM

## 2024-05-20 DIAGNOSIS — M17.12 PRIMARY OSTEOARTHRITIS OF LEFT KNEE: ICD-10-CM

## 2024-05-20 DIAGNOSIS — M75.81 ROTATOR CUFF TENDONITIS, RIGHT: Primary | ICD-10-CM

## 2024-05-20 DIAGNOSIS — M25.511 RIGHT SHOULDER PAIN, UNSPECIFIED CHRONICITY: ICD-10-CM

## 2024-05-20 PROCEDURE — 20610 DRAIN/INJ JOINT/BURSA W/O US: CPT | Performed by: ORTHOPAEDIC SURGERY

## 2024-05-20 PROCEDURE — 99214 OFFICE O/P EST MOD 30 MIN: CPT | Performed by: ORTHOPAEDIC SURGERY

## 2024-05-20 PROCEDURE — 97140 MANUAL THERAPY 1/> REGIONS: CPT | Performed by: PHYSICAL THERAPIST

## 2024-05-20 PROCEDURE — 97110 THERAPEUTIC EXERCISES: CPT | Performed by: PHYSICAL THERAPIST

## 2024-05-20 PROCEDURE — 97112 NEUROMUSCULAR REEDUCATION: CPT | Performed by: PHYSICAL THERAPIST

## 2024-05-20 RX ORDER — LIDOCAINE HYDROCHLORIDE 10 MG/ML
2 INJECTION, SOLUTION INFILTRATION; PERINEURAL
Status: COMPLETED | OUTPATIENT
Start: 2024-05-20 | End: 2024-05-20

## 2024-05-20 RX ORDER — METHYLPREDNISOLONE ACETATE 40 MG/ML
2 INJECTION, SUSPENSION INTRA-ARTICULAR; INTRALESIONAL; INTRAMUSCULAR; SOFT TISSUE
Status: COMPLETED | OUTPATIENT
Start: 2024-05-20 | End: 2024-05-20

## 2024-05-20 RX ORDER — BUPIVACAINE HYDROCHLORIDE 2.5 MG/ML
2 INJECTION, SOLUTION INFILTRATION; PERINEURAL
Status: COMPLETED | OUTPATIENT
Start: 2024-05-20 | End: 2024-05-20

## 2024-05-20 RX ADMIN — BUPIVACAINE HYDROCHLORIDE 2 ML: 2.5 INJECTION, SOLUTION INFILTRATION; PERINEURAL at 11:30

## 2024-05-20 RX ADMIN — LIDOCAINE HYDROCHLORIDE 2 ML: 10 INJECTION, SOLUTION INFILTRATION; PERINEURAL at 11:30

## 2024-05-20 RX ADMIN — METHYLPREDNISOLONE ACETATE 2 ML: 40 INJECTION, SUSPENSION INTRA-ARTICULAR; INTRALESIONAL; INTRAMUSCULAR; SOFT TISSUE at 11:30

## 2024-05-20 NOTE — PROGRESS NOTES
Patient Name:  Poly Youssef  MRN:  [id number]    Assessment & Plan     1. Rotator cuff tendonitis, right  -     Large joint arthrocentesis: R subacromial bursa  2. Primary osteoarthritis of left knee  -     Large joint arthrocentesis: L knee  3. Left knee pain, unspecified chronicity  -     Large joint arthrocentesis: L knee  4. Right shoulder pain, unspecified chronicity  -     Large joint arthrocentesis: R subacromial bursa    Right shoulder rotator cuff tendonitis and mild Left knee osteoarthritis with history of partial meniscectomy  MRI of Right shoulder and x-rays of Left knee reviewed in office with patient  In regards to the Right shoulder, recommended nonoperative management by means of continued outpatient PT to work on motion, postural strengthening and proper scapulothoracic mechanics with motion. Offered and accepted Right subacromial CSI. Risks discussed and tolerated well.   In regards to the Left knee, discussed nonoperative management including topical and oral analgesics, neoprene sleeve for bracing, and CSI. Accepted CSI. Risks discussed. Tolerated procedure well.   If Left knee pain persists or worsens, can consider further imaging.   Follow up as needed     History of the Present Illness   Poly Youssef is a 58 y.o. female with Right shoulder internal derangement. Today, patient reports she has been feeling improved from previous appointment with attending outpatient PT. She admits her range of motion has improved, but still has some pain with certain internal rotation motions. She has residual discomfort at the lateral and posterior aspects of the shoulder. In regards to the Left knee, she admits to persistent pain with ambulation and increased functions. She again reports Left knee arthroscopy in 2008 for menisectomy. She locates most of her pain to the medial aspect of the knee. She reports a Bakers cyst in the back of her knee.           Review of Systems     Review of  "Systems   Constitutional:  Negative for chills and fever.   HENT:  Negative for congestion.    Respiratory:  Negative for cough, chest tightness and shortness of breath.    Cardiovascular:  Negative for chest pain and palpitations.   Gastrointestinal:  Negative for abdominal pain.   Endocrine: Negative for cold intolerance and heat intolerance.   Neurological:  Negative for syncope.   Psychiatric/Behavioral:  Negative for confusion.        Physical Exam     /76   Pulse 81   Ht 5' 6\" (1.676 m)   Wt 71.2 kg (157 lb)   BMI 25.34 kg/m²     Right Shoulder:   Active range of motion   150 degrees forward flexion  170 degrees abduction  60 degrees external rotation   L4 internal rotation    There is no tenderness present over the shoulder.   Forward flexion testing 4+/5 and improving  External rotation testing 5/5  Internal rotation testing 4+/5 and improving  Torres test is negative   Topton's test is negative    Speed's test is Negative  The patient is neurovascularly intact distally in the extremity.      Data Review     I have personally reviewed pertinent films in PACS, and my interpretation follows.    X-rays taken 04/29/2024 of Right shoulder demonstrate minimal glenohumeral degenerative changes and no acute fracture or dislocation.     MRI performed 05/08/2024 of Right shoulder demonstrates mild supraspinatus and subscapularis tendinosis without tear or atrophy noted.     Social History     Tobacco Use    Smoking status: Never    Smokeless tobacco: Never   Substance Use Topics    Alcohol use: Not Currently    Drug use: Never           Large joint arthrocentesis: L knee  Universal Protocol:  Risks and benefits: risks, benefits and alternatives were discussed  Consent given by: patient  Patient identity confirmed: verbally with patient  Procedure Details  Location: knee - L knee  Needle size: 22 G  Approach: lateral  Medications administered: 2 mL bupivacaine 0.25 %; 2 mL lidocaine 1 %; 2 mL " methylPREDNISolone acetate 40 mg/mL    Patient tolerance: patient tolerated the procedure well with no immediate complications  Dressing:  Sterile dressing applied      Large joint arthrocentesis: R subacromial bursa  Universal Protocol:  Risks and benefits: risks, benefits and alternatives were discussed  Consent given by: patient  Patient identity confirmed: verbally with patient  Supporting Documentation  Indications: pain   Procedure Details  Location: shoulder - R subacromial bursa  Needle size: 22 G  Approach: lateral  Medications administered: 2 mL bupivacaine 0.25 %; 2 mL lidocaine 1 %; 2 mL methylPREDNISolone acetate 40 mg/mL    Patient tolerance: patient tolerated the procedure well with no immediate complications  Dressing:  Sterile dressing applied            Ilya Kirby DO

## 2024-05-20 NOTE — PROGRESS NOTES
"Daily Note     Today's date: 2024  Patient name: Poly Youssef  : 1965  MRN: [id number]  Referring provider: Kellie Stevens CRNP  Dx:   Encounter Diagnosis     ICD-10-CM    1. Acute pain of right shoulder  M25.511           Start Time: 0800  Stop Time: 0845  Total time in clinic (min): 45 minutes    Subjective: Pt reports she continues to have improvements in motion of her R shoulder since her last therapy session.       Objective: See treatment diary below      Assessment: Tolerated treatment well. Patient demonstrated fatigue post treatment, exhibited good technique with therapeutic exercises, and would benefit from continued PT. Pt was able to demonstrate improved IR of her R shoulder after performance of MWM IR c heavy band at today's session. Pt required min verbal cues to facilitate performance of proper technique. Assess pt response to treatment at next visit.      Plan: Continue per plan of care.      Precautions: Cardiac and cancer history, pacemaker    POC expires Unit limit Auth Expiration date PT/OT + Visit Limit?    N/A N/A 120, medical review after 20                 Visit/Unit Tracking  AUTH Status:  Date 4/10 4/15 4/17 4/22 4/24 4/29 5/1 5/6 FOTO       20 Used 13 14 15 16 17 18 19 20 21 22      Remaining  7 6 5 4 3 2 1 0         FOTO, done on       Manuals  5   PROM R shoulder             Post/inf/ant GH mobilizations CG JOE JOE     JOE JOE JOE   MWM 90-90 ER CG JOE JOE     JOE JOE JOE   SSMP humeral depression             MWM IR 45 degrees CG JOE JOE       JOE   DTM infraspinatus             IR contract relax CG            Neuro Re-Ed             Pt education  4' 4'     5' 5' 5'   [place] rows        3x10 15#     Supine wall angels c half roll  20x 5\" 20x 5\"          Table push ups c slight elevation             Sidelying ER 3x10 3# 3x10 3# 3x10 3#     3x10 3#  3x10 3#   Supine tband flexion   3x10 blue      3x10 grn 3x10 blue   Tband punch ups " 2x10 grn 2x10 grn 2x10 red      2x10 grn 2x10 grn   [place] extensoins        3x10 10#     TB wall liftoff             Ther Ex             MWM IR c heavy band at rack   30x grn     2' ea     MWM ER c heavy band at rack 30x grn 30x grn 30x grn     30x grn 30x grn 30x grn   Tband IR at 90 degrees flexion  3x10 grn NV          Tband IR 3x10 blue 3x10 blue 3x10 blue     3x10 blue 3x10 blue 3x10 blue   MWM flexion c heavy band at rack 30x grn 30x grn 30x grn     20x grn 30x grn 30x grn   Tband ER 3x10 blue 3x10 blue 3x10 blue     3x10 blue 3x10 blue 3x10 blue   UBE for UE strength 5' 5' 5'     5' 5' 5'   Tband ER 90-90             Ther Activity             90-90 DB presses                          Gait Training                                       Modalities

## 2024-05-22 ENCOUNTER — OFFICE VISIT (OUTPATIENT)
Dept: PHYSICAL THERAPY | Facility: CLINIC | Age: 59
End: 2024-05-22
Payer: COMMERCIAL

## 2024-05-22 DIAGNOSIS — M25.511 ACUTE PAIN OF RIGHT SHOULDER: Primary | ICD-10-CM

## 2024-05-22 PROCEDURE — 97112 NEUROMUSCULAR REEDUCATION: CPT

## 2024-05-22 PROCEDURE — 97140 MANUAL THERAPY 1/> REGIONS: CPT

## 2024-05-22 PROCEDURE — 97110 THERAPEUTIC EXERCISES: CPT

## 2024-05-22 NOTE — PROGRESS NOTES
"Daily Note     Today's date: 2024  Patient name: Poly Youssef  : 1965  MRN: [id number]  Referring provider: Kellie Stevens CRNP  Dx:   Encounter Diagnosis     ICD-10-CM    1. Acute pain of right shoulder  M25.511           Start Time: 0800  Stop Time: 0845  Total time in clinic (min): 45 minutes    Subjective: Pt reports she got an injection and is having a lot less pain.       Objective: See treatment diary below      Assessment: Tolerated treatment well. Patient demonstrated fatigue post treatment, exhibited good technique with therapeutic exercises, and would benefit from continued PT. Pt is able to progress through program with improved tolerance to mobility interventions secondary to decreased pain. She continues to be most challenged by IR behind back but was able to perform IR stretch with strap this session with only mild discomfort.      Plan: Progress treatment as tolerated.       Precautions: Cardiac and cancer history, pacemaker    POC expires Unit limit Auth Expiration date PT/OT + Visit Limit?    N/A N/A 120, medical review after 20                 Visit/Unit Tracking  AUTH Status:  Date 4/10 4/15 4/17 4/22 4/24 4/29 5/1 5/6 FOTO      20 Used 13 14 15 16 17 18 19 20 21 22 23     Remaining  7 6 5 4 3 2 1 0         FOTO, done on       Manuals  5   PROM R shoulder             Post/inf/ant GH mobilizations CG JOE JOE CG    JOE JOE JOE   MWM 90-90 ER CG JOE JEO CG    JOE JOE JOE   SSMP humeral depression             MWM IR 45 degrees CG JOE JOE CG      JOE   DTM infraspinatus             IR contract relax CG            Neuro Re-Ed             Pt education  4' 4'     5' 5' 5'   [place] rows        3x10 15#     Supine wall angels c half roll  20x 5\" 20x 5\" 20x5\"         Table push ups c slight elevation             Sidelying ER 3x10 3# 3x10 3# 3x10 3# 3x10 3#    3x10 3#  3x10 3#   Supine tband flexion   3x10 blue 3x10 blue     3x10 grn 3x10 blue " "  Tband punch ups 2x10 grn 2x10 grn 2x10 red 2x10 red     2x10 grn 2x10 grn   [place] extensoins        3x10 10#     TB wall liftoff             Ther Ex             MWM IR c heavy band at rack   30x grn 30x grn    2' ea     MWM ER c heavy band at rack 30x grn 30x grn 30x grn 30x grn    30x grn 30x grn 30x grn   Tband IR at 90 degrees flexion  3x10 grn NV          Tband IR 3x10 blue 3x10 blue 3x10 blue 3x10 blue    3x10 blue 3x10 blue 3x10 blue   MWM flexion c heavy band at rack 30x grn 30x grn 30x grn 30x grn    20x grn 30x grn 30x grn   Tband ER 3x10 blue 3x10 blue 3x10 blue 3x10 blue    3x10 blue 3x10 blue 3x10 blue   UBE for UE strength 5' 5' 5' 5'    5' 5' 5'   Tband ER 90-90             IR str c strap    10x10\"         Ther Activity             90-90 DB presses                          Gait Training                                       Modalities                                                              "

## 2024-05-24 DIAGNOSIS — J45.20 MILD INTERMITTENT ASTHMA, UNSPECIFIED WHETHER COMPLICATED: ICD-10-CM

## 2024-05-24 RX ORDER — FLUTICASONE PROPIONATE AND SALMETEROL 250; 50 UG/1; UG/1
1 POWDER RESPIRATORY (INHALATION) 2 TIMES DAILY
Qty: 60 BLISTER | Refills: 5 | Status: SHIPPED | OUTPATIENT
Start: 2024-05-24 | End: 2024-05-28 | Stop reason: SDUPTHER

## 2024-05-24 NOTE — TELEPHONE ENCOUNTER
Medication: Advair 250-50mcg    Dose/Frequency: 1 puff daily    Quantity: 60 blister Refills 5    Pharmacy: [facility]    Office:   [x] PCP/Provider -   [] Speciality/Provider -     Does the patient have enough for 3 days?   [x] Yes   [] No - Send as HP to POD

## 2024-05-28 DIAGNOSIS — J45.20 MILD INTERMITTENT ASTHMA, UNSPECIFIED WHETHER COMPLICATED: ICD-10-CM

## 2024-05-28 RX ORDER — FLUTICASONE PROPIONATE AND SALMETEROL 250; 50 UG/1; UG/1
1 POWDER RESPIRATORY (INHALATION) 2 TIMES DAILY
Qty: 90 BLISTER | Refills: 3 | Status: SHIPPED | OUTPATIENT
Start: 2024-05-28 | End: 2024-06-04 | Stop reason: SDUPTHER

## 2024-05-29 ENCOUNTER — OFFICE VISIT (OUTPATIENT)
Dept: PHYSICAL THERAPY | Facility: CLINIC | Age: 59
End: 2024-05-29
Payer: COMMERCIAL

## 2024-05-29 DIAGNOSIS — M25.511 ACUTE PAIN OF RIGHT SHOULDER: Primary | ICD-10-CM

## 2024-05-29 PROCEDURE — 97112 NEUROMUSCULAR REEDUCATION: CPT

## 2024-05-29 PROCEDURE — 97140 MANUAL THERAPY 1/> REGIONS: CPT

## 2024-05-29 PROCEDURE — 97110 THERAPEUTIC EXERCISES: CPT

## 2024-05-29 NOTE — PROGRESS NOTES
5/29/24 - Pt will be discharged at this time secondary to returning to her prior level of function

## 2024-05-29 NOTE — PROGRESS NOTES
"Daily Note     Today's date: 2024  Patient name: Poly Youssef  : 1965  MRN: [id number]  Referring provider: Kellie Stevens CRNP  Dx:   Encounter Diagnosis     ICD-10-CM    1. Acute pain of right shoulder  M25.511           Start Time: 0800  Stop Time: 0840  Total time in clinic (min): 40 minutes    Subjective: Pt reports her IR is getting a lot better. She wants today to be her last day.       Objective: See treatment diary below      Assessment: Tolerated treatment well. Pt demonstrates good effort throughout session. Pt was provided with updated HEP this visit. She demonstrates good recall of current exercise program. No complaints verbalized during session. Patient demonstrated fatigue post treatment and exhibited good technique with therapeutic exercises      Plan: Continue per plan of care.      Precautions: Cardiac and cancer history, pacemaker    POC expires Unit limit Auth Expiration date PT/OT + Visit Limit?    N/A N/A 120, medical review after 20                 Visit/Unit Tracking  AUTH Status:  Date 4/10 4/15 4/17 4/22 4/24 4/29 5/1 5/6 FOTO     20 Used 13 14 15 16 17 18 19 20 21 22 23 24    Remaining  7 6 5 4 3 2 1 0         FOTO, done on       Manuals  5   PROM R shoulder             Post/inf/ant GH mobilizations CG JEO JOE CG JOE   JOE JOE JOE   MWM 90-90 ER CG JOE JOE CG JOE   JOE JOE JOE   SSMP humeral depression             MWM IR 45 degrees CG JOE JOE CG JOE     JOE   DTM infraspinatus             IR contract relax CG            Neuro Re-Ed             Pt education  4' 4'     5' 5' 5'   [place] rows        3x10 15#     Supine wall angels c half roll  20x 5\" 20x 5\" 20x5\" 20x5\"        Table push ups c slight elevation             Sidelying ER 3x10 3# 3x10 3# 3x10 3# 3x10 3# 3x10 3#   3x10 3#  3x10 3#   Supine tband flexion   3x10 blue 3x10 blue 3x10 blue    3x10 grn 3x10 blue   Tband punch ups 2x10 grn 2x10 grn 2x10 red 2x10 red " "2x10 red    2x10 grn 2x10 grn   [place] extensoins        3x10 10#     TB wall liftoff             Ther Ex             MWM IR c heavy band at rack   30x grn 30x grn 30x grn   2' ea     MWM ER c heavy band at rack 30x grn 30x grn 30x grn 30x grn 30x grn   30x grn 30x grn 30x grn   Tband IR at 90 degrees flexion  3x10 grn NV          Tband IR 3x10 blue 3x10 blue 3x10 blue 3x10 blue 3x10 blue   3x10 blue 3x10 blue 3x10 blue   MWM flexion c heavy band at rack 30x grn 30x grn 30x grn 30x grn 30x grn   20x grn 30x grn 30x grn   Tband ER 3x10 blue 3x10 blue 3x10 blue 3x10 blue 3x10 blue   3x10 blue 3x10 blue 3x10 blue   UBE for UE strength 5' 5' 5' 5' 5'   5' 5' 5'   Lounger ER stretch     10\"x10        IR str c strap    10x10\" 10\"x10        Ther Activity             90-90 DB presses                          Gait Training                                       Modalities                                                                "

## 2024-06-04 DIAGNOSIS — J45.20 MILD INTERMITTENT ASTHMA, UNSPECIFIED WHETHER COMPLICATED: ICD-10-CM

## 2024-06-04 RX ORDER — FLUTICASONE PROPIONATE AND SALMETEROL 250; 50 UG/1; UG/1
1 POWDER RESPIRATORY (INHALATION) 2 TIMES DAILY
Qty: 180 BLISTER | Refills: 3 | Status: SHIPPED | OUTPATIENT
Start: 2024-06-04

## 2024-06-12 ENCOUNTER — TELEPHONE (OUTPATIENT)
Age: 59
End: 2024-06-12

## 2024-06-12 DIAGNOSIS — U07.1 COVID-19: Primary | ICD-10-CM

## 2024-06-12 RX ORDER — NIRMATRELVIR AND RITONAVIR 300-100 MG
3 KIT ORAL 2 TIMES DAILY
Qty: 30 TABLET | Refills: 0 | Status: SHIPPED | OUTPATIENT
Start: 2024-06-12 | End: 2024-06-17

## 2024-06-12 NOTE — TELEPHONE ENCOUNTER
Paxlovid sent to [facility] in [place].  Please notify her that some adverse effects of Paxlovid can be diarrhea or metallic taste in her mouth.  Please have her complete the full 5 days of treatment.  Quarantine guidelines are days 0 through 5 at home, day 6 through 10 in public with a mask on.

## 2024-06-12 NOTE — TELEPHONE ENCOUNTER
I just ran the medication interaction check, and Paxlovid is contraindicated with Advair.  Does she want to start the Paxlovid and stop the Advair in the meantime?

## 2024-06-24 ENCOUNTER — TELEPHONE (OUTPATIENT)
Age: 59
End: 2024-06-24

## 2024-06-24 ENCOUNTER — APPOINTMENT (EMERGENCY)
Dept: CT IMAGING | Facility: HOSPITAL | Age: 59
End: 2024-06-24
Payer: COMMERCIAL

## 2024-06-24 ENCOUNTER — HOSPITAL ENCOUNTER (EMERGENCY)
Facility: HOSPITAL | Age: 59
Discharge: HOME | End: 2024-06-24
Attending: EMERGENCY MEDICINE
Payer: COMMERCIAL

## 2024-06-24 VITALS
OXYGEN SATURATION: 99 % | BODY MASS INDEX: 25.12 KG/M2 | DIASTOLIC BLOOD PRESSURE: 61 MMHG | TEMPERATURE: 98.4 F | WEIGHT: 155.65 LBS | RESPIRATION RATE: 16 BRPM | HEART RATE: 84 BPM | SYSTOLIC BLOOD PRESSURE: 128 MMHG

## 2024-06-24 DIAGNOSIS — R06.00 DYSPNEA: Primary | ICD-10-CM

## 2024-06-24 LAB
2HR DELTA HS TROPONIN: 0 NG/L
ANION GAP SERPL CALCULATED.3IONS-SCNC: 4 MMOL/L (ref 4–13)
ATRIAL RATE: 81 BPM
BASOPHILS # BLD AUTO: 0.05 THOUSANDS/ÂΜL (ref 0–0.1)
BASOPHILS NFR BLD AUTO: 1 % (ref 0–1)
BNP SERPL-MCNC: 273 PG/ML (ref 0–100)
BUN SERPL-MCNC: 11 MG/DL (ref 5–25)
CALCIUM SERPL-MCNC: 9.1 MG/DL (ref 8.4–10.2)
CARDIAC TROPONIN I PNL SERPL HS: 6 NG/L
CARDIAC TROPONIN I PNL SERPL HS: 6 NG/L
CHLORIDE SERPL-SCNC: 107 MMOL/L (ref 96–108)
CK SERPL-CCNC: 39 U/L (ref 26–192)
CO2 SERPL-SCNC: 29 MMOL/L (ref 21–32)
CREAT SERPL-MCNC: 0.69 MG/DL (ref 0.6–1.3)
EOSINOPHIL # BLD AUTO: 0.14 THOUSAND/ÂΜL (ref 0–0.61)
EOSINOPHIL NFR BLD AUTO: 2 % (ref 0–6)
ERYTHROCYTE [DISTWIDTH] IN BLOOD BY AUTOMATED COUNT: 13.3 % (ref 11.6–15.1)
GFR SERPL CREATININE-BSD FRML MDRD: 96 ML/MIN/1.73SQ M
GLUCOSE SERPL-MCNC: 95 MG/DL (ref 65–140)
HCT VFR BLD AUTO: 37.7 % (ref 34.8–46.1)
HGB BLD-MCNC: 12.3 G/DL (ref 11.5–15.4)
IMM GRANULOCYTES # BLD AUTO: 0.01 THOUSAND/UL (ref 0–0.2)
IMM GRANULOCYTES NFR BLD AUTO: 0 % (ref 0–2)
LYMPHOCYTES # BLD AUTO: 1.62 THOUSANDS/ÂΜL (ref 0.6–4.47)
LYMPHOCYTES NFR BLD AUTO: 24 % (ref 14–44)
MAGNESIUM SERPL-MCNC: 2 MG/DL (ref 1.9–2.7)
MCH RBC QN AUTO: 31.3 PG (ref 26.8–34.3)
MCHC RBC AUTO-ENTMCNC: 32.6 G/DL (ref 31.4–37.4)
MCV RBC AUTO: 96 FL (ref 82–98)
MONOCYTES # BLD AUTO: 1.12 THOUSAND/ÂΜL (ref 0.17–1.22)
MONOCYTES NFR BLD AUTO: 17 % (ref 4–12)
NEUTROPHILS # BLD AUTO: 3.86 THOUSANDS/ÂΜL (ref 1.85–7.62)
NEUTS SEG NFR BLD AUTO: 56 % (ref 43–75)
NRBC BLD AUTO-RTO: 0 /100 WBCS
P AXIS: 85 DEGREES
PLATELET # BLD AUTO: 338 THOUSANDS/UL (ref 149–390)
PMV BLD AUTO: 10.1 FL (ref 8.9–12.7)
POTASSIUM SERPL-SCNC: 4.1 MMOL/L (ref 3.5–5.3)
PR INTERVAL: 210 MS
QRS AXIS: 2 DEGREES
QRSD INTERVAL: 100 MS
QT INTERVAL: 398 MS
QTC INTERVAL: 462 MS
RBC # BLD AUTO: 3.93 MILLION/UL (ref 3.81–5.12)
SODIUM SERPL-SCNC: 140 MMOL/L (ref 135–147)
T WAVE AXIS: 60 DEGREES
VENTRICULAR RATE: 81 BPM
WBC # BLD AUTO: 6.8 THOUSAND/UL (ref 4.31–10.16)

## 2024-06-24 PROCEDURE — 36415 COLL VENOUS BLD VENIPUNCTURE: CPT | Performed by: EMERGENCY MEDICINE

## 2024-06-24 PROCEDURE — 84484 ASSAY OF TROPONIN QUANT: CPT | Performed by: EMERGENCY MEDICINE

## 2024-06-24 PROCEDURE — 85025 COMPLETE CBC W/AUTO DIFF WBC: CPT | Performed by: EMERGENCY MEDICINE

## 2024-06-24 PROCEDURE — 99285 EMERGENCY DEPT VISIT HI MDM: CPT | Performed by: EMERGENCY MEDICINE

## 2024-06-24 PROCEDURE — 93010 ELECTROCARDIOGRAM REPORT: CPT | Performed by: INTERNAL MEDICINE

## 2024-06-24 PROCEDURE — 82550 ASSAY OF CK (CPK): CPT | Performed by: EMERGENCY MEDICINE

## 2024-06-24 PROCEDURE — 83880 ASSAY OF NATRIURETIC PEPTIDE: CPT | Performed by: EMERGENCY MEDICINE

## 2024-06-24 PROCEDURE — 71275 CT ANGIOGRAPHY CHEST: CPT

## 2024-06-24 PROCEDURE — 83735 ASSAY OF MAGNESIUM: CPT | Performed by: EMERGENCY MEDICINE

## 2024-06-24 PROCEDURE — 80048 BASIC METABOLIC PNL TOTAL CA: CPT | Performed by: EMERGENCY MEDICINE

## 2024-06-24 PROCEDURE — 93005 ELECTROCARDIOGRAM TRACING: CPT

## 2024-06-24 PROCEDURE — 99285 EMERGENCY DEPT VISIT HI MDM: CPT

## 2024-06-24 RX ORDER — PREDNISONE 50 MG/1
50 TABLET ORAL DAILY
Qty: 5 TABLET | Refills: 0 | Status: SHIPPED | OUTPATIENT
Start: 2024-06-24

## 2024-06-24 RX ORDER — PREDNISONE 50 MG/1
50 TABLET ORAL DAILY
Qty: 5 TABLET | Refills: 0 | Status: SHIPPED | OUTPATIENT
Start: 2024-06-24 | End: 2024-06-24

## 2024-06-24 RX ADMIN — IOHEXOL 85 ML: 350 INJECTION, SOLUTION INTRAVENOUS at 11:34

## 2024-06-24 NOTE — DISCHARGE INSTRUCTIONS
A  personal message from Dr. Cyrus Gardner,  Thank you so much for allowing me to care for you today.    I pride myself in the care and attention I give all my patients.  I hope you were a witness to this tonight.   If for any reason your condition does not improve or worsens, or you have a question that was not answered during your visit you can feel free to text me on my personal phone #  # [phone].   I will answer to your message and continue your care past your emergency room visit.     Please understand that although you are being discharged because your condition has been deemed stable and able to be managed on an outpatient setting. However your condition may worsen as part of the natural progression of the illness/condition, if this occurs please come back to the emergency department for a repeat evaluation.

## 2024-06-24 NOTE — TELEPHONE ENCOUNTER
TUNDE    Spoke to pt and was trying to transfer her to a nurse to see what she should do as she told that she had covid 2 weeks ago and finished her paxlovid, but now had has pain in her right lung. As we were unable to get one on the nurses on the line, I got back to her to let her know that I would have a message put back to one of them she then decided to go to the ED as she stated that longer she waited the worse she felt.

## 2024-06-25 ENCOUNTER — TELEPHONE (OUTPATIENT)
Age: 59
End: 2024-06-25

## 2024-06-25 NOTE — TELEPHONE ENCOUNTER
Patient called to schedule an appointment for this week or next - per Dr. Arriola (see messages from yesterday).  Nothing available on schedule. Tried to transfer to office but no answer.  Please contact patient to schedule.  Thank you.

## 2024-06-27 ENCOUNTER — OFFICE VISIT (OUTPATIENT)
Age: 59
End: 2024-06-27
Payer: COMMERCIAL

## 2024-06-27 ENCOUNTER — APPOINTMENT (OUTPATIENT)
Dept: LAB | Facility: CLINIC | Age: 59
End: 2024-06-27
Payer: COMMERCIAL

## 2024-06-27 VITALS
OXYGEN SATURATION: 98 % | HEART RATE: 86 BPM | DIASTOLIC BLOOD PRESSURE: 74 MMHG | HEIGHT: 66 IN | WEIGHT: 155 LBS | BODY MASS INDEX: 24.91 KG/M2 | SYSTOLIC BLOOD PRESSURE: 126 MMHG

## 2024-06-27 DIAGNOSIS — R06.09 DYSPNEA ON EXERTION: ICD-10-CM

## 2024-06-27 DIAGNOSIS — R79.89 ELEVATED BRAIN NATRIURETIC PEPTIDE (BNP) LEVEL: ICD-10-CM

## 2024-06-27 DIAGNOSIS — M94.0 COSTOCHONDRITIS: ICD-10-CM

## 2024-06-27 DIAGNOSIS — U09.9 POST-COVID-19 CONDITION: ICD-10-CM

## 2024-06-27 LAB — CRP SERPL QL: 4.9 MG/L

## 2024-06-27 PROCEDURE — 86140 C-REACTIVE PROTEIN: CPT

## 2024-06-27 PROCEDURE — 99214 OFFICE O/P EST MOD 30 MIN: CPT | Performed by: INTERNAL MEDICINE

## 2024-06-27 PROCEDURE — 36415 COLL VENOUS BLD VENIPUNCTURE: CPT

## 2024-06-27 NOTE — PATIENT INSTRUCTIONS
Malaise/shortness of breath/URI symptoms - COVID-19 positive on home test 2 weeks ago. Status post Paxlovid completed on 6/15 with initial improvement then recurrence of symptoms. Prescribed 5 day course of prednisone 50 mg daily in ED ending 6/28. Reviewed recent CTA chest. Suspect Paxlovid rebound. Will check CRP, and determine if additional Paxlovid is necessary.     Costochondritis - should improve with current course of prednisone    Elevated BNP - In the setting of dyspnea. TTE 6/10 largely unchanged from prior.    Follow-up as needed

## 2024-06-27 NOTE — PROGRESS NOTES
"Assessment/Plan:    Diagnoses and all orders for this visit:    Dyspnea on exertion  -     C-reactive protein; Future    Post-COVID-19 condition  -     C-reactive protein; Future    Costochondritis    Elevated brain natriuretic peptide (BNP) level              Patient Instructions   Malaise/shortness of breath/URI symptoms - COVID-19 positive on home test 2 weeks ago. Status post Paxlovid completed on 6/15 with initial improvement then recurrence of symptoms. Prescribed 5 day course of prednisone 50 mg daily in ED ending 6/28. Reviewed recent CTA chest. Suspect Paxlovid rebound. Will check CRP, and determine if additional Paxlovid is necessary.     Costochondritis - should improve with current course of prednisone    Elevated BNP - In the setting of dyspnea. TTE 6/10 largely unchanged from prior.    Follow-up as needed    Subjective:      Patient ID: Poly Youssef is a 58 y.o. female    Patient presents for evaluation of generalized weakness, dyspnea on exertion, and lower chest wall pain/tenderness in the setting of recent COVID-19 status post therapy with Paxlovid. Patient initially experienced sore throat and nasal congestion 2 weeks ago, and took an at-home test. She subsequently felt improved on Paxlovid. However, this past weekend, she started feeling \"crummy\" again with postnasal drip and the aforementioned chest wall pain. A repeat COVID-19 test at home was positive, but patient is aware this can occur. Patient was concerned about PE given her history and went to the ED, with labs only remarkable for mildly elevated BNP from baseline. CTA chest did not reveal PE or any signs of pneumonia. She was prescribed a 5 day course of prednisone, of which she completed 3 days. Patient has not experienced any fevers or significant cough at any point in the last month. Also denies GI symptoms. No other complaints at this time.          Current Outpatient Medications:     albuterol (PROVENTIL HFA,VENTOLIN HFA) 90 " mcg/act inhaler, Inhale 2 puffs every 6 (six) hours as needed, Disp: , Rfl:     b complex vitamins capsule, Take 1 capsule by mouth daily, Disp: , Rfl:     carvedilol (COREG) 12.5 mg tablet, Take 12.5 mg by mouth 2 (two) times a day, Disp: , Rfl:     Fluticasone-Salmeterol (Advair Diskus) 250-50 mcg/dose inhaler, Inhale 1 puff 2 (two) times a day, Disp: 180 blister, Rfl: 3    levothyroxine 75 mcg tablet, TAKE 1 TABLET DAILY, Disp: 90 tablet, Rfl: 3    Magnesium 100 MG TABS, Take by mouth, Disp: , Rfl:     montelukast (SINGULAIR) 10 mg tablet, TAKE 1 TABLET DAILY AT BEDTIME, Disp: 90 tablet, Rfl: 3    Multiple Vitamin (MULTI-DAY PO), Take 1 tablet by mouth in the morning, Disp: , Rfl:     predniSONE 50 mg tablet, Take 1 tablet (50 mg total) by mouth daily, Disp: 5 tablet, Rfl: 0    Recent Results (from the past 1008 hour(s))   ECG 12 lead    Collection Time: 06/24/24  9:55 AM   Result Value Ref Range    Ventricular Rate 81 BPM    Atrial Rate 81 BPM    NJ Interval 210 ms    QRSD Interval 100 ms    QT Interval 398 ms    QTC Interval 462 ms    P Axis 85 degrees    QRS Axis 2 degrees    T Wave Axis 60 degrees   CBC and differential    Collection Time: 06/24/24 10:08 AM   Result Value Ref Range    WBC 6.80 4.31 - 10.16 Thousand/uL    RBC 3.93 3.81 - 5.12 Million/uL    Hemoglobin 12.3 11.5 - 15.4 g/dL    Hematocrit 37.7 34.8 - 46.1 %    MCV 96 82 - 98 fL    MCH 31.3 26.8 - 34.3 pg    MCHC 32.6 31.4 - 37.4 g/dL    RDW 13.3 11.6 - 15.1 %    MPV 10.1 8.9 - 12.7 fL    Platelets 338 149 - 390 Thousands/uL    nRBC 0 /100 WBCs    Segmented % 56 43 - 75 %    Immature Grans % 0 0 - 2 %    Lymphocytes % 24 14 - 44 %    Monocytes % 17 (H) 4 - 12 %    Eosinophils Relative 2 0 - 6 %    Basophils Relative 1 0 - 1 %    Absolute Neutrophils 3.86 1.85 - 7.62 Thousands/µL    Absolute Immature Grans 0.01 0.00 - 0.20 Thousand/uL    Absolute Lymphocytes 1.62 0.60 - 4.47 Thousands/µL    Absolute Monocytes 1.12 0.17 - 1.22 Thousand/µL     "Eosinophils Absolute 0.14 0.00 - 0.61 Thousand/µL    Basophils Absolute 0.05 0.00 - 0.10 Thousands/µL   Basic metabolic panel    Collection Time: 06/24/24 10:08 AM   Result Value Ref Range    Sodium 140 135 - 147 mmol/L    Potassium 4.1 3.5 - 5.3 mmol/L    Chloride 107 96 - 108 mmol/L    CO2 29 21 - 32 mmol/L    ANION GAP 4 4 - 13 mmol/L    BUN 11 5 - 25 mg/dL    Creatinine 0.69 0.60 - 1.30 mg/dL    Glucose 95 65 - 140 mg/dL    Calcium 9.1 8.4 - 10.2 mg/dL    eGFR 96 ml/min/1.73sq m   Magnesium    Collection Time: 06/24/24 10:08 AM   Result Value Ref Range    Magnesium 2.0 1.9 - 2.7 mg/dL   CK    Collection Time: 06/24/24 10:08 AM   Result Value Ref Range    Total CK 39 26 - 192 U/L   HS Troponin 0hr (reflex protocol)    Collection Time: 06/24/24 10:08 AM   Result Value Ref Range    hs TnI 0hr 6 \"Refer to ACS Flowchart\"- see link ng/L   B-Type Natriuretic Peptide(BNP)    Collection Time: 06/24/24 10:08 AM   Result Value Ref Range     (H) 0 - 100 pg/mL   HS Troponin I 2hr    Collection Time: 06/24/24 12:06 PM   Result Value Ref Range    hs TnI 2hr 6 \"Refer to ACS Flowchart\"- see link ng/L    Delta 2hr hsTnI 0 <20 ng/L       The following portions of the patient's history were reviewed and updated as appropriate: allergies, current medications, past family history, past medical history, past social history, past surgical history and problem list.     Review of Systems   Constitutional:  Positive for fatigue. Negative for chills and fever.   HENT:  Positive for congestion, postnasal drip and sore throat. Negative for ear pain.    Eyes:  Negative for pain and visual disturbance.   Respiratory:  Positive for shortness of breath. Negative for cough.    Cardiovascular:  Positive for chest pain. Negative for palpitations.   Gastrointestinal:  Negative for abdominal pain and vomiting.   Genitourinary:  Negative for dysuria and hematuria.   Musculoskeletal:  Negative for arthralgias and back pain.   Skin:  Negative for " color change and rash.   Neurological:  Positive for headaches. Negative for seizures and syncope.   All other systems reviewed and are negative.        Objective:      Vitals:    06/27/24 0933   BP: 126/74   Pulse: 86   SpO2: 98%          Physical Exam  HENT:      Nose: Congestion present.      Mouth/Throat:      Pharynx: No oropharyngeal exudate or posterior oropharyngeal erythema.   Eyes:      Extraocular Movements: Extraocular movements intact.      Pupils: Pupils are equal, round, and reactive to light.   Cardiovascular:      Rate and Rhythm: Normal rate and regular rhythm.      Heart sounds: No murmur heard.     No gallop.   Pulmonary:      Effort: Pulmonary effort is normal. No respiratory distress.      Breath sounds: Normal breath sounds. No wheezing or rales.   Chest:      Chest wall: Tenderness (lower chest wall, right lower anterior ribs and inferior sternum) present.   Abdominal:      Palpations: Abdomen is soft.   Musculoskeletal:      Right lower leg: No edema.      Left lower leg: No edema.   Lymphadenopathy:      Cervical: No cervical adenopathy.   Skin:     General: Skin is warm.   Neurological:      Mental Status: She is oriented to person, place, and time. Mental status is at baseline.      Motor: No weakness.

## 2024-07-02 ENCOUNTER — NURSE TRIAGE (OUTPATIENT)
Age: 59
End: 2024-07-02

## 2024-08-13 ENCOUNTER — TELEPHONE (OUTPATIENT)
Age: 59
End: 2024-08-13

## 2024-08-13 DIAGNOSIS — U09.9 LONG COVID: ICD-10-CM

## 2024-08-13 DIAGNOSIS — J45.20 MILD INTERMITTENT ASTHMA, UNSPECIFIED WHETHER COMPLICATED: Primary | ICD-10-CM

## 2024-08-13 PROBLEM — R60.0 BILATERAL LEG EDEMA: Status: RESOLVED | Noted: 2018-05-22 | Resolved: 2024-08-13

## 2024-08-13 PROBLEM — D64.9 ANEMIA: Status: RESOLVED | Noted: 2018-10-27 | Resolved: 2024-08-13

## 2024-08-13 RX ORDER — BUDESONIDE 0.5 MG/2ML
0.5 INHALANT ORAL 2 TIMES DAILY
Qty: 60 ML | Refills: 5 | Status: SHIPPED | OUTPATIENT
Start: 2024-08-13 | End: 2024-08-14 | Stop reason: SDUPTHER

## 2024-08-13 RX ORDER — ALBUTEROL SULFATE 0.83 MG/ML
2.5 SOLUTION RESPIRATORY (INHALATION) EVERY 6 HOURS PRN
Qty: 90 ML | Refills: 5 | Status: SHIPPED | OUTPATIENT
Start: 2024-08-13 | End: 2024-08-14 | Stop reason: SDUPTHER

## 2024-08-13 NOTE — PATIENT COMMUNICATION
Pts reports she had COVID in the past in June, then had the rebound COVID s/s with paxlovid. Now starting 8/7/24, She has chest tightness with her Hx of asthma, but states she has relief when she utilizes her inhaler.     Triage nurse had pt obtain vitals now and the readings were BP:135/90 and 145/91, HR: 102 and pulse ox 97%. Vitals are stable and Triage nurse called office. Pt has an appt for 8/14@0800. PCP will call pt back to follow up on next steps and Cardiac medication dosing, since she did have the low BP readings this morning.   Triage nurse educated pt that if she has any questions to please call office and if she exp SOB, chest pain, bp,hr or O2 status is low to call 911.    PCP please call pt and follow up.  Thank you

## 2024-08-13 NOTE — TELEPHONE ENCOUNTER
"Reason for Disposition  • [1] PERSISTING SYMPTOMS OF COVID-19 AND [2] NEW symptom AND [3] could be serious    Answer Assessment - Initial Assessment Questions  1. COVID-19 ONSET: \"When did the symptoms of COVID-19 first start?\"      Test positive in June 11th and same time was having symptoms.  But started to feel sick again last Thursday 8/7  2. DIAGNOSIS CONFIRMATION: \"How were you diagnosed?\" (e.g., COVID-19 oral or nasal viral test; COVID-19 antibody test; doctor visit)      Covid test   3. MAIN SYMPTOM:  \"What is your main concern or symptom right now?\" (e.g., breathing difficulty, cough, fatigue. loss of smell)      Difficult breathing at rest and exertion   4. SYMPTOM ONSET: \"When did the  June but now back  start?\"      8/7  5. BETTER-SAME-WORSE: \"Are you getting better, staying the same, or getting worse over the last 1 to 2 weeks?\"      No just nebulizer and maintaince  inhaler Yes on and off  6. RECENT MEDICAL VISIT: \"Have you been seen by a healthcare provider (doctor, NP, PA) for these persisting COVID-19 symptoms?\" If Yes, ask: \"When were you seen?\" (e.g., date)      no  7. COUGH: \"Do you have a cough?\" If Yes, ask: \"How bad is the cough?\"        Yes but not painful but just a tickle  8. FEVER: \"Do you have a fever?\" If Yes, ask: \"What is your temperature, how was it measured, and when did it start?\"      denies  9. BREATHING DIFFICULTY: \"Are you having any trouble breathing?\" If Yes, ask: \"How bad is your breathing?\" (e.g., mild, moderate, severe)     - MILD: No SOB at rest, mild SOB with walking, speaks normally in sentences, can lie down, no retractions, pulse < 100.     - MODERATE: SOB at rest, SOB with minimal exertion and prefers to sit, cannot lie down flat, speaks in phrases, mild retractions, audible wheezing, pulse 100-120.     - SEVERE: Very SOB at rest, speaks in single words, struggling to breathe, sitting hunched forward, retractions, pulse > 120        Sleeping a lot but uses her inhaler " "and she gets relief, she can't get a full breath  10. HIGH RISK DISEASE: \"Do you have any chronic medical problems?\" (e.g., asthma, heart or lung disease, weak immune system, obesity, etc.)        Asthma, pacemaker  11. VACCINE: \"Have you gotten the COVID-19 vaccine?\" If Yes, ask: \"Which one, how many shots, when did you get it?\"        6 -phizer  12. BOOSTER: \"Have you received your COVID-19 booster?\" If Yes, ask: \"Which one and when did you get it?\"        Yes and usnure  13. PREGNANCY: \"Is there any chance you are pregnant?\" \"When was your last menstrual period?\"        N/a  14. OTHER SYMPTOMS: \"Do you have any other symptoms?\"  (e.g., fatigue, headache, muscle pain, weakness)        fatigue  15. O2 SATURATION MONITOR:  \"Do you use an oxygen saturation monitor (pulse oximeter) at home?\" If Yes, ask \"What is your reading (oxygen level) today?\" \"What is your usual oxygen saturation reading?\" (e.g., 95%)        Pulse ox reading right now is 97%    Protocols used: Coronavirus (COVID-19) Persisting Symptoms Follow-up Call-ADULT-[place]    "

## 2024-08-13 NOTE — TELEPHONE ENCOUNTER
Patient has a low BP of 92/70 and it has been this way and she has concerns because she is taking BP medication and wants to know if she should hold the BP medication until her BP comes back up. Patient has been scheduled to come in and be seen by [facility] tomorrow, please advise

## 2024-08-13 NOTE — TELEPHONE ENCOUNTER
I just messaged her on a separate message to get some lab work done, I am glad she is coming in tomorrow

## 2024-08-14 ENCOUNTER — PATIENT MESSAGE (OUTPATIENT)
Age: 59
End: 2024-08-14

## 2024-08-14 ENCOUNTER — APPOINTMENT (OUTPATIENT)
Dept: LAB | Facility: CLINIC | Age: 59
End: 2024-08-14
Payer: COMMERCIAL

## 2024-08-14 ENCOUNTER — OFFICE VISIT (OUTPATIENT)
Age: 59
End: 2024-08-14
Payer: COMMERCIAL

## 2024-08-14 VITALS
BODY MASS INDEX: 24.43 KG/M2 | OXYGEN SATURATION: 98 % | HEIGHT: 66 IN | TEMPERATURE: 98.3 F | DIASTOLIC BLOOD PRESSURE: 64 MMHG | WEIGHT: 152 LBS | HEART RATE: 84 BPM | SYSTOLIC BLOOD PRESSURE: 102 MMHG

## 2024-08-14 DIAGNOSIS — I95.9 HYPOTENSION, UNSPECIFIED HYPOTENSION TYPE: Primary | ICD-10-CM

## 2024-08-14 DIAGNOSIS — J45.20 MILD INTERMITTENT ASTHMA, UNSPECIFIED WHETHER COMPLICATED: ICD-10-CM

## 2024-08-14 DIAGNOSIS — U09.9 LONG COVID: ICD-10-CM

## 2024-08-14 DIAGNOSIS — R10.11 RUQ PAIN: ICD-10-CM

## 2024-08-14 DIAGNOSIS — J06.9 UPPER RESPIRATORY TRACT INFECTION, UNSPECIFIED TYPE: ICD-10-CM

## 2024-08-14 LAB
ALBUMIN SERPL BCG-MCNC: 3.9 G/DL (ref 3.5–5)
ALP SERPL-CCNC: 73 U/L (ref 34–104)
ALT SERPL W P-5'-P-CCNC: 23 U/L (ref 7–52)
ANION GAP SERPL CALCULATED.3IONS-SCNC: 12 MMOL/L (ref 4–13)
AST SERPL W P-5'-P-CCNC: 24 U/L (ref 13–39)
BASOPHILS # BLD AUTO: 0.08 THOUSANDS/ÂΜL (ref 0–0.1)
BASOPHILS NFR BLD AUTO: 1 % (ref 0–1)
BILIRUB SERPL-MCNC: 0.63 MG/DL (ref 0.2–1)
BUN SERPL-MCNC: 12 MG/DL (ref 5–25)
CALCIUM SERPL-MCNC: 9.4 MG/DL (ref 8.4–10.2)
CHLORIDE SERPL-SCNC: 104 MMOL/L (ref 96–108)
CO2 SERPL-SCNC: 25 MMOL/L (ref 21–32)
CORTIS AM PEAK SERPL-MCNC: 14.3 UG/DL (ref 6.7–22.6)
CREAT SERPL-MCNC: 0.73 MG/DL (ref 0.6–1.3)
CRP SERPL QL: 17.2 MG/L
EOSINOPHIL # BLD AUTO: 0.24 THOUSAND/ÂΜL (ref 0–0.61)
EOSINOPHIL NFR BLD AUTO: 3 % (ref 0–6)
ERYTHROCYTE [DISTWIDTH] IN BLOOD BY AUTOMATED COUNT: 13.3 % (ref 11.6–15.1)
GFR SERPL CREATININE-BSD FRML MDRD: 91 ML/MIN/1.73SQ M
GLUCOSE P FAST SERPL-MCNC: 88 MG/DL (ref 65–99)
HCT VFR BLD AUTO: 40.5 % (ref 34.8–46.1)
HGB BLD-MCNC: 13 G/DL (ref 11.5–15.4)
IMM GRANULOCYTES # BLD AUTO: 0.02 THOUSAND/UL (ref 0–0.2)
IMM GRANULOCYTES NFR BLD AUTO: 0 % (ref 0–2)
LYMPHOCYTES # BLD AUTO: 1.67 THOUSANDS/ÂΜL (ref 0.6–4.47)
LYMPHOCYTES NFR BLD AUTO: 20 % (ref 14–44)
MCH RBC QN AUTO: 31.6 PG (ref 26.8–34.3)
MCHC RBC AUTO-ENTMCNC: 32.1 G/DL (ref 31.4–37.4)
MCV RBC AUTO: 98 FL (ref 82–98)
MONOCYTES # BLD AUTO: 1.1 THOUSAND/ÂΜL (ref 0.17–1.22)
MONOCYTES NFR BLD AUTO: 13 % (ref 4–12)
NEUTROPHILS # BLD AUTO: 5.16 THOUSANDS/ÂΜL (ref 1.85–7.62)
NEUTS SEG NFR BLD AUTO: 63 % (ref 43–75)
NRBC BLD AUTO-RTO: 0 /100 WBCS
PLATELET # BLD AUTO: 309 THOUSANDS/UL (ref 149–390)
PMV BLD AUTO: 11.6 FL (ref 8.9–12.7)
POTASSIUM SERPL-SCNC: 4 MMOL/L (ref 3.5–5.3)
PROT SERPL-MCNC: 7.3 G/DL (ref 6.4–8.4)
RBC # BLD AUTO: 4.12 MILLION/UL (ref 3.81–5.12)
SODIUM SERPL-SCNC: 141 MMOL/L (ref 135–147)
TSH SERPL DL<=0.05 MIU/L-ACNC: 4.44 UIU/ML (ref 0.45–4.5)
WBC # BLD AUTO: 8.27 THOUSAND/UL (ref 4.31–10.16)

## 2024-08-14 PROCEDURE — 82533 TOTAL CORTISOL: CPT

## 2024-08-14 PROCEDURE — 99214 OFFICE O/P EST MOD 30 MIN: CPT

## 2024-08-14 PROCEDURE — 86140 C-REACTIVE PROTEIN: CPT

## 2024-08-14 PROCEDURE — 36415 COLL VENOUS BLD VENIPUNCTURE: CPT

## 2024-08-14 PROCEDURE — 85025 COMPLETE CBC W/AUTO DIFF WBC: CPT

## 2024-08-14 PROCEDURE — 80053 COMPREHEN METABOLIC PANEL: CPT

## 2024-08-14 PROCEDURE — 84443 ASSAY THYROID STIM HORMONE: CPT

## 2024-08-14 RX ORDER — BUDESONIDE 0.5 MG/2ML
0.5 INHALANT ORAL 2 TIMES DAILY
Qty: 360 ML | Refills: 0 | Status: SHIPPED | OUTPATIENT
Start: 2024-08-14 | End: 2024-11-12

## 2024-08-14 RX ORDER — ALBUTEROL SULFATE 0.83 MG/ML
2.5 SOLUTION RESPIRATORY (INHALATION) EVERY 6 HOURS PRN
Qty: 90 ML | Refills: 3 | Status: SHIPPED | OUTPATIENT
Start: 2024-08-14 | End: 2024-11-12

## 2024-08-14 RX ORDER — ALBUTEROL SULFATE 0.83 MG/ML
2.5 SOLUTION RESPIRATORY (INHALATION) EVERY 6 HOURS PRN
Qty: 90 ML | Refills: 0 | Status: SHIPPED | OUTPATIENT
Start: 2024-08-14 | End: 2024-08-14 | Stop reason: SDUPTHER

## 2024-08-14 NOTE — PROGRESS NOTES
INTERNAL MEDICINE FOLLOW-UP VISIT  [facility] - [facility] INTERNAL MEDICINE [address]    NAME: Poly Youssef  AGE: 59 y.o. SEX: female  : 1965     DATE: 2024     Assessment and Plan:   1. Hypotension, unspecified hypotension type  BP in office was 102/64, on recheck it was 108/72.  Likely due to decreased oral intake and underlying diarrhea.  Continue carvedilol twice daily and increase salt intake.  Continue to check blood pressure 3 times daily.  Labs are ordered today, will follow-up with results.    2.  Mild intermittent asthma, unspecified whether complicated  Start nebulizer treatments, up to 4 times daily until wheezing resolves.    3.  Upper respiratory tract infection, unspecified type  COVID-negative at home.  Continue supportive care with Allegra, Flonase, and nebulizer treatments.  If you begin to experience any worsening symptoms like fever greater than 103, chills, or worsening cough notify the office.    4.  Right upper quadrant pain  Some tenderness to palpation of right upper quadrant.  Muscular versus gallbladder related?  Recent CTA of chest was normal.  CMP was ordered today, will follow-up with results. RUQ US ordered.         No follow-ups on file.       Chief Complaint:     Chief Complaint   Patient presents with    Hypotension     This morning 98/71 pulse was 105      History of Present Illness:   Patient is a 59-year-old female that presents today for multiple complaints.  She first noticed her blood pressure was low 1 week ago.  She typically checks it morning, afternoon, and evening.  Her normal blood pressures around 110/70s, but it was running around 80s/60s.  Around the same time, she started to feel sick.  She admits to rhinorrhea, dry cough, shortness of breath, wheezing, and some diarrhea.  She describes the diarrhea as softer and more frequent than her normal bowel movements.  This past weekend she slept for around 72 hours.  She had COVID  in June and has been experiencing post-COVID symptoms.    She is also describing some right upper quadrant pain.  She describes it as a pressure.  It started a few months ago.  She was diagnosed with costochondritis and was prescribed prednisone, but continues to have the pain.  She notes it is worse sometimes when she is eating, but also sometimes when she is not eating.  She denies any fevers, chills, abdominal bloating, nausea, vomiting, diarrhea, increase in stress, recent NSAID use, or constipation with it.  She has not tried anything for the pain at home.  She denies a family history of gallbladder disease and still has her gallbladder at this time.    The following portions of the patient's history were reviewed and updated as appropriate: allergies, current medications, past family history, past medical history, past social history, past surgical history and problem list.     Review of Systems:     Review of Systems   Constitutional:  Positive for appetite change and fatigue. Negative for chills and fever.   HENT:  Positive for rhinorrhea. Negative for ear discharge, ear pain, sinus pressure, sinus pain, sore throat and trouble swallowing.    Respiratory:  Positive for cough (dRY), shortness of breath and wheezing.    Cardiovascular:  Negative for chest pain.   Gastrointestinal:  Positive for abdominal pain and diarrhea. Negative for constipation, nausea and vomiting.   Genitourinary:  Negative for difficulty urinating and dysuria.   Musculoskeletal:  Negative for arthralgias and myalgias.   Skin:  Negative for rash.   Neurological:  Positive for headaches. Negative for dizziness and light-headedness.        Past Medical History:     Past Medical History:   Diagnosis Date    Allergic     Anemia 10/27/2018    Asthma     Bilateral leg edema 05/22/2018    Cancer (HCC)     Cardiomyopathy (HCC)     Coronary artery disease     Disease of thyroid gland     Heart disease     Insomnia 02/03/2016    Pacemaker      "Shingles         Current Medications:     Current Outpatient Medications:     albuterol (2.5 mg/3 mL) 0.083 % nebulizer solution, Take 3 mL (2.5 mg total) by nebulization every 6 (six) hours as needed for wheezing or shortness of breath, Disp: 90 mL, Rfl: 5    albuterol (PROVENTIL HFA,VENTOLIN HFA) 90 mcg/act inhaler, Inhale 2 puffs every 6 (six) hours as needed, Disp: , Rfl:     b complex vitamins capsule, Take 1 capsule by mouth daily, Disp: , Rfl:     budesonide (Pulmicort) 0.5 mg/2 mL nebulizer solution, Take 2 mL (0.5 mg total) by nebulization 2 (two) times a day Rinse mouth after use., Disp: 60 mL, Rfl: 5    carvedilol (COREG) 12.5 mg tablet, Take 12.5 mg by mouth 2 (two) times a day, Disp: , Rfl:     Fluticasone-Salmeterol (Advair Diskus) 250-50 mcg/dose inhaler, Inhale 1 puff 2 (two) times a day, Disp: 180 blister, Rfl: 3    levothyroxine 75 mcg tablet, TAKE 1 TABLET DAILY, Disp: 90 tablet, Rfl: 3    Magnesium 100 MG TABS, Take by mouth, Disp: , Rfl:     montelukast (SINGULAIR) 10 mg tablet, TAKE 1 TABLET DAILY AT BEDTIME, Disp: 90 tablet, Rfl: 3    Multiple Vitamin (MULTI-DAY PO), Take 1 tablet by mouth in the morning, Disp: , Rfl:     predniSONE 50 mg tablet, Take 1 tablet (50 mg total) by mouth daily, Disp: 5 tablet, Rfl: 0     Allergies:     Allergies   Allergen Reactions    Eggs Or Egg-Derived Products - Food Allergy Vomiting     Violent vomiting    Levofloxacin Other (See Comments)     Redness and burning at IV site    Medical Tape Blisters    Shellfish Allergy - Food Allergy Angioedema    Avocado - Food Allergy Rash    Tomato - Food Allergy Rash        Physical Exam:     Ht 5' 6\" (1.676 m)   BMI 25.02 kg/m²     Physical Exam  Vitals and nursing note reviewed.   Constitutional:       General: She is awake. She is not in acute distress.     Appearance: Normal appearance. She is well-developed, well-groomed and normal weight.   HENT:      Head: Normocephalic and atraumatic.      Right Ear: Hearing, " tympanic membrane, ear canal and external ear normal.      Left Ear: Hearing, tympanic membrane, ear canal and external ear normal.      Nose: Nose normal.      Mouth/Throat:      Lips: Pink.      Mouth: Mucous membranes are moist.      Pharynx: Uvula midline. Posterior oropharyngeal erythema present.   Eyes:      General: Lids are normal. Vision grossly intact. Gaze aligned appropriately.      Conjunctiva/sclera: Conjunctivae normal.   Neck:      Vascular: No carotid bruit.      Trachea: Trachea and phonation normal.   Cardiovascular:      Rate and Rhythm: Normal rate and regular rhythm.      Heart sounds: Normal heart sounds, S1 normal and S2 normal. No murmur heard.     No friction rub. No gallop.   Pulmonary:      Effort: Pulmonary effort is normal. No respiratory distress.      Breath sounds: Normal air entry. Wheezing present. No decreased breath sounds, rhonchi or rales.   Abdominal:      General: Abdomen is flat. Bowel sounds are normal.      Palpations: Abdomen is soft.      Tenderness: There is abdominal tenderness in the right upper quadrant.   Musculoskeletal:         General: No swelling.      Cervical back: Neck supple.      Right lower leg: No edema.      Left lower leg: No edema.   Skin:     General: Skin is warm.      Capillary Refill: Capillary refill takes less than 2 seconds.   Neurological:      Mental Status: She is alert.   Psychiatric:         Attention and Perception: Attention and perception normal.         Mood and Affect: Mood and affect normal.         Speech: Speech normal.         Behavior: Behavior normal. Behavior is cooperative.         Thought Content: Thought content normal.         Cognition and Memory: Cognition and memory normal.         Judgment: Judgment normal.           Data:     Laboratory Results: I have personally reviewed the pertinent laboratory results/reports   Radiology/Other Diagnostic Testing Results: I have personally reviewed pertinent reports.      Tayler Weber  TERA Nicholson  [facility] INTERNAL MEDICINE [address]

## 2024-08-18 ENCOUNTER — HOSPITAL ENCOUNTER (OUTPATIENT)
Dept: ULTRASOUND IMAGING | Facility: HOSPITAL | Age: 59
Discharge: HOME | End: 2024-08-18
Payer: COMMERCIAL

## 2024-08-18 DIAGNOSIS — R10.11 RUQ PAIN: ICD-10-CM

## 2024-08-18 PROCEDURE — 76705 ECHO EXAM OF ABDOMEN: CPT

## 2024-08-21 ENCOUNTER — TELEPHONE (OUTPATIENT)
Age: 59
End: 2024-08-21

## 2024-08-21 NOTE — TELEPHONE ENCOUNTER
----- Message from Tayler Nicholson PA-C sent at 8/21/2024  1:13 PM EDT -----  Right upper quadrant ultrasound was negative for any liver or gallbladder abnormality.  How is she feeling?

## 2024-08-23 DIAGNOSIS — R10.11 RUQ PAIN: Primary | ICD-10-CM

## 2024-08-29 ENCOUNTER — HOSPITAL ENCOUNTER (OUTPATIENT)
Dept: NUCLEAR MEDICINE | Facility: HOSPITAL | Age: 59
End: 2024-08-29
Attending: INTERNAL MEDICINE
Payer: COMMERCIAL

## 2024-08-29 DIAGNOSIS — R10.11 RUQ PAIN: ICD-10-CM

## 2024-08-29 PROCEDURE — 78227 HEPATOBIL SYST IMAGE W/DRUG: CPT

## 2024-08-29 PROCEDURE — A9537 TC99M MEBROFENIN: HCPCS

## 2024-08-29 RX ORDER — SINCALIDE 5 UG/5ML
0.02 INJECTION, POWDER, LYOPHILIZED, FOR SOLUTION INTRAVENOUS
Status: COMPLETED | OUTPATIENT
Start: 2024-08-29 | End: 2024-08-29

## 2024-08-29 RX ADMIN — SINCALIDE 1.4 MCG: 5 INJECTION, POWDER, LYOPHILIZED, FOR SOLUTION INTRAVENOUS at 13:19

## 2024-09-09 ENCOUNTER — APPOINTMENT (OUTPATIENT)
Dept: LAB | Facility: CLINIC | Age: 59
End: 2024-09-09
Payer: COMMERCIAL

## 2024-09-09 ENCOUNTER — OFFICE VISIT (OUTPATIENT)
Age: 59
End: 2024-09-09
Payer: COMMERCIAL

## 2024-09-09 VITALS
DIASTOLIC BLOOD PRESSURE: 60 MMHG | SYSTOLIC BLOOD PRESSURE: 106 MMHG | HEART RATE: 86 BPM | HEIGHT: 66 IN | WEIGHT: 154 LBS | OXYGEN SATURATION: 100 % | RESPIRATION RATE: 18 BRPM | BODY MASS INDEX: 24.75 KG/M2

## 2024-09-09 DIAGNOSIS — M94.0 COSTOCHONDRITIS: Primary | ICD-10-CM

## 2024-09-09 DIAGNOSIS — I95.2 HYPOTENSION DUE TO DRUGS: ICD-10-CM

## 2024-09-09 DIAGNOSIS — M94.0 COSTOCHONDRITIS: ICD-10-CM

## 2024-09-09 LAB
ALBUMIN SERPL BCG-MCNC: 4.2 G/DL (ref 3.5–5)
ALP SERPL-CCNC: 69 U/L (ref 34–104)
ALT SERPL W P-5'-P-CCNC: 21 U/L (ref 7–52)
ANION GAP SERPL CALCULATED.3IONS-SCNC: 9 MMOL/L (ref 4–13)
AST SERPL W P-5'-P-CCNC: 22 U/L (ref 13–39)
BASOPHILS # BLD AUTO: 0.06 THOUSANDS/ÂΜL (ref 0–0.1)
BASOPHILS NFR BLD AUTO: 1 % (ref 0–1)
BILIRUB DIRECT SERPL-MCNC: 0.06 MG/DL (ref 0–0.2)
BILIRUB SERPL-MCNC: 0.36 MG/DL (ref 0.2–1)
BUN SERPL-MCNC: 12 MG/DL (ref 5–25)
CALCIUM SERPL-MCNC: 9.4 MG/DL (ref 8.4–10.2)
CHLORIDE SERPL-SCNC: 105 MMOL/L (ref 96–108)
CO2 SERPL-SCNC: 29 MMOL/L (ref 21–32)
CREAT SERPL-MCNC: 0.74 MG/DL (ref 0.6–1.3)
CRP SERPL QL: 1.3 MG/L
EOSINOPHIL # BLD AUTO: 0.22 THOUSAND/ÂΜL (ref 0–0.61)
EOSINOPHIL NFR BLD AUTO: 2 % (ref 0–6)
ERYTHROCYTE [DISTWIDTH] IN BLOOD BY AUTOMATED COUNT: 13 % (ref 11.6–15.1)
ERYTHROCYTE [SEDIMENTATION RATE] IN BLOOD: 9 MM/HOUR (ref 0–29)
GFR SERPL CREATININE-BSD FRML MDRD: 89 ML/MIN/1.73SQ M
GLUCOSE SERPL-MCNC: 104 MG/DL (ref 65–140)
HCT VFR BLD AUTO: 38.4 % (ref 34.8–46.1)
HGB BLD-MCNC: 12.7 G/DL (ref 11.5–15.4)
IMM GRANULOCYTES # BLD AUTO: 0.02 THOUSAND/UL (ref 0–0.2)
IMM GRANULOCYTES NFR BLD AUTO: 0 % (ref 0–2)
LYMPHOCYTES # BLD AUTO: 2.33 THOUSANDS/ÂΜL (ref 0.6–4.47)
LYMPHOCYTES NFR BLD AUTO: 26 % (ref 14–44)
MCH RBC QN AUTO: 31.5 PG (ref 26.8–34.3)
MCHC RBC AUTO-ENTMCNC: 33.1 G/DL (ref 31.4–37.4)
MCV RBC AUTO: 95 FL (ref 82–98)
MONOCYTES # BLD AUTO: 0.92 THOUSAND/ÂΜL (ref 0.17–1.22)
MONOCYTES NFR BLD AUTO: 10 % (ref 4–12)
NEUTROPHILS # BLD AUTO: 5.5 THOUSANDS/ÂΜL (ref 1.85–7.62)
NEUTS SEG NFR BLD AUTO: 61 % (ref 43–75)
NRBC BLD AUTO-RTO: 0 /100 WBCS
PLATELET # BLD AUTO: 306 THOUSANDS/UL (ref 149–390)
PMV BLD AUTO: 11.4 FL (ref 8.9–12.7)
POTASSIUM SERPL-SCNC: 4.1 MMOL/L (ref 3.5–5.3)
PROT SERPL-MCNC: 7.3 G/DL (ref 6.4–8.4)
RBC # BLD AUTO: 4.03 MILLION/UL (ref 3.81–5.12)
SODIUM SERPL-SCNC: 143 MMOL/L (ref 135–147)
WBC # BLD AUTO: 9.05 THOUSAND/UL (ref 4.31–10.16)

## 2024-09-09 PROCEDURE — 80076 HEPATIC FUNCTION PANEL: CPT

## 2024-09-09 PROCEDURE — 36415 COLL VENOUS BLD VENIPUNCTURE: CPT

## 2024-09-09 PROCEDURE — 80048 BASIC METABOLIC PNL TOTAL CA: CPT

## 2024-09-09 PROCEDURE — 99214 OFFICE O/P EST MOD 30 MIN: CPT | Performed by: INTERNAL MEDICINE

## 2024-09-09 PROCEDURE — 86140 C-REACTIVE PROTEIN: CPT

## 2024-09-09 PROCEDURE — 85652 RBC SED RATE AUTOMATED: CPT

## 2024-09-09 PROCEDURE — 85025 COMPLETE CBC W/AUTO DIFF WBC: CPT

## 2024-09-09 RX ORDER — PREDNISONE 10 MG/1
TABLET ORAL
Qty: 30 TABLET | Refills: 0 | Status: SHIPPED | OUTPATIENT
Start: 2024-09-09 | End: 2024-09-21

## 2024-09-09 NOTE — PROGRESS NOTES
Assessment/Plan:    Diagnoses and all orders for this visit:    Costochondritis  -     predniSONE 10 mg tablet; Take 4 tablets (40 mg total) by mouth daily for 3 days, THEN 3 tablets (30 mg total) daily for 3 days, THEN 2 tablets (20 mg total) daily for 3 days, THEN 1 tablet (10 mg total) daily for 3 days. 4 po daily x 2, 3 po daily x 2, 2 po daily x 2, 1 po daily x 2.  -     Ambulatory referral to Spine & Pain Management; Future  -     C-reactive protein; Future  -     Sedimentation rate, automated; Future  -     CBC and differential; Future  -     Basic metabolic panel; Future  -     Hepatic function panel; Future    Hypotension due to drugs              Patient Instructions   Labs and imaging all reviewed    Persistent right upper quadrant pain is localized to right costal margin consistent with a costochondritis.  Pain is not reproduced with provocative muscular movements but definitely tender to direct palpation.  Will treat with prednisone taper and referral to pain management given the chronicity of this symptom.    Hypotension in setting of history of heart block and cardiomyopathy following with cardiology at [facility]-please reach out regarding your low blood pressures and consideration for medication adjustment.    Subjective:      Patient ID: Poly Youssef is a 59 y.o. female    Patient presents for follow-up of persistent right upper quadrant pain.  Pain is most exacerbated after prolonged car rides, reaching a 10 out of 10.  She feels as though there is something inside and expanding in the epigastric and right upper quadrant region like a baby is kicking under her ribs.  Pain is also been exacerbated by eating larger meals and she is taken to smaller more frequent meals.  There is been no fevers, chills, change in bowel or bladder.  Workup thus far has been unrevealing.  She is also troubled with persistent fatigue since COVID illness in June.  The symptoms started June 11 and  have been present daily since that time.  They were briefly improved with this course of steroids.  She had similar symptoms last December but they resolved.  There was positive De Dios sign with right upper quadrant ultrasound but HIDA was negative.  MRI from January reviewed with stable 0.5 cm pancreatic cyst.  CTA chest abdomen pelvis with PE protocol reviewed from June 24 and no diagnostic findings.          Current Outpatient Medications:     albuterol (2.5 mg/3 mL) 0.083 % nebulizer solution, Take 3 mL (2.5 mg total) by nebulization every 6 (six) hours as needed for wheezing or shortness of breath, Disp: 90 mL, Rfl: 3    albuterol (PROVENTIL HFA,VENTOLIN HFA) 90 mcg/act inhaler, Inhale 2 puffs every 6 (six) hours as needed, Disp: , Rfl:     b complex vitamins capsule, Take 1 capsule by mouth daily, Disp: , Rfl:     budesonide (Pulmicort) 0.5 mg/2 mL nebulizer solution, Take 2 mL (0.5 mg total) by nebulization 2 (two) times a day Rinse mouth after use., Disp: 360 mL, Rfl: 0    carvedilol (COREG) 12.5 mg tablet, Take 12.5 mg by mouth 2 (two) times a day, Disp: , Rfl:     fluticasone (VERAMYST) 27.5 MCG/SPRAY nasal spray, 2 sprays into each nostril daily, Disp: 9.1 mL, Rfl: 1    Fluticasone-Salmeterol (Advair Diskus) 250-50 mcg/dose inhaler, Inhale 1 puff 2 (two) times a day, Disp: 180 blister, Rfl: 3    levothyroxine 75 mcg tablet, TAKE 1 TABLET DAILY, Disp: 90 tablet, Rfl: 3    Magnesium 100 MG TABS, Take by mouth, Disp: , Rfl:     montelukast (SINGULAIR) 10 mg tablet, TAKE 1 TABLET DAILY AT BEDTIME, Disp: 90 tablet, Rfl: 3    Multiple Vitamin (MULTI-DAY PO), Take 1 tablet by mouth in the morning, Disp: , Rfl:     predniSONE 10 mg tablet, Take 4 tablets (40 mg total) by mouth daily for 3 days, THEN 3 tablets (30 mg total) daily for 3 days, THEN 2 tablets (20 mg total) daily for 3 days, THEN 1 tablet (10 mg total) daily for 3 days. 4 po daily x 2, 3 po daily x 2, 2 po daily x 2, 1 po daily x 2., Disp: 30 tablet,  Rfl: 0    Recent Results (from the past 1008 hour(s))   C-reactive protein    Collection Time: 08/14/24  7:39 AM   Result Value Ref Range    CRP 17.2 (H) <3.0 mg/L   Cortisol Level,7-9 AM Specimen    Collection Time: 08/14/24  7:39 AM   Result Value Ref Range    Cortisol - AM 14.3 6.7 - 22.6 ug/dL   Comprehensive metabolic panel    Collection Time: 08/14/24  7:39 AM   Result Value Ref Range    Sodium 141 135 - 147 mmol/L    Potassium 4.0 3.5 - 5.3 mmol/L    Chloride 104 96 - 108 mmol/L    CO2 25 21 - 32 mmol/L    ANION GAP 12 4 - 13 mmol/L    BUN 12 5 - 25 mg/dL    Creatinine 0.73 0.60 - 1.30 mg/dL    Glucose, Fasting 88 65 - 99 mg/dL    Calcium 9.4 8.4 - 10.2 mg/dL    AST 24 13 - 39 U/L    ALT 23 7 - 52 U/L    Alkaline Phosphatase 73 34 - 104 U/L    Total Protein 7.3 6.4 - 8.4 g/dL    Albumin 3.9 3.5 - 5.0 g/dL    Total Bilirubin 0.63 0.20 - 1.00 mg/dL    eGFR 91 ml/min/1.73sq m   CBC and differential    Collection Time: 08/14/24  7:39 AM   Result Value Ref Range    WBC 8.27 4.31 - 10.16 Thousand/uL    RBC 4.12 3.81 - 5.12 Million/uL    Hemoglobin 13.0 11.5 - 15.4 g/dL    Hematocrit 40.5 34.8 - 46.1 %    MCV 98 82 - 98 fL    MCH 31.6 26.8 - 34.3 pg    MCHC 32.1 31.4 - 37.4 g/dL    RDW 13.3 11.6 - 15.1 %    MPV 11.6 8.9 - 12.7 fL    Platelets 309 149 - 390 Thousands/uL    nRBC 0 /100 WBCs    Segmented % 63 43 - 75 %    Immature Grans % 0 0 - 2 %    Lymphocytes % 20 14 - 44 %    Monocytes % 13 (H) 4 - 12 %    Eosinophils Relative 3 0 - 6 %    Basophils Relative 1 0 - 1 %    Absolute Neutrophils 5.16 1.85 - 7.62 Thousands/µL    Absolute Immature Grans 0.02 0.00 - 0.20 Thousand/uL    Absolute Lymphocytes 1.67 0.60 - 4.47 Thousands/µL    Absolute Monocytes 1.10 0.17 - 1.22 Thousand/µL    Eosinophils Absolute 0.24 0.00 - 0.61 Thousand/µL    Basophils Absolute 0.08 0.00 - 0.10 Thousands/µL   TSH, 3rd generation with Free T4 reflex    Collection Time: 08/14/24  7:39 AM   Result Value Ref Range    TSH 3RD GENERATON 4.439  0.450 - 4.500 uIU/mL       The following portions of the patient's history were reviewed and updated as appropriate: allergies, current medications, past family history, past medical history, past social history, past surgical history and problem list.     Review of Systems   Constitutional:  Negative for appetite change, chills, diaphoresis, fatigue, fever and unexpected weight change.   HENT:  Negative for congestion, hearing loss and rhinorrhea.    Eyes:  Negative for visual disturbance.   Respiratory:  Negative for cough, chest tightness, shortness of breath and wheezing.    Cardiovascular:  Negative for chest pain, palpitations and leg swelling.   Gastrointestinal:  Positive for abdominal pain. Negative for blood in stool.   Endocrine: Negative for cold intolerance, heat intolerance, polydipsia and polyuria.   Genitourinary:  Negative for difficulty urinating, dysuria, frequency and urgency.   Musculoskeletal:  Negative for arthralgias and myalgias.   Skin:  Negative for rash.   Neurological:  Negative for dizziness, weakness, light-headedness and headaches.   Hematological:  Does not bruise/bleed easily.   Psychiatric/Behavioral:  Negative for dysphoric mood and sleep disturbance.          Objective:      Vitals:    09/09/24 1218   BP: 106/60   Pulse: 86   Resp: 18   SpO2: 100%          Physical Exam  Constitutional:       Appearance: She is well-developed.   HENT:      Head: Normocephalic and atraumatic.      Nose: Nose normal.   Eyes:      General: No scleral icterus.     Conjunctiva/sclera: Conjunctivae normal.      Pupils: Pupils are equal, round, and reactive to light.   Neck:      Thyroid: No thyromegaly.      Vascular: No JVD.      Trachea: No tracheal deviation.   Cardiovascular:      Rate and Rhythm: Normal rate and regular rhythm.      Heart sounds: No murmur heard.     No friction rub. No gallop.   Pulmonary:      Effort: Pulmonary effort is normal. No respiratory distress.      Breath sounds:  Normal breath sounds. No wheezing or rales.   Abdominal:      General: Bowel sounds are normal. There is no distension.      Palpations: Abdomen is soft. There is no mass.      Tenderness: There is no abdominal tenderness. There is no guarding or rebound.   Musculoskeletal:         General: No tenderness.      Cervical back: Normal range of motion and neck supple.      Comments: Exquisitely tender in right costal margin.  Notably there is also increased pain in the superior aspect of the surgical scar   Lymphadenopathy:      Cervical: No cervical adenopathy.   Skin:     General: Skin is warm and dry.      Findings: No erythema or rash.   Neurological:      Mental Status: She is alert and oriented to person, place, and time.      Cranial Nerves: No cranial nerve deficit.   Psychiatric:         Behavior: Behavior normal.         Thought Content: Thought content normal.         Judgment: Judgment normal.

## 2024-09-09 NOTE — PATIENT INSTRUCTIONS
Labs and imaging all reviewed    Persistent right upper quadrant pain is localized to right costal margin consistent with a costochondritis.  Pain is not reproduced with provocative muscular movements but definitely tender to direct palpation.  Will treat with prednisone taper and referral to pain management given the chronicity of this symptom.    Hypotension in setting of history of heart block and cardiomyopathy following with cardiology at [facility]-please reach out regarding your low blood pressures and consideration for medication adjustment.

## 2024-09-20 ENCOUNTER — OFFICE VISIT (OUTPATIENT)
Dept: OBGYN CLINIC | Facility: CLINIC | Age: 59
End: 2024-09-20
Payer: COMMERCIAL

## 2024-09-20 VITALS
SYSTOLIC BLOOD PRESSURE: 135 MMHG | DIASTOLIC BLOOD PRESSURE: 83 MMHG | BODY MASS INDEX: 24.43 KG/M2 | WEIGHT: 152 LBS | HEART RATE: 87 BPM | HEIGHT: 66 IN

## 2024-09-20 DIAGNOSIS — M75.81 ROTATOR CUFF TENDONITIS, RIGHT: Primary | ICD-10-CM

## 2024-09-20 DIAGNOSIS — Z98.890 HISTORY OF ARTHROSCOPY OF LEFT KNEE: ICD-10-CM

## 2024-09-20 DIAGNOSIS — M17.12 PRIMARY OSTEOARTHRITIS OF LEFT KNEE: ICD-10-CM

## 2024-09-20 PROCEDURE — 99213 OFFICE O/P EST LOW 20 MIN: CPT | Performed by: ORTHOPAEDIC SURGERY

## 2024-09-20 NOTE — PROGRESS NOTES
Patient Name:  Poly Youssef  MRN:  [id number]    Assessment & Plan     1. Rotator cuff tendonitis, right  2. Primary osteoarthritis of left knee  3. History of arthroscopy of left knee      Right shoulder rotator cuff tendonitis and mild Left knee osteoarthritis with history of partial meniscectomy, pes anserine bursitis   Previous x-rays and MRI reviewed   Patient to be full weightbearing to RUE (Right Upper Extremity) and LLE (Left Lower Extremity)  ROM as tolerated to  RUE (Right Upper Extremity) and LLE (Left Lower Extremity)  Discussed with patient since she just completed a 12 day oral prednisone taper we will hold off on repeat injections. Also, the patient is doing fairly well on exam today.  Patient to continue with HEP for strength and mobility training, exercises for her shoulder and knee were placed in her after visit summary.   Patient to continue at home analgesic regimen with Tylenol   Patient to follow up as needed.        History of the Present Illness   Poly Youssef is a 59 y.o. female with Right shoulder rotator cuff tendonitis, and left knee osteoarthritis with history of partial meniscectomy status post corticosteroid injections to both areas on 5/20/2024. The patient began having right shoulder symptoms about 2 weeks ago. She completed an oral prednisone taper of 12 days yesterday for costochondritis. This helped improve her shoulder symptoms but not her costochondritis. The patient noticed mild swelling in the posterior knee yesterday. She has clicking in the knee. She continues to perform a shoulder HEP and yoga. She uses heat as needed for symptom management.       Review of Systems     Review of Systems   Constitutional:  Negative for chills and fever.   HENT:  Negative for congestion.    Respiratory:  Negative for cough, chest tightness and shortness of breath.    Cardiovascular:  Negative for chest pain and palpitations.   Gastrointestinal:  Negative for abdominal pain.  "  Endocrine: Negative for cold intolerance and heat intolerance.   Musculoskeletal:  Positive for arthralgias (right shoulder, left knee).   Neurological:  Negative for syncope.   Psychiatric/Behavioral:  Negative for confusion.        Physical Exam     /83   Pulse 87   Ht 5' 6\" (1.676 m)   Wt 68.9 kg (152 lb)   BMI 24.53 kg/m²     Right Shoulder:   Active range of motion   170 degrees forward flexion  170 degrees abduction  70 degrees external rotation   4 level restriction internal rotation    There is no tenderness present over the shoulder.   Forward flexion testing 5/5 and improving  External rotation testing 5/5  Internal rotation testing 5/5 and improving  Torres test is negative   Marshall's test is negative    Speed's test is Negative  The patient is neurovascularly intact distally in the extremity.    Left Knee  Alignment neutral  There is no effusion.    There is tenderness over the medial joint line and pes anserine.    Range of motion from 0 to 130.    There is mild crepitus with range of motion.   There is 5/5 quadriceps strength.    The patient is able to perform a straight leg raise.    Stable to valgus and varus stress.   No ligament laxity   Toes are pink and mobile  Compartments are soft  Brisk capillary refill  Sensation intact  The patient is neurovascular intact distally.        Data Review     I have personally reviewed pertinent films in PACS, and my interpretation follows.    X-rays taken 04/29/2024 of Right shoulder demonstrate minimal glenohumeral degenerative changes and no acute fracture or dislocation.     MRI performed 05/08/2024 of Right shoulder demonstrates mild supraspinatus and subscapularis tendinosis without tear or atrophy noted.     X-rays taken 04/29/2024 of Left knee independently reviewed and demonstrate no obvious fracture or dislocation. Minimal medial joint space degenerative changes. Mild patellofemoral degenerative changes.     Social History     Tobacco Use "    Smoking status: Never    Smokeless tobacco: Never   Vaping Use    Vaping status: Never Used   Substance Use Topics    Alcohol use: Not Currently    Drug use: Never           Procedures  None performed.     Qian Guerrero   Scribe Attestation      I,:  Qian Guerrero am acting as a scribe while in the presence of the attending physician.:       I,:  Ilya Kirby, DO personally performed the services described in this documentation    as scribed in my presence.:

## 2024-09-20 NOTE — PATIENT INSTRUCTIONS
Pes Anserine (Knee Tendon) Bursitis  Bursae are small, jelly-like sacs that are located throughout the body, including around the shoulder, elbow, hip, knee, and heel. They contain a small amount of fluid and are positioned between bones and soft tissues, acting as cushions to help reduce friction.    Pes anserine bursitis is an inflammation of the bursa located between the shinbone (tibia) and three tendons of the hamstring muscle at the inside of the knee. It occurs when the bursa becomes irritated and produces too much fluid, which causes it to swell and put pressure on the adjacent parts of the knee.  Pain and tenderness on the inside of your knee, approximately 2 to 3 inches below the joint, are common symptoms of pes anserine bursitis of the knee.    Pain from pes anserine bursitis is located on the inside of the knee, just below the joint.     Cause  Bursitis usually develops as the result of overuse or constant friction and stress on the bursa. Pes anserine bursitis is common in athletes, particularly runners. People with osteoarthritis of the knee are also susceptible.    Several factors can contribute to the development of pes anserine bursitis, including:  Incorrect training techniques, such as neglecting to stretch, doing excessive hill running, and sudden increases in mileage  Tight hamstring muscles  Obesity  Being duck-footed or having knock knees  Osteoarthritis in the knee    Symptoms  The symptoms of pes anserine bursitis include:  Pain slowly developing on the inside of your knee and/or in the center of the shinbone, approximately 2 to 3 inches below the knee joint.  Pain increasing with exercise or climbing stairs  Puffiness or tenderness to the touch in this area    Doctor Examination  Your doctor will examine your knee and talk to you about your symptoms.    Symptoms of pes anserine bursitis may mimic those of a stress fracture, so an X-ray is usually required for diagnosis. Symptoms may also  mimic those of a medial meniscus tear.    Treatment  Treatment of pes anserine bursitis is almost entirely non-operative.    Athletes with pes anserine bursitis should take steps to modify their workout program so that the inflammation does not recur.  Other treatments include:  Rest. Discontinue the activity or substitute a different activity until the bursitis clears up.  Ice. Apply ice at regular intervals three or four times a day for 20 minutes at a time.  Anti-inflammatory medication. Non-steroidal anti-inflammatory medication (such as aspirin, ibuprofen, and naproxen) may ease the pain and reduce the inflammation. You can also use topical NSAIDs, like over-the-counter diclofenac gel.  Injection. Your doctor may inject a solution of anesthetic and steroid into the bursa, which often provides prompt relief.  Physical therapy. Your doctor may recommend physical therapy for specific stretching exercises, and ice and ultrasound treatments.          Home exercise plan                      Overhead pulley's   Hold for 20-30 seconds   Perform 5 times         Pull up bar or lat pull down hang  Attempt to lower your bottom to the bench below you while having hands overhead.  Hold for 20-30 seconds   Perform 5 times       Wall walks   Walk your fingers/hand up the wall and hold for 30 seconds at the top  3 x 10       Home exercises     Quad sets:  Flatten out knee by tightening quad muscles.  Hold for 3 seconds.  10-15 reps  3 sets          Also perform above exercises with toes turned toward the outside.        Also perform above exercises with toes turned toward the outside.        Short arc quad sets:  Hold for 3 seconds at the top.  Perform 10-15 reps  3 sets           Glute bridges:  Hips up and squeeze buttocks  Advance with single leg glute bridge once glute bridges are easy.  Hold buttocks for 3-5 seconds at the top  Perform 10-15 reps  3 sets         Clam Shells  Lay on your side.  Perform without resistance at  knees to begin.  Engage your core and lift your side off the floor.  Lift your knee in the air by using your glute muscles. Foot/ankle does not come off the other ankle.  3 x 10  Hold for 3 seconds at the top.  Progress to resisted clam shell with resiance band at knees       Progress to planked clam shells   Perform kneeling side plank  At the top of the side plan perform clam shell   3 x 10   Hold for 3 seconds at the top           Cambodian Dead lifts   Start with a Proper Setup:  Stand with your feet hip-width apart and the barbell in front of you.  Hinge at the hips, keeping your back straight and shoulders over the bar.  Maintain a slight bend in your knees.  Hinge at the Hips:  Imagine pushing your hips back as if closing a car door with your butt.  Keep your back flat and engage your core.  Use an Overhand , Keep the Bar Close, and Lift:   the bar with an overhand  (palms facing you) just outside your thighs.  Keep the bar close to your body throughout the movement.  Lift by driving your feet through the floor and extending your hips.  Pause at the Top:  Stand up fully, squeezing your glutes at the top.  Maintain a neutral spine and avoid hyperextending your lower back.    3 x 10      Progress to single leg Cambodian Dead Lift   Stand on injured side with a soft bent knee   Perform without weight in the early phases and progress to a single dumbbell in the opposite hand.   3 x 10       Bird dogs   On all 4's lift opposite arm and opposite leg while engaging core  3 x 10   Hold for 3 seconds at the top

## 2024-10-04 ENCOUNTER — TELEPHONE (OUTPATIENT)
Dept: PAIN MEDICINE | Facility: CLINIC | Age: 59
End: 2024-10-04

## 2024-10-09 ENCOUNTER — OFFICE VISIT (OUTPATIENT)
Dept: OBGYN CLINIC | Facility: CLINIC | Age: 59
End: 2024-10-09
Payer: COMMERCIAL

## 2024-10-09 VITALS
BODY MASS INDEX: 25.83 KG/M2 | HEIGHT: 65 IN | SYSTOLIC BLOOD PRESSURE: 124 MMHG | DIASTOLIC BLOOD PRESSURE: 76 MMHG | WEIGHT: 155 LBS

## 2024-10-09 DIAGNOSIS — Z01.419 ENCOUNTER FOR GYNECOLOGICAL EXAMINATION: Primary | ICD-10-CM

## 2024-10-09 PROCEDURE — 99386 PREV VISIT NEW AGE 40-64: CPT | Performed by: OBSTETRICS & GYNECOLOGY

## 2024-10-09 NOTE — PROGRESS NOTES
Assessment/Plan:   Encounter for gynecological examination  Pap/HPV due  ( HPV neg )  Mammogram: h/o bilateral mastectomy  Colonoscopy current    Encourage healthy diet, exercise, Calcium 1200mg per day and at least 800 iu Vitamin D daily.      Diagnoses and all orders for this visit:    Encounter for gynecological examination            Subjective:     Patient ID: Poly Youssef is a 59 y.o. female.    Postmenopausal patient presents for a routine annual visit  Last Pap Smear- 2022 NILM  Mammogram- masectomy  Colonoscopy- 10/21/2022 recall 3-5yr      Lives with partner. In good health. Sexually active with out concern  No bleeding. Bladder control is good. Bowels normal.   Daughter lives in [place], graduated [employer] school    Gynecologic Exam  She reports no genital itching, genital lesions, genital odor, genital rash, pelvic pain, vaginal bleeding or vaginal discharge. The patient is experiencing no pain. Pertinent negatives include no chills, constipation, diarrhea, fever, nausea, sore throat or vomiting.       Review of Systems   Constitutional:  Negative for activity change, appetite change, chills, fatigue and fever.   HENT:  Negative for rhinorrhea, sneezing and sore throat.    Eyes:  Negative for visual disturbance.   Respiratory:  Negative for cough, shortness of breath and wheezing.    Cardiovascular:  Negative for chest pain, palpitations and leg swelling.   Gastrointestinal:  Negative for abdominal distention, constipation, diarrhea, nausea and vomiting.   Genitourinary:  Negative for difficulty urinating, pelvic pain and vaginal discharge.   Neurological:  Negative for syncope and light-headedness.         Objective:     Physical Exam  Constitutional:       General: She is not in acute distress.     Appearance: She is well-developed. She is not diaphoretic.   Chest:   Breasts:     Breasts are symmetrical.      Right: No mass, skin change or tenderness.      Left: No mass,  skin change or tenderness.      Comments: Bilateral mastectomy  Abdominal:      General: Bowel sounds are normal. There is no distension.      Palpations: Abdomen is soft. There is no mass.      Tenderness: There is no abdominal tenderness. There is no guarding or rebound.   Genitourinary:     Labia:         Right: No rash, tenderness, lesion or injury.         Left: No rash, tenderness, lesion or injury.       Vagina: No vaginal discharge, erythema, tenderness or bleeding.      Cervix: No cervical motion tenderness, discharge or friability.      Uterus: Not deviated, not enlarged, not fixed and not tender.       Adnexa:         Right: No mass, tenderness or fullness.          Left: No mass, tenderness or fullness.        Comments: Urethral meatus- no lesions, non tender  Urethra non tender  Bladder non tender  Thin, atrophic vaginal mucosa  Skin:     General: Skin is warm and dry.      Coloration: Skin is not pale.      Findings: No erythema or rash.

## 2024-10-09 NOTE — ASSESSMENT & PLAN NOTE
Pap/HPV due 2026 ( HPV neg 2021)  Mammogram: h/o bilateral mastectomy  Colonoscopy current    Encourage healthy diet, exercise, Calcium 1200mg per day and at least 800 iu Vitamin D daily.

## 2024-10-11 ENCOUNTER — TELEPHONE (OUTPATIENT)
Dept: PAIN MEDICINE | Facility: CLINIC | Age: 59
End: 2024-10-11

## 2024-10-14 NOTE — PROGRESS NOTES
Assessment:  1. Musculoskeletal chest pain    2. Costochondritis        Plan:  No orders of the defined types were placed in this encounter.      New Medications Ordered This Visit   Medications    lidocaine (Lidoderm) 5 %     Sig: Apply 1 patch topically over 12 hours daily Remove & Discard patch within 12 hours or as directed by MD     Dispense:  10 patch     Refill:  0       My impressions and treatment recommendations were discussed in detail with the patient, who verbalized understanding and had no further questions.    Is a 59-year-old female presents her office chief complaint of right upper quadrant and right sided chest wall pain.  She has had thorough workup for this issue and all GI workup has notably been unremarkable.  On exam, there is significant tenderness in the right inferior sternal costal junction.  Symptoms are exacerbated with deep inspiration.  Carnett's sign is negative to rule out cutaneous nerve entrapment. Her laparotomy was also well because these symptoms started. Pericarditis also less likely given increased pain with leaning forward and lack of supportive findings on work up. Given relatively unremarkable workup, costochondritis is at top of differential diagnosis.  Interestingly, patient did not have significant relief with oral prednisone.    We discussed multiple treatments.  We did discuss costochondral joint injection which we will hold off on now for more conservative management.  She will trial lidocaine patch over the affected area for 12 hours at a time.  We will order 5% patch.  If not approved she can obtain 4% patch over-the-counter.  Additionally she may apply small amount of Voltaren gel which can be obtained over-the-counter.  She is unfortunately contraindicated from oral NSAIDs.    She will call us in a few weeks to report response to the treatment.  At that time can discuss injection if warranted.  We also discussed possibility of ordering nuclear medicine bone scan.  "          [place] Prescription Drug Monitoring Program report was reviewed and was appropriate     Complete risks and benefits including bleeding, infection, tissue reaction, nerve injury and allergic reaction were discussed. The approach was demonstrated using models and literature was provided. Verbal and written consent was obtained.     Discharge instructions were provided. I personally saw and examined the patient and I agree with the above discussed plan of care.    History of Present Illness:    Poly Youssef is a 59 y.o. female who presents to [facility] for initial evaluation of the above stated pain complaints. The patient has a past medical and chronic pain history as outlined in the assessment section. She was referred by Endy Arriola MD  [address]  [address]  [place] [address] .    Patient is here with chief complaint of \"costochondritis of the right rib cage\".  This is a chronic issue for over 4 months.  She reports symptoms began after demi COVID.    Pain is moderate to severe over the past month.  It is a 6-7 out of 10.  Nearly constant.  It is pressure-like, throbbing, dull/aching.    It is increased with bending, sitting, walking, exercise.  Decreased with lying down.    She has had a number of studies nuclear medicine hepatobiliary scan, CTA of the chest.  She has also had MRI of the abdomen and pelvis with normal liver size    Past medical history is also notable for Hodgkin's disease and breast cancer.    She reports oral prednisone did not help her symptoms. She reports no relief with tylenol.     She has a constant ache. Bouncing movement from the car is painful. It intensifies with deep breath. No major change with coughing. It is a balloon stretching sensation. aLWAYS a dull/achy uncomfortable. Leaning forward make sit worse.    Review of Systems:    Review of Systems   Gastrointestinal:  Positive for abdominal pain.           Past " Medical History:   Diagnosis Date    Abnormal Pap smear of cervix 2018    I did have my former GYN send my records, please let me know if you don't have them    Allergic     Anemia 10/27/2018    Asthma     Bilateral leg edema 05/22/2018    Cancer (HCC)     Cardiomyopathy (HCC)     CHF (congestive heart failure) (HCC)     Coronary artery disease     Disease of thyroid gland     Genital warts     Heart disease     Heart failure (HCC) 2017    pacemaker in 2018    Herpes 2004    Hodgkin lymphoma (HCC)     Hypothyroidism 1997    as a result of radiation therapy    Insomnia 02/03/2016    Pacemaker     Shingles     Varicella 1969       Past Surgical History:   Procedure Laterality Date    BREAST BIOPSY  1997    BREAST SURGERY      CARDIAC SURGERY      INSERT / REPLACE / REMOVE PACEMAKER      KNEE SURGERY      LYMPH NODE BIOPSY      MASTECTOMY  1997    double mastectomy       Family History   Problem Relation Age of Onset    Heart disease Mother     Cancer Mother     Dementia Mother     Thyroid disease Mother     Heart disease Father     Depression Father     Diabetes Father     Hypertension Father     Hypertension Brother     Colon cancer Neg Hx        Social History     Occupational History    Not on file   Tobacco Use    Smoking status: Never    Smokeless tobacco: Never   Vaping Use    Vaping status: Never Used   Substance and Sexual Activity    Alcohol use: Not Currently    Drug use: Never    Sexual activity: Yes     Partners: Male     Birth control/protection: Post-menopausal, Male Sterilization         Current Outpatient Medications:     albuterol (2.5 mg/3 mL) 0.083 % nebulizer solution, Take 3 mL (2.5 mg total) by nebulization every 6 (six) hours as needed for wheezing or shortness of breath, Disp: 90 mL, Rfl: 3    albuterol (PROVENTIL HFA,VENTOLIN HFA) 90 mcg/act inhaler, Inhale 2 puffs every 6 (six) hours as needed, Disp: , Rfl:     b complex vitamins capsule, Take 1 capsule by mouth daily, Disp: , Rfl:      "budesonide (Pulmicort) 0.5 mg/2 mL nebulizer solution, Take 2 mL (0.5 mg total) by nebulization 2 (two) times a day Rinse mouth after use., Disp: 360 mL, Rfl: 0    carvedilol (COREG) 12.5 mg tablet, Take 12.5 mg by mouth 2 (two) times a day, Disp: , Rfl:     fluticasone (VERAMYST) 27.5 MCG/SPRAY nasal spray, 2 sprays into each nostril daily, Disp: 9.1 mL, Rfl: 1    Fluticasone-Salmeterol (Advair Diskus) 250-50 mcg/dose inhaler, Inhale 1 puff 2 (two) times a day, Disp: 180 blister, Rfl: 3    levothyroxine 75 mcg tablet, TAKE 1 TABLET DAILY, Disp: 90 tablet, Rfl: 3    lidocaine (Lidoderm) 5 %, Apply 1 patch topically over 12 hours daily Remove & Discard patch within 12 hours or as directed by MD, Disp: 10 patch, Rfl: 0    Magnesium 100 MG TABS, Take by mouth, Disp: , Rfl:     montelukast (SINGULAIR) 10 mg tablet, TAKE 1 TABLET DAILY AT BEDTIME, Disp: 90 tablet, Rfl: 3    Multiple Vitamin (MULTI-DAY PO), Take 1 tablet by mouth in the morning, Disp: , Rfl:     Allergies   Allergen Reactions    Eggs Or Egg-Derived Products - Food Allergy Vomiting     Violent vomiting    Levofloxacin Other (See Comments)     Redness and burning at IV site    Medical Tape Blisters    Shellfish Allergy - Food Allergy Angioedema    Avocado - Food Allergy Rash    Tomato - Food Allergy Rash       Physical Exam:    /77   Pulse 85   Ht 5' 5\" (1.651 m)   Wt 70.3 kg (155 lb)   BMI 25.79 kg/m²     Constitutional: normal, well developed, well nourished, alert, in no distress and non-toxic and no overt pain behavior.  Eyes: anicteric  HEENT: grossly intact  Neck: supple, symmetric, trachea midline and no masses   Pulmonary:even and unlabored  Cardiovascular:No edema or pitting edema present  Skin:Normal without rashes or lesions and well hydrated  Psychiatric:Mood and affect appropriate  Neurologic:Cranial Nerves II-XII grossly intact  Musculoskeletal:normal  Abdomen: There is some tenderness to palpation in the right upper quadrant.  " Carnett's sign negative.  Along the inferior right border of the sternum there is significant tenderness to palpation at the sternal costal junction.     Imaging    MRI OF THE ABDOMEN WITH AND WITHOUT CONTRAST WITH MRCP    1/24/24     INDICATION: 58 years / Female. K86.89: Other specified diseases of pancreas.     COMPARISON: MR abdomen April 6, 2023     TECHNIQUE: Multiplanar/multisequence MRI of the abdomen with 3D MRCP was performed before and after administration of contrast.     IV Contrast: 6 mL of Gadobutrol injection (SINGLE-DOSE)     FINDINGS:     LOWER CHEST: Bilateral breast implants.     LIVER:  Normal in size and configuration.  No suspicious mass.  Few subcentimeter cysts.  Patent hepatic and portal veins.     BILE DUCTS: No intrahepatic or extrahepatic bile duct dilation. Common bile duct is normal in caliber. No choledocholithiasis, biliary stricture or suspicious mass.     GALLBLADDER: Normal     PANCREAS: 0.5 cm pancreatic body cyst is unchanged (series 10, image 65). The cyst communicates with a side branch of the nondilated main pancreatic duct. There are no solid components.     0.7 cm region of moderate T2 prolongation in the pancreatic tail is unchanged, and is normal interdigitated fat, as confirmed on the December 11, 2022 CT (series 7, image 15; series 301, image 118 on prior CT)     ADRENAL GLANDS: Unremarkable.     SPLEEN: Normal.     KIDNEYS/PROXIMAL URETERS: No hydroureteronephrosis. No suspicious renal mass.     BOWEL: No dilated loops of bowel.     PERITONEUM/RETROPERITONEUM: No ascites. Susceptibility artifact from retroperitoneal surgical clips.     LYMPH NODES: No abdominal lymphadenopathy.     VESSELS: No aneurysm.     ABDOMINAL WALL: Unremarkable     BONES: No suspicious osseous lesion.     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 For simple cyst(s) less than 1.5 cm,  recommend yearly follow-up 5 times, then every 2 years for 2 times. If cyst(s)   stable after 9 years, no further follow-ups. Recommend next follow-up in 1 year. Preferred imaging modality: abdomen MRI and MRCP with and without IV contrast, or triple phase abdomen CT with IV contrast, or abdomen MRI and MRCP without IV contrast.       No orders to display       No orders of the defined types were placed in this encounter.

## 2024-10-16 ENCOUNTER — CONSULT (OUTPATIENT)
Dept: PAIN MEDICINE | Facility: CLINIC | Age: 59
End: 2024-10-16
Payer: COMMERCIAL

## 2024-10-16 ENCOUNTER — TELEPHONE (OUTPATIENT)
Age: 59
End: 2024-10-16

## 2024-10-16 VITALS
DIASTOLIC BLOOD PRESSURE: 77 MMHG | SYSTOLIC BLOOD PRESSURE: 127 MMHG | WEIGHT: 155 LBS | HEIGHT: 65 IN | HEART RATE: 85 BPM | BODY MASS INDEX: 25.83 KG/M2

## 2024-10-16 DIAGNOSIS — M94.0 COSTOCHONDRITIS: ICD-10-CM

## 2024-10-16 DIAGNOSIS — R07.89 MUSCULOSKELETAL CHEST PAIN: Primary | ICD-10-CM

## 2024-10-16 PROCEDURE — 99214 OFFICE O/P EST MOD 30 MIN: CPT | Performed by: STUDENT IN AN ORGANIZED HEALTH CARE EDUCATION/TRAINING PROGRAM

## 2024-10-16 RX ORDER — LIDOCAINE 50 MG/G
1 PATCH TOPICAL DAILY
Qty: 10 PATCH | Refills: 0 | Status: SHIPPED | OUTPATIENT
Start: 2024-10-16

## 2024-10-16 NOTE — PATIENT INSTRUCTIONS
"The injection we talked about is a \"costochondral joint injection\".     Adult Patient Counseling Points     Lidocaine (Topical)   Warning   Viscous lidocaine   Very bad health problems (like seizures and heart that stops working) and death have happened in children younger than 3 years old. In these cases, this drug was not used how it was recommended. Do not use this drug to treat infants and children with teething pain. Talk with the doctor.  This drug must only be used in children younger than 3 years old when other treatments cannot be used. If using in a child younger than 3 years old for a reason other than teething pain, follow how to give as you were told by the doctor. Talk with the doctor.  What is this drug used for?   It is used to ease pain.  It is used to treat painful nerve diseases.  It is used to treat signs of hemorrhoids or rectal irritation.  It is used to ease pain caused by shingles.  It is used to treat mouth sores.  It may be given to you for other reasons. Talk with the doctor.  All drugs may cause side effects. However, many people have no side effects or only have minor side effects. Call your doctor or get medical help if any of these side effects or any other side effects bother you or do not go away:   All products:   Irritation where this drug is used  Rectal cream, rectal gel, and skin products other than skin patch and skin system:   Swelling  Redness  Change in color of skin  WARNING/CAUTION: Even though it may be rare, some people may have very bad and sometimes deadly side effects when taking a drug. Tell your doctor or get medical help right away if you have any of the following signs or symptoms that may be related to a very bad side effect:   All products:   Very bad health problems in children younger than 3 years old (like seizures and heart that stops working)  Too much acid in the blood (acidosis) like confusion; fast breathing; fast heartbeat; a heartbeat that does not " feel normal; very bad stomach pain, upset stomach, or throwing up; feeling very sleepy; shortness of breath; or feeling very tired or weak  Methemoglobinemia like a blue or gray color of the lips, nails, or skin; a heartbeat that does not feel normal; seizures; very bad dizziness or passing out; very bad headache; feeling very sleepy; feeling tired or weak; or shortness of breath  Trouble breathing, slow breathing, or shallow breathing  Very bad numbness and tingling  Feeling lightheaded, sleepy, confused, or having blurred eyesight  Seizures  Change in eyesight  Feeling nervous and excitable  Dizziness or passing out  Ringing in ears  Upset stomach or throwing up  Feeling hot or cold  Shakiness  Twitching  Slow heartbeat  Chest pain  Signs of an allergic reaction, like rash; hives; itching; red, swollen, blistered, or peeling skin with or without fever; wheezing; tightness in the chest or throat; trouble breathing, swallowing, or talking; unusual hoarseness; or swelling of the mouth, face, lips, tongue, or throat.  All skin products:   Skin infection like oozing, heat, swelling, redness, or pain  All rectal products:   Bleeding from rectum or rectal pain  Note: This is not a comprehensive list of all side effects. Talk to your doctor if you have questions.  Consumer Information Use and Disclaimer: This information should not be used to decide whether or not to take this medicine or any other medicine. Only the healthcare provider has the knowledge and training to decide which medicines are right for a specific patient. This information does not endorse any medicine as safe, effective, or approved for treating any patient or health condition. This is only a limited summary of general information about the medicine's uses from the patient education leaflet and is not intended to be comprehensive. This limited summary does NOT include all information available about the possible uses, directions, warnings,  precautions, interactions, adverse effects, or risks that may apply to this medicine. This information is not intended to provide medical advice, diagnosis or treatment and does not replace information you receive from the healthcare provider. For a more detailed summary of information about the risks and benefits of using this medicine, please speak with your healthcare provider and review the entire patient education leaflet.  Copyright   © 2024 [employer] and its affiliates and/or licensors. All rights reserved.  Last Reviewed Date   2024-02-16

## 2024-10-17 NOTE — TELEPHONE ENCOUNTER
PA for LIDO 5% PATCHES DENIED    Reason:(Screenshot if applicable)  Costochondritis is not a covered DX                Message sent to office clinical pool Yes    Denial letter scanned into Media Yes    **Please follow up with your patient regarding denial and next steps**

## 2024-10-25 DIAGNOSIS — J45.20 MILD INTERMITTENT ASTHMA, UNSPECIFIED WHETHER COMPLICATED: ICD-10-CM

## 2024-10-25 RX ORDER — BUDESONIDE 0.5 MG/2ML
INHALANT ORAL
Qty: 360 ML | Refills: 3 | Status: SHIPPED | OUTPATIENT
Start: 2024-10-25

## 2024-11-08 DIAGNOSIS — Z12.83 SKIN CANCER SCREENING: Primary | ICD-10-CM

## 2024-11-08 PROBLEM — Z01.419 ENCOUNTER FOR GYNECOLOGICAL EXAMINATION: Status: RESOLVED | Noted: 2024-10-09 | Resolved: 2024-11-08

## 2024-12-02 DIAGNOSIS — E03.9 ACQUIRED HYPOTHYROIDISM: ICD-10-CM

## 2024-12-02 DIAGNOSIS — J45.20 MILD INTERMITTENT ASTHMA, UNSPECIFIED WHETHER COMPLICATED: ICD-10-CM

## 2024-12-03 RX ORDER — LEVOTHYROXINE SODIUM 75 UG/1
75 TABLET ORAL DAILY
Qty: 90 TABLET | Refills: 1 | Status: SHIPPED | OUTPATIENT
Start: 2024-12-03

## 2024-12-03 RX ORDER — MONTELUKAST SODIUM 10 MG/1
10 TABLET ORAL
Qty: 90 TABLET | Refills: 1 | Status: SHIPPED | OUTPATIENT
Start: 2024-12-03

## 2024-12-18 ENCOUNTER — OFFICE VISIT (OUTPATIENT)
Age: 59
End: 2024-12-18
Payer: COMMERCIAL

## 2024-12-18 VITALS
DIASTOLIC BLOOD PRESSURE: 62 MMHG | RESPIRATION RATE: 14 BRPM | TEMPERATURE: 97.3 F | HEART RATE: 89 BPM | HEIGHT: 65 IN | SYSTOLIC BLOOD PRESSURE: 106 MMHG | BODY MASS INDEX: 26.06 KG/M2 | OXYGEN SATURATION: 97 % | WEIGHT: 156.4 LBS

## 2024-12-18 DIAGNOSIS — D18.01 CHERRY ANGIOMA: Primary | ICD-10-CM

## 2024-12-18 DIAGNOSIS — L82.1 SEBORRHEIC KERATOSIS: ICD-10-CM

## 2024-12-18 DIAGNOSIS — Z12.83 SKIN CANCER SCREENING: ICD-10-CM

## 2024-12-18 DIAGNOSIS — L82.0 INFLAMED SEBORRHEIC KERATOSIS: ICD-10-CM

## 2024-12-18 DIAGNOSIS — D22.9 MULTIPLE MELANOCYTIC NEVI: ICD-10-CM

## 2024-12-18 PROCEDURE — 99203 OFFICE O/P NEW LOW 30 MIN: CPT | Performed by: DERMATOLOGY

## 2024-12-18 PROCEDURE — 17110 DESTRUCTION B9 LES UP TO 14: CPT | Performed by: DERMATOLOGY

## 2024-12-18 NOTE — PROGRESS NOTES
"[facility]atology [facility] Note     Patient Name: Poly Youssef  Encounter Date: 12/18/2024     Have you been cared for by a [facility] Dermatologist in the last 3 years and, if so, which description applies to you?    NO.   I am considered a \"new\" patient and must complete all patient intake questions. I am FEMALE/of child-bearing potential.    REVIEW OF SYSTEMS:  Have you recently had or currently have any of the following? Recent fever or chills? No  Any non-healing wound? No  Are you pregnant or planning to become pregnant? No  Are you currently or planning to be nursing or breast feeding? No   PAST MEDICAL HISTORY:  Have you personally ever had or currently have any of the following?  If \"YES,\" then please provide more detail. Skin cancer (such as Melanoma, Basal Cell Carcinoma, Squamous Cell Carcinoma?  No  Tuberculosis, HIV/AIDS, Hepatitis B or C: No  Radiation Treatment YES, Last treatment in 1984   HISTORY OF IMMUNOSUPPRESSION:   Do you have a history of any of the following:  Systemic Immunosuppression such as Diabetes, Biologic or Immunotherapy, Chemotherapy, Organ Transplantation, Bone Marrow Transplantation or Prednsione?  YES, Breast cancer, and Hodkins lymphoma     Answering \"YES\" requires the addition of the dotphrase \"IMMUNOSUPPRESSED\" as the first diagnosis of the patient's visit.   FAMILY HISTORY:  Any \"first degree relatives\" (parent, brother, sister, or child) with the following?    Skin Cancer, Pancreatic or Other Cancer? YES, Father- skin cancer  Mother splenic lymphoma, Daughter Lymphoma    PATIENT EXPERIENCE:    Do you want the Dermatologist to perform a COMPLETE skin exam today including a clinical examination under the \"bra and underwear\" areas?  Yes  If necessary, do we have your permission to call and leave a detailed message on your Preferred Phone number that includes your specific medical information?  Yes      Allergies   Allergen Reactions    Eggs Or Egg-Derived Products " "- Food Allergy Vomiting     Violent vomiting    Levofloxacin Other (See Comments)     Redness and burning at IV site    Medical Tape Blisters    Shellfish Allergy - Food Allergy Angioedema    Avocado - Food Allergy Rash    Tomato - Food Allergy Rash      Current Outpatient Medications:     albuterol (PROVENTIL HFA,VENTOLIN HFA) 90 mcg/act inhaler, Inhale 2 puffs every 6 (six) hours as needed, Disp: , Rfl:     b complex vitamins capsule, Take 1 capsule by mouth daily, Disp: , Rfl:     budesonide (PULMICORT) 0.5 mg/2 mL nebulizer solution, INHALE CONTENTS OF 1 VIAL (2 ML/ 0.5 MG TOTAL) VIA NEBULIZER TWICE A DAY. RINSE MOUTH AFTER USE, Disp: 360 mL, Rfl: 3    carvedilol (COREG) 12.5 mg tablet, Take 12.5 mg by mouth 2 (two) times a day, Disp: , Rfl:     fluticasone (VERAMYST) 27.5 MCG/SPRAY nasal spray, 2 sprays into each nostril daily, Disp: 9.1 mL, Rfl: 1    Fluticasone-Salmeterol (Advair Diskus) 250-50 mcg/dose inhaler, Inhale 1 puff 2 (two) times a day, Disp: 180 blister, Rfl: 3    levothyroxine 75 mcg tablet, TAKE 1 TABLET DAILY, Disp: 90 tablet, Rfl: 1    lidocaine (Lidoderm) 5 %, Apply 1 patch topically over 12 hours daily Remove & Discard patch within 12 hours or as directed by MD, Disp: 10 patch, Rfl: 0    Magnesium 100 MG TABS, Take by mouth, Disp: , Rfl:     montelukast (SINGULAIR) 10 mg tablet, TAKE 1 TABLET DAILY AT BEDTIME, Disp: 90 tablet, Rfl: 1    Multiple Vitamin (MULTI-DAY PO), Take 1 tablet by mouth in the morning, Disp: , Rfl:           Whom besides the patient is providing clinical information about today's encounter?   NO ADDITIONAL HISTORIAN (patient alone provided history)    Physical Exam and Assessment/Plan by Diagnosis:            MELANOCYTIC NEVI (\"Moles\")    Physical Exam:  Anatomic Location Affected: Mostly on sun-exposed areas of the trunk and extremities   Morphological Description:  Scattered, 1-4mm round to ovoid, symmetrical-appearing, even bordered, skin colored to dark brown " "macules/papules, mostly in sun-exposed areas  Pertinent Positives:  Pertinent Negatives:    Additional History of Present Condition:  Present on exam     Assessment and Plan:  Based on a thorough discussion of this condition and the management approach to it (including a comprehensive discussion of the known risks, side effects and potential benefits of treatment), the patient (family) agrees to implement the following specific plan:  Provided handout with information regarding the ABCDE's of moles   Recommend routine skin exams every year   Sun avoidance, protective clothing (known as UPF clothing), and the use of at least SPF 30 sunscreens is advised. Sunscreen should be reapplied every two hours when outside.       SEBORRHEIC KERATOSIS; NON-INFLAMED    Physical Exam:  Anatomic Location Affected:  scattered across trunk, extremities, and face  Morphological Description:  Flat and raised, waxy, smooth to warty textured, yellow to brownish-grey to dark brown to blackish, discrete, \"stuck-on\" appearing papules.  Pertinent Positives:  Pertinent Negatives:    Additional History of Present Condition:  Patient reports new bumps on the skin.  Denies itch, burn, pain, bleeding or ulceration.  Present constantly; nothing seems to make it worse or better.  No prior treatment.      Assessment and Plan:  Based on a thorough discussion of this condition and the management approach to it (including a comprehensive discussion of the known risks, side effects and potential benefits of treatment), the patient (family) agrees to implement the following specific plan:  Reassured benign        ANGIOMA (\"CHERRY ANGIOMA\")    Physical Exam:  Anatomic Location: scattered across sun exposed areas of the trunk and extremities   Morphologic Description: Firm red to reddish-blue discrete papules  Pertinent Positives:  Pertinent Negatives:    Additional History of Present Condition:  Present on exam.     Assessment and Plan:  Reassured " benign     INFLAMED SEBORRHEIC KERATOSIS    Physical Exam:  Anatomic Location Affected & Morphological Description:  Waxy well demarcated sessile stuck on brown papule/s with erythema/crusting on the arm.      Additional History of Present Condition:  Present for years, recently has become painful, irritated, bleeding    Assessment and Plan:  Based on a thorough discussion of this condition and the management approach to it (including a comprehensive discussion of the known risks, side effects and potential benefits of treatment), the patient (family) agrees to implement the following specific plan:  Cryotherapy given symptoms and inflammation  After care discussed    PROCEDURE:  DESTRUCTION OF BENIGN LESIONS  After a thorough discussion of treatment options and risk/benefits/alternatives (including but not limited to local pain, scarring, dyspigmentation, blistering, and possible superinfection), verbal and written consent were obtained and the aforementioned lesions were treated on with cryotherapy using liquid nitrogen x 1 cycle for 5-10 seconds.    TOTAL NUMBER of 1 lesions were treated today on the ANATOMIC LOCATION: right upper arm.     The patient tolerated the procedure well, and after-care instructions were provided.     Scribe Attestation      I,:  Rosalina Gonzalez MA am acting as a scribe while in the presence of the attending physician.:       I,:  Tyson Crouch MD personally performed the services described in this documentation    as scribed in my presence.:

## 2024-12-18 NOTE — PATIENT INSTRUCTIONS
"MELANOCYTIC NEVI (\"Moles\")         Assessment and Plan:  Based on a thorough discussion of this condition and the management approach to it (including a comprehensive discussion of the known risks, side effects and potential benefits of treatment), the patient (family) agrees to implement the following specific plan:  Provided handout with information regarding the ABCDE's of moles   Recommend routine skin exams every year   Sun avoidance, protective clothing (known as UPF clothing), and the use of at least SPF 30 sunscreens is advised. Sunscreen should be reapplied every two hours when outside.       SEBORRHEIC KERATOSIS; NON-INFLAMED    Assessment and Plan:  Based on a thorough discussion of this condition and the management approach to it (including a comprehensive discussion of the known risks, side effects and potential benefits of treatment), the patient (family) agrees to implement the following specific plan:  Reassured benign        ANGIOMA (\"CHERRY ANGIOMA\")      Assessment and Plan:  Reassured benign     INFLAMED SEBORRHEIC KERATOSIS    Assessment and Plan:  Based on a thorough discussion of this condition and the management approach to it (including a comprehensive discussion of the known risks, side effects and potential benefits of treatment), the patient (family) agrees to implement the following specific plan:  Cryotherapy given symptoms and inflammation  After care discussed    PROCEDURE:  DESTRUCTION OF BENIGN LESIONS  After a thorough discussion of treatment options and risk/benefits/alternatives (including but not limited to local pain, scarring, dyspigmentation, blistering, and possible superinfection), verbal and written consent were obtained and the aforementioned lesions were treated on with cryotherapy using liquid nitrogen x 1 cycle for 5-10 seconds.    TOTAL NUMBER of 1 lesions were treated today on the ANATOMIC LOCATION: right upper arm.     The patient tolerated the procedure well, and " after-care instructions were provided.

## 2024-12-20 ENCOUNTER — TELEPHONE (OUTPATIENT)
Dept: ADMINISTRATIVE | Facility: OTHER | Age: 59
End: 2024-12-20

## 2024-12-20 DIAGNOSIS — Z90.13 HISTORY OF BILATERAL MASTECTOMY: Primary | ICD-10-CM

## 2024-12-20 NOTE — TELEPHONE ENCOUNTER
----- Message from Sylvie CLEARY sent at 12/19/2024  2:47 PM EST -----  Regarding: [facility] MAMMO EXCLUSION BILATERAL MASTECTOMY 1997 12/19/24 2:47 PM    Hello, our patient attached above has had bilateral mastectomy completed/performed. Please assist in updating the patient chart by pulling the Care Everywhere (CE) document. The date of service is 1997.     Thank you,  Sylvie Miller  [facility] [address]

## 2024-12-20 NOTE — TELEPHONE ENCOUNTER
12/20/24 12:17 PM    Hello, our patient attached above has had bilateral mastectomy completed/performed. Please assist in updating the patient chart by pulling the Care Everywhere (CE) document. The date of service is 1997.     Thank you,  Lanny Rogers MA  [facility]

## 2024-12-23 PROBLEM — Z90.13 HISTORY OF BILATERAL MASTECTOMY: Status: ACTIVE | Noted: 2024-12-23

## 2024-12-23 NOTE — TELEPHONE ENCOUNTER
12/23/24 9:33 AM    After review of Exclusion document(s) for bilateral mastectomy, documentation qualifies as an exclusion and the problem list  within Epic has been updated.    Pradnya Bal

## 2024-12-27 NOTE — TELEPHONE ENCOUNTER
Upon review of the In Basket request we were able to locate, review, and update the patient chart as requested for Bilateral Mastectomy.    Any additional questions or concerns should be emailed to the Practice Liaisons via the appropriate education email address, please do not reply via In Basket.    Thank you  Lanny Rogers MA   [facility]

## 2025-03-07 DIAGNOSIS — E78.5 DYSLIPIDEMIA: ICD-10-CM

## 2025-03-07 DIAGNOSIS — R73.01 IMPAIRED FASTING GLUCOSE: ICD-10-CM

## 2025-03-07 DIAGNOSIS — R53.83 FATIGUE, UNSPECIFIED TYPE: Primary | ICD-10-CM

## 2025-03-08 ENCOUNTER — APPOINTMENT (OUTPATIENT)
Dept: LAB | Facility: HOSPITAL | Age: 60
End: 2025-03-08
Payer: COMMERCIAL

## 2025-03-08 DIAGNOSIS — R53.83 FATIGUE, UNSPECIFIED TYPE: ICD-10-CM

## 2025-03-08 DIAGNOSIS — R73.01 IMPAIRED FASTING GLUCOSE: ICD-10-CM

## 2025-03-08 DIAGNOSIS — E78.5 DYSLIPIDEMIA: ICD-10-CM

## 2025-03-08 LAB
ALBUMIN SERPL BCG-MCNC: 4.2 G/DL (ref 3.5–5)
ALP SERPL-CCNC: 81 U/L (ref 34–104)
ALT SERPL W P-5'-P-CCNC: 38 U/L (ref 7–52)
ANION GAP SERPL CALCULATED.3IONS-SCNC: 3 MMOL/L (ref 4–13)
AST SERPL W P-5'-P-CCNC: 31 U/L (ref 13–39)
BASOPHILS # BLD AUTO: 0.09 THOUSANDS/ÂΜL (ref 0–0.1)
BASOPHILS NFR BLD AUTO: 1 % (ref 0–1)
BILIRUB SERPL-MCNC: 0.6 MG/DL (ref 0.2–1)
BUN SERPL-MCNC: 13 MG/DL (ref 5–25)
CALCIUM SERPL-MCNC: 9.6 MG/DL (ref 8.4–10.2)
CHLORIDE SERPL-SCNC: 107 MMOL/L (ref 96–108)
CHOLEST SERPL-MCNC: 177 MG/DL (ref ?–200)
CO2 SERPL-SCNC: 31 MMOL/L (ref 21–32)
CREAT SERPL-MCNC: 0.78 MG/DL (ref 0.6–1.3)
EOSINOPHIL # BLD AUTO: 0.27 THOUSAND/ÂΜL (ref 0–0.61)
EOSINOPHIL NFR BLD AUTO: 4 % (ref 0–6)
ERYTHROCYTE [DISTWIDTH] IN BLOOD BY AUTOMATED COUNT: 13.8 % (ref 11.6–15.1)
EST. AVERAGE GLUCOSE BLD GHB EST-MCNC: 120 MG/DL
GFR SERPL CREATININE-BSD FRML MDRD: 84 ML/MIN/1.73SQ M
GLUCOSE P FAST SERPL-MCNC: 96 MG/DL (ref 65–99)
HBA1C MFR BLD: 5.8 %
HCT VFR BLD AUTO: 39.5 % (ref 34.8–46.1)
HDLC SERPL-MCNC: 59 MG/DL
HGB BLD-MCNC: 12.4 G/DL (ref 11.5–15.4)
IMM GRANULOCYTES # BLD AUTO: 0.02 THOUSAND/UL (ref 0–0.2)
IMM GRANULOCYTES NFR BLD AUTO: 0 % (ref 0–2)
LDLC SERPL CALC-MCNC: 105 MG/DL (ref 0–100)
LYMPHOCYTES # BLD AUTO: 1.88 THOUSANDS/ÂΜL (ref 0.6–4.47)
LYMPHOCYTES NFR BLD AUTO: 25 % (ref 14–44)
MCH RBC QN AUTO: 30.3 PG (ref 26.8–34.3)
MCHC RBC AUTO-ENTMCNC: 31.4 G/DL (ref 31.4–37.4)
MCV RBC AUTO: 97 FL (ref 82–98)
MONOCYTES # BLD AUTO: 0.88 THOUSAND/ÂΜL (ref 0.17–1.22)
MONOCYTES NFR BLD AUTO: 12 % (ref 4–12)
NEUTROPHILS # BLD AUTO: 4.48 THOUSANDS/ÂΜL (ref 1.85–7.62)
NEUTS SEG NFR BLD AUTO: 58 % (ref 43–75)
NONHDLC SERPL-MCNC: 118 MG/DL
NRBC BLD AUTO-RTO: 0 /100 WBCS
PLATELET # BLD AUTO: 358 THOUSANDS/UL (ref 149–390)
PMV BLD AUTO: 10.6 FL (ref 8.9–12.7)
POTASSIUM SERPL-SCNC: 4.4 MMOL/L (ref 3.5–5.3)
PROT SERPL-MCNC: 7.3 G/DL (ref 6.4–8.4)
RBC # BLD AUTO: 4.09 MILLION/UL (ref 3.81–5.12)
SODIUM SERPL-SCNC: 141 MMOL/L (ref 135–147)
TRIGL SERPL-MCNC: 66 MG/DL (ref ?–150)
TSH SERPL DL<=0.05 MIU/L-ACNC: 2.41 UIU/ML (ref 0.45–4.5)
WBC # BLD AUTO: 7.62 THOUSAND/UL (ref 4.31–10.16)

## 2025-03-08 PROCEDURE — 85025 COMPLETE CBC W/AUTO DIFF WBC: CPT

## 2025-03-08 PROCEDURE — 80053 COMPREHEN METABOLIC PANEL: CPT

## 2025-03-08 PROCEDURE — 83036 HEMOGLOBIN GLYCOSYLATED A1C: CPT

## 2025-03-08 PROCEDURE — 80061 LIPID PANEL: CPT

## 2025-03-08 PROCEDURE — 84443 ASSAY THYROID STIM HORMONE: CPT

## 2025-03-08 PROCEDURE — 36415 COLL VENOUS BLD VENIPUNCTURE: CPT

## 2025-03-10 ENCOUNTER — OFFICE VISIT (OUTPATIENT)
Age: 60
End: 2025-03-10
Payer: COMMERCIAL

## 2025-03-10 VITALS
DIASTOLIC BLOOD PRESSURE: 76 MMHG | HEIGHT: 65 IN | SYSTOLIC BLOOD PRESSURE: 130 MMHG | OXYGEN SATURATION: 98 % | HEART RATE: 86 BPM | WEIGHT: 158 LBS | RESPIRATION RATE: 16 BRPM | BODY MASS INDEX: 26.33 KG/M2

## 2025-03-10 DIAGNOSIS — E03.9 ACQUIRED HYPOTHYROIDISM: ICD-10-CM

## 2025-03-10 DIAGNOSIS — M94.0 COSTOCHONDRITIS: ICD-10-CM

## 2025-03-10 DIAGNOSIS — Z90.81 H/O SPLENECTOMY: ICD-10-CM

## 2025-03-10 DIAGNOSIS — Z00.00 WELL ADULT EXAM: Primary | ICD-10-CM

## 2025-03-10 DIAGNOSIS — R73.01 IMPAIRED FASTING GLUCOSE: ICD-10-CM

## 2025-03-10 DIAGNOSIS — I44.2 COMPLETE HEART BLOCK (HCC): ICD-10-CM

## 2025-03-10 DIAGNOSIS — F51.01 PRIMARY INSOMNIA: ICD-10-CM

## 2025-03-10 DIAGNOSIS — Z23 ENCOUNTER FOR IMMUNIZATION: ICD-10-CM

## 2025-03-10 DIAGNOSIS — C81.91 HODGKIN LYMPHOMA OF LYMPH NODES OF NECK, UNSPECIFIED HODGKIN LYMPHOMA TYPE (HCC): ICD-10-CM

## 2025-03-10 DIAGNOSIS — C50.919 BREAST CANCER, STAGE 2, UNSPECIFIED LATERALITY (HCC): ICD-10-CM

## 2025-03-10 PROCEDURE — 90621 MENB-FHBP VACC 2/3 DOSE IM: CPT

## 2025-03-10 PROCEDURE — 90471 IMMUNIZATION ADMIN: CPT

## 2025-03-10 PROCEDURE — 90472 IMMUNIZATION ADMIN EACH ADD: CPT

## 2025-03-10 PROCEDURE — 99396 PREV VISIT EST AGE 40-64: CPT | Performed by: INTERNAL MEDICINE

## 2025-03-10 PROCEDURE — 90677 PCV20 VACCINE IM: CPT

## 2025-03-10 RX ORDER — ZALEPLON 5 MG/1
5 CAPSULE ORAL
Qty: 30 CAPSULE | Refills: 0 | Status: SHIPPED | OUTPATIENT
Start: 2025-03-10

## 2025-03-10 NOTE — PROGRESS NOTES
Name: Poly Youssef      : 1965      MRN: [id number]  Encounter Provider: Endy Arriola MD  Encounter Date: 3/10/2025   Encounter department: [facility] [address]  :  Assessment & Plan  Well adult exam  Up-to-date with the exception of meningococcal and pneumococcal vaccines  Orders:    Basic metabolic panel; Future    CBC and differential; Future    Lipid panel; Future    Hemoglobin A1C; Future    Hepatic function panel; Future    TSH, 3rd generation with Free T4 reflex; Future    Acquired hypothyroidism  Remained stable on levothyroxine  Orders:    TSH, 3rd generation with Free T4 reflex; Future    Costochondritis  Still with intermittent pains.  Unresponsive to steroids.  Continue with topical agents as discussed       Breast cancer, stage 2, unspecified laterality (HCC)  NAD x 28 years       H/O splenectomy  Meningococcal and pneumococcal vaccines due       Hodgkin lymphoma of lymph nodes of neck, unspecified Hodgkin lymphoma type (HCC)  NAD       Complete heart block (HCC)  Stable following with cardiology status post pacemaker       Impaired fasting glucose  A1c improved, continue exercise and low-carb diet  Orders:    Hemoglobin A1C; Future    Primary insomnia  Risks, benefits, side effects and expectations of several different sleep aids discussed.  Unisom has not been helpful.  Melatonin does not allow for consistent sleep.  Orders:    zaleplon (SONATA) 5 MG capsule; Take 1 capsule (5 mg total) by mouth daily at bedtime as needed for sleep           History of Present Illness   Yearly well visit  Living in [facility], plans to come closer in , living w/ Cory.  Self employed w/ consulting.    Walks dog 1-2 mi daily 2 dogs,     Has h/o ocular migraines in past, but different and not lately.   Hodkins age '18 s/p XRT and splenectomy.    H/o R Breast CA age 31 s/p b/l mastectomy d/t mantal radiation, BRCA negative.  S/p saline, then silicone implants.   No recent onc  f/u.    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  10 yrs left on battery f/b cardiology q 6 mos.   MV regurg f/b [facility]  Ralf Monge and Naveen  PPM c/b PE x 2 after first and second unclear etiol, but first was a 5 hr procedure.  Tx'd Xarelto x 6 mos for both.  No asa.  Seeing EP yearly  L knee meniscal tear in 08 w/ exacerbation in 2021 w/ aggressive treadmill exercise.  Still w/ baker cyst and recurrent swelling.  Completed PT and continues w/ exercises.  Working w/ Dr. Kirby, improved w/ injection.    Herniated disc-doing ok, worse when knee is bad.  Doing yoga.    Asthma stable w/ wixela and singulair, no albuterol since fall.   Still dealing w/ R chest/rib pain, no improvement w/ steroids.  Better w/ lidocaine voltaren.  Seen by PM, didn't want nerve block.    No recent positional vertigo, cured w/ Eply in office x 1.  Colonoscopy 10/22 w/ polyps removed-TA  Sleep is poor, fractionated.    GYN-UTD      Review of Systems   Constitutional:  Negative for appetite change, chills, diaphoresis, fatigue, fever and unexpected weight change.   HENT:  Negative for congestion, hearing loss and rhinorrhea.    Eyes:  Negative for visual disturbance.   Respiratory:  Negative for cough, chest tightness, shortness of breath and wheezing.    Cardiovascular:  Positive for chest pain. Negative for palpitations and leg swelling.   Gastrointestinal:  Negative for abdominal pain and blood in stool.   Endocrine: Negative for cold intolerance, heat intolerance, polydipsia and polyuria.   Genitourinary:  Negative for difficulty urinating, dysuria, frequency and urgency.   Musculoskeletal:  Negative for arthralgias and myalgias.   Skin:  Negative for rash.   Neurological:  Negative for dizziness, weakness, light-headedness and headaches.   Hematological:  Does not bruise/bleed easily.   Psychiatric/Behavioral:  Positive for sleep disturbance. Negative for dysphoric mood.        Objective   BP  "130/76 (BP Location: Left arm, Patient Position: Sitting, Cuff Size: Standard)   Pulse 86   Resp 16   Ht 5' 5\" (1.651 m)   Wt 71.7 kg (158 lb)   SpO2 98%   BMI 26.29 kg/m²      Physical Exam  Constitutional:       Appearance: She is well-developed.   HENT:      Head: Normocephalic and atraumatic.      Nose: Nose normal.   Eyes:      General: No scleral icterus.     Conjunctiva/sclera: Conjunctivae normal.      Pupils: Pupils are equal, round, and reactive to light.   Neck:      Thyroid: No thyromegaly.      Vascular: No JVD.      Trachea: No tracheal deviation.   Cardiovascular:      Rate and Rhythm: Normal rate and regular rhythm.      Heart sounds: No murmur heard.     No friction rub. No gallop.   Pulmonary:      Effort: Pulmonary effort is normal. No respiratory distress.      Breath sounds: Normal breath sounds. No wheezing or rales.   Abdominal:      General: Bowel sounds are normal. There is no distension.      Palpations: Abdomen is soft. There is no mass.      Tenderness: There is abdominal tenderness. There is no guarding or rebound.      Comments: Ttp R costal margin   Musculoskeletal:         General: No tenderness.      Cervical back: Normal range of motion and neck supple.   Lymphadenopathy:      Cervical: No cervical adenopathy.   Skin:     General: Skin is warm and dry.      Findings: No erythema or rash.   Neurological:      Mental Status: She is alert and oriented to person, place, and time.      Cranial Nerves: No cranial nerve deficit.   Psychiatric:         Behavior: Behavior normal.         Thought Content: Thought content normal.         Judgment: Judgment normal.         "

## 2025-03-11 DIAGNOSIS — D12.6 TUBULAR ADENOMA OF COLON: Primary | ICD-10-CM

## 2025-05-06 DIAGNOSIS — F51.01 PRIMARY INSOMNIA: ICD-10-CM

## 2025-05-07 RX ORDER — ZALEPLON 5 MG/1
5 CAPSULE ORAL
Qty: 30 CAPSULE | Refills: 0 | Status: SHIPPED | OUTPATIENT
Start: 2025-05-07

## 2025-05-12 DIAGNOSIS — J45.20 MILD INTERMITTENT ASTHMA, UNSPECIFIED WHETHER COMPLICATED: ICD-10-CM

## 2025-05-12 RX ORDER — FLUTICASONE PROPIONATE AND SALMETEROL 250; 50 UG/1; UG/1
1 POWDER RESPIRATORY (INHALATION) 2 TIMES DAILY
Qty: 180 BLISTER | Refills: 3 | Status: SHIPPED | OUTPATIENT
Start: 2025-05-12

## 2025-05-29 DIAGNOSIS — E03.9 ACQUIRED HYPOTHYROIDISM: ICD-10-CM

## 2025-05-29 DIAGNOSIS — J45.20 MILD INTERMITTENT ASTHMA, UNSPECIFIED WHETHER COMPLICATED: ICD-10-CM

## 2025-05-29 RX ORDER — MONTELUKAST SODIUM 10 MG/1
10 TABLET ORAL
Qty: 90 TABLET | Refills: 1 | Status: SHIPPED | OUTPATIENT
Start: 2025-05-29

## 2025-05-29 RX ORDER — LEVOTHYROXINE SODIUM 75 UG/1
75 TABLET ORAL DAILY
Qty: 90 TABLET | Refills: 1 | Status: SHIPPED | OUTPATIENT
Start: 2025-05-29

## 2025-08-01 ENCOUNTER — APPOINTMENT (EMERGENCY)
Dept: RADIOLOGY | Facility: HOSPITAL | Age: 60
DRG: 435 | End: 2025-08-01
Payer: COMMERCIAL

## 2025-08-01 ENCOUNTER — APPOINTMENT (EMERGENCY)
Dept: CT IMAGING | Facility: HOSPITAL | Age: 60
DRG: 435 | End: 2025-08-01
Payer: COMMERCIAL

## 2025-08-01 ENCOUNTER — HOSPITAL ENCOUNTER (INPATIENT)
Facility: HOSPITAL | Age: 60
LOS: 4 days | Discharge: HOME | DRG: 435 | End: 2025-08-05
Admitting: STUDENT IN AN ORGANIZED HEALTH CARE EDUCATION/TRAINING PROGRAM
Payer: COMMERCIAL

## 2025-08-01 ENCOUNTER — NURSE TRIAGE (OUTPATIENT)
Age: 60
End: 2025-08-01

## 2025-08-01 DIAGNOSIS — K63.89 HEPATIC FLEXURE MASS: ICD-10-CM

## 2025-08-01 DIAGNOSIS — R93.5 ABNORMAL CT OF THE ABDOMEN: ICD-10-CM

## 2025-08-01 DIAGNOSIS — D64.9 ANEMIA: ICD-10-CM

## 2025-08-01 DIAGNOSIS — C78.7 METASTASIS TO LIVER (HCC): ICD-10-CM

## 2025-08-01 DIAGNOSIS — Z85.3 HISTORY OF BREAST CANCER: ICD-10-CM

## 2025-08-01 DIAGNOSIS — R79.89 ELEVATED LFTS: ICD-10-CM

## 2025-08-01 DIAGNOSIS — C80.1 METASTASIS TO LIVER OF UNKNOWN ORIGIN (HCC): ICD-10-CM

## 2025-08-01 DIAGNOSIS — R10.9 ABDOMINAL PAIN: ICD-10-CM

## 2025-08-01 DIAGNOSIS — J45.20 MILD INTERMITTENT ASTHMA, UNSPECIFIED WHETHER COMPLICATED: ICD-10-CM

## 2025-08-01 DIAGNOSIS — C78.7 METASTASIS TO LIVER OF UNKNOWN ORIGIN (HCC): ICD-10-CM

## 2025-08-01 DIAGNOSIS — C81.90 HODGKIN LYMPHOMA (HCC): ICD-10-CM

## 2025-08-01 DIAGNOSIS — I81 PORTAL VEIN THROMBOSIS: Primary | ICD-10-CM

## 2025-08-01 DIAGNOSIS — K86.2 PANCREATIC CYST: ICD-10-CM

## 2025-08-01 DIAGNOSIS — R74.01 ELEVATED TRANSAMINASE LEVEL: ICD-10-CM

## 2025-08-01 PROBLEM — R10.11 RIGHT UPPER QUADRANT PAIN: Status: ACTIVE | Noted: 2025-08-01

## 2025-08-01 LAB
ALBUMIN SERPL BCG-MCNC: 4.2 G/DL (ref 3.5–5)
ALP SERPL-CCNC: 404 U/L (ref 34–104)
ALT SERPL W P-5'-P-CCNC: 116 U/L (ref 7–52)
ANION GAP SERPL CALCULATED.3IONS-SCNC: 6 MMOL/L (ref 4–13)
AST SERPL W P-5'-P-CCNC: 199 U/L (ref 13–39)
ATRIAL RATE: 93 BPM
BACTERIA UR QL AUTO: ABNORMAL /HPF
BASOPHILS # BLD AUTO: 0.07 THOUSANDS/ÂΜL (ref 0–0.1)
BASOPHILS NFR BLD AUTO: 1 % (ref 0–1)
BILIRUB DIRECT SERPL-MCNC: 0.32 MG/DL (ref 0–0.2)
BILIRUB SERPL-MCNC: 0.93 MG/DL (ref 0.2–1)
BILIRUB UR QL STRIP: NEGATIVE
BUN SERPL-MCNC: 11 MG/DL (ref 5–25)
CALCIUM SERPL-MCNC: 9.3 MG/DL (ref 8.4–10.2)
CHLORIDE SERPL-SCNC: 103 MMOL/L (ref 96–108)
CK SERPL-CCNC: 66 U/L (ref 26–192)
CLARITY UR: CLEAR
CO2 SERPL-SCNC: 30 MMOL/L (ref 21–32)
COLOR UR: ABNORMAL
CREAT SERPL-MCNC: 0.73 MG/DL (ref 0.6–1.3)
EOSINOPHIL # BLD AUTO: 0.29 THOUSAND/ÂΜL (ref 0–0.61)
EOSINOPHIL NFR BLD AUTO: 2 % (ref 0–6)
ERYTHROCYTE [DISTWIDTH] IN BLOOD BY AUTOMATED COUNT: 14.4 % (ref 11.6–15.1)
GFR SERPL CREATININE-BSD FRML MDRD: 90 ML/MIN/1.73SQ M
GLUCOSE SERPL-MCNC: 102 MG/DL (ref 65–140)
GLUCOSE UR STRIP-MCNC: NEGATIVE MG/DL
HCT VFR BLD AUTO: 34 % (ref 34.8–46.1)
HGB BLD-MCNC: 11.3 G/DL (ref 11.5–15.4)
HGB UR QL STRIP.AUTO: NEGATIVE
IMM GRANULOCYTES # BLD AUTO: 0.02 THOUSAND/UL (ref 0–0.2)
IMM GRANULOCYTES NFR BLD AUTO: 0 % (ref 0–2)
KETONES UR STRIP-MCNC: NEGATIVE MG/DL
LEUKOCYTE ESTERASE UR QL STRIP: ABNORMAL
LIPASE SERPL-CCNC: 8 U/L (ref 11–82)
LYMPHOCYTES # BLD AUTO: 1.55 THOUSANDS/ÂΜL (ref 0.6–4.47)
LYMPHOCYTES NFR BLD AUTO: 12 % (ref 14–44)
MCH RBC QN AUTO: 31.9 PG (ref 26.8–34.3)
MCHC RBC AUTO-ENTMCNC: 33.2 G/DL (ref 31.4–37.4)
MCV RBC AUTO: 96 FL (ref 82–98)
MONOCYTES # BLD AUTO: 1.75 THOUSAND/ÂΜL (ref 0.17–1.22)
MONOCYTES NFR BLD AUTO: 14 % (ref 4–12)
NEUTROPHILS # BLD AUTO: 8.93 THOUSANDS/ÂΜL (ref 1.85–7.62)
NEUTS SEG NFR BLD AUTO: 71 % (ref 43–75)
NITRITE UR QL STRIP: NEGATIVE
NON-SQ EPI CELLS URNS QL MICRO: ABNORMAL /HPF
NRBC BLD AUTO-RTO: 0 /100 WBCS
P AXIS: 70 DEGREES
PH UR STRIP.AUTO: 5.5 [PH]
PLATELET # BLD AUTO: 388 THOUSANDS/UL (ref 149–390)
PMV BLD AUTO: 11.2 FL (ref 8.9–12.7)
POTASSIUM SERPL-SCNC: 3.6 MMOL/L (ref 3.5–5.3)
PR INTERVAL: 188 MS
PROT SERPL-MCNC: 7.5 G/DL (ref 6.4–8.4)
PROT UR STRIP-MCNC: NEGATIVE MG/DL
QRS AXIS: -36 DEGREES
QRSD INTERVAL: 106 MS
QT INTERVAL: 396 MS
QTC INTERVAL: 492 MS
RBC # BLD AUTO: 3.54 MILLION/UL (ref 3.81–5.12)
RBC #/AREA URNS AUTO: ABNORMAL /HPF
SODIUM SERPL-SCNC: 139 MMOL/L (ref 135–147)
SP GR UR STRIP.AUTO: 1.01 (ref 1–1.03)
T WAVE AXIS: 61 DEGREES
UROBILINOGEN UR STRIP-ACNC: <2 MG/DL
VENTRICULAR RATE: 93 BPM
WBC # BLD AUTO: 12.61 THOUSAND/UL (ref 4.31–10.16)
WBC #/AREA URNS AUTO: ABNORMAL /HPF

## 2025-08-01 PROCEDURE — 81001 URINALYSIS AUTO W/SCOPE: CPT

## 2025-08-01 PROCEDURE — 83690 ASSAY OF LIPASE: CPT

## 2025-08-01 PROCEDURE — 99285 EMERGENCY DEPT VISIT HI MDM: CPT

## 2025-08-01 PROCEDURE — 36415 COLL VENOUS BLD VENIPUNCTURE: CPT

## 2025-08-01 PROCEDURE — 96361 HYDRATE IV INFUSION ADD-ON: CPT

## 2025-08-01 PROCEDURE — 82105 ALPHA-FETOPROTEIN SERUM: CPT | Performed by: STUDENT IN AN ORGANIZED HEALTH CARE EDUCATION/TRAINING PROGRAM

## 2025-08-01 PROCEDURE — 71045 X-RAY EXAM CHEST 1 VIEW: CPT

## 2025-08-01 PROCEDURE — 86301 IMMUNOASSAY TUMOR CA 19-9: CPT | Performed by: STUDENT IN AN ORGANIZED HEALTH CARE EDUCATION/TRAINING PROGRAM

## 2025-08-01 PROCEDURE — 96374 THER/PROPH/DIAG INJ IV PUSH: CPT

## 2025-08-01 PROCEDURE — 85025 COMPLETE CBC W/AUTO DIFF WBC: CPT

## 2025-08-01 PROCEDURE — 96375 TX/PRO/DX INJ NEW DRUG ADDON: CPT

## 2025-08-01 PROCEDURE — 99223 1ST HOSP IP/OBS HIGH 75: CPT | Performed by: STUDENT IN AN ORGANIZED HEALTH CARE EDUCATION/TRAINING PROGRAM

## 2025-08-01 PROCEDURE — 96376 TX/PRO/DX INJ SAME DRUG ADON: CPT

## 2025-08-01 PROCEDURE — 74177 CT ABD & PELVIS W/CONTRAST: CPT

## 2025-08-01 PROCEDURE — 82550 ASSAY OF CK (CPK): CPT

## 2025-08-01 PROCEDURE — 93005 ELECTROCARDIOGRAM TRACING: CPT

## 2025-08-01 PROCEDURE — 80048 BASIC METABOLIC PNL TOTAL CA: CPT

## 2025-08-01 PROCEDURE — 82378 CARCINOEMBRYONIC ANTIGEN: CPT | Performed by: STUDENT IN AN ORGANIZED HEALTH CARE EDUCATION/TRAINING PROGRAM

## 2025-08-01 PROCEDURE — 93010 ELECTROCARDIOGRAM REPORT: CPT | Performed by: STUDENT IN AN ORGANIZED HEALTH CARE EDUCATION/TRAINING PROGRAM

## 2025-08-01 PROCEDURE — 80076 HEPATIC FUNCTION PANEL: CPT

## 2025-08-01 RX ORDER — CARVEDILOL 12.5 MG/1
12.5 TABLET ORAL 2 TIMES DAILY
Status: DISCONTINUED | OUTPATIENT
Start: 2025-08-01 | End: 2025-08-05 | Stop reason: HOSPADM

## 2025-08-01 RX ORDER — ONDANSETRON 2 MG/ML
4 INJECTION INTRAMUSCULAR; INTRAVENOUS EVERY 8 HOURS PRN
Status: DISCONTINUED | OUTPATIENT
Start: 2025-08-01 | End: 2025-08-05 | Stop reason: HOSPADM

## 2025-08-01 RX ORDER — FUROSEMIDE 20 MG/1
20 TABLET ORAL DAILY
Status: DISCONTINUED | OUTPATIENT
Start: 2025-08-02 | End: 2025-08-05 | Stop reason: HOSPADM

## 2025-08-01 RX ORDER — FUROSEMIDE 20 MG/1
20 TABLET ORAL DAILY
Status: ON HOLD | COMMUNITY

## 2025-08-01 RX ORDER — ONDANSETRON 2 MG/ML
4 INJECTION INTRAMUSCULAR; INTRAVENOUS ONCE
Status: COMPLETED | OUTPATIENT
Start: 2025-08-01 | End: 2025-08-01

## 2025-08-01 RX ORDER — ENOXAPARIN SODIUM 100 MG/ML
1 INJECTION SUBCUTANEOUS EVERY 12 HOURS SCHEDULED
Status: DISCONTINUED | OUTPATIENT
Start: 2025-08-02 | End: 2025-08-05 | Stop reason: HOSPADM

## 2025-08-01 RX ORDER — ENOXAPARIN SODIUM 100 MG/ML
1 INJECTION SUBCUTANEOUS ONCE
Status: COMPLETED | OUTPATIENT
Start: 2025-08-01 | End: 2025-08-01

## 2025-08-01 RX ORDER — ALBUTEROL SULFATE 90 UG/1
2 INHALANT RESPIRATORY (INHALATION) EVERY 4 HOURS PRN
Status: DISCONTINUED | OUTPATIENT
Start: 2025-08-01 | End: 2025-08-05 | Stop reason: HOSPADM

## 2025-08-01 RX ORDER — MORPHINE SULFATE 4 MG/ML
4 INJECTION, SOLUTION INTRAMUSCULAR; INTRAVENOUS ONCE
Status: COMPLETED | OUTPATIENT
Start: 2025-08-01 | End: 2025-08-01

## 2025-08-01 RX ORDER — FLUTICASONE FUROATE AND VILANTEROL 200; 25 UG/1; UG/1
1 POWDER RESPIRATORY (INHALATION) DAILY
Status: DISCONTINUED | OUTPATIENT
Start: 2025-08-02 | End: 2025-08-05 | Stop reason: HOSPADM

## 2025-08-01 RX ORDER — LANOLIN ALCOHOL/MO/W.PET/CERES
400 CREAM (GRAM) TOPICAL DAILY
Status: DISCONTINUED | OUTPATIENT
Start: 2025-08-02 | End: 2025-08-05 | Stop reason: HOSPADM

## 2025-08-01 RX ORDER — LEVOTHYROXINE SODIUM 75 UG/1
75 TABLET ORAL DAILY
Status: DISCONTINUED | OUTPATIENT
Start: 2025-08-02 | End: 2025-08-05 | Stop reason: HOSPADM

## 2025-08-01 RX ORDER — MONTELUKAST SODIUM 10 MG/1
10 TABLET ORAL
Status: DISCONTINUED | OUTPATIENT
Start: 2025-08-01 | End: 2025-08-05 | Stop reason: HOSPADM

## 2025-08-01 RX ADMIN — ONDANSETRON 4 MG: 2 INJECTION INTRAMUSCULAR; INTRAVENOUS at 17:14

## 2025-08-01 RX ADMIN — ENOXAPARIN SODIUM 70 MG: 80 INJECTION SUBCUTANEOUS at 20:47

## 2025-08-01 RX ADMIN — IOHEXOL 100 ML: 350 INJECTION, SOLUTION INTRAVENOUS at 18:32

## 2025-08-01 RX ADMIN — MORPHINE SULFATE 4 MG: 4 INJECTION INTRAVENOUS at 17:14

## 2025-08-01 RX ADMIN — SODIUM CHLORIDE 500 ML: 0.9 INJECTION, SOLUTION INTRAVENOUS at 17:13

## 2025-08-01 RX ADMIN — MORPHINE SULFATE 4 MG: 4 INJECTION INTRAVENOUS at 18:47

## 2025-08-01 RX ADMIN — MONTELUKAST 10 MG: 10 TABLET, FILM COATED ORAL at 23:17

## 2025-08-02 LAB
AFP-TM SERPL-MCNC: 2.28 NG/ML (ref 0–9)
CEA SERPL-MCNC: 1.4 NG/ML (ref 0–3)

## 2025-08-02 PROCEDURE — 99222 1ST HOSP IP/OBS MODERATE 55: CPT | Performed by: INTERNAL MEDICINE

## 2025-08-02 PROCEDURE — 99232 SBSQ HOSP IP/OBS MODERATE 35: CPT | Performed by: INTERNAL MEDICINE

## 2025-08-02 PROCEDURE — NC001 PR NO CHARGE: Performed by: STUDENT IN AN ORGANIZED HEALTH CARE EDUCATION/TRAINING PROGRAM

## 2025-08-02 RX ORDER — ACETAMINOPHEN 325 MG/1
650 TABLET ORAL EVERY 6 HOURS PRN
Status: DISCONTINUED | OUTPATIENT
Start: 2025-08-02 | End: 2025-08-05 | Stop reason: HOSPADM

## 2025-08-02 RX ADMIN — MORPHINE SULFATE 2 MG: 2 INJECTION, SOLUTION INTRAMUSCULAR; INTRAVENOUS at 22:02

## 2025-08-02 RX ADMIN — MORPHINE SULFATE 2 MG: 2 INJECTION, SOLUTION INTRAMUSCULAR; INTRAVENOUS at 13:18

## 2025-08-02 RX ADMIN — CARVEDILOL 12.5 MG: 12.5 TABLET, FILM COATED ORAL at 17:01

## 2025-08-02 RX ADMIN — ENOXAPARIN SODIUM 70 MG: 80 INJECTION SUBCUTANEOUS at 22:02

## 2025-08-02 RX ADMIN — CARVEDILOL 12.5 MG: 12.5 TABLET, FILM COATED ORAL at 08:59

## 2025-08-02 RX ADMIN — ACETAMINOPHEN 650 MG: 325 TABLET ORAL at 22:15

## 2025-08-02 RX ADMIN — MONTELUKAST 10 MG: 10 TABLET, FILM COATED ORAL at 22:02

## 2025-08-02 RX ADMIN — MORPHINE SULFATE 2 MG: 2 INJECTION, SOLUTION INTRAMUSCULAR; INTRAVENOUS at 07:29

## 2025-08-02 RX ADMIN — MORPHINE SULFATE 2 MG: 2 INJECTION, SOLUTION INTRAMUSCULAR; INTRAVENOUS at 17:48

## 2025-08-02 RX ADMIN — Medication 400 MG: at 08:54

## 2025-08-02 RX ADMIN — MORPHINE SULFATE 2 MG: 2 INJECTION, SOLUTION INTRAMUSCULAR; INTRAVENOUS at 01:53

## 2025-08-02 RX ADMIN — FLUTICASONE FUROATE AND VILANTEROL TRIFENATATE 1 PUFF: 200; 25 POWDER RESPIRATORY (INHALATION) at 08:55

## 2025-08-02 RX ADMIN — ACETAMINOPHEN 650 MG: 325 TABLET ORAL at 09:37

## 2025-08-02 RX ADMIN — LEVOTHYROXINE SODIUM 75 MCG: 0.07 TABLET ORAL at 08:54

## 2025-08-02 RX ADMIN — FUROSEMIDE 20 MG: 20 TABLET ORAL at 08:58

## 2025-08-02 RX ADMIN — ENOXAPARIN SODIUM 70 MG: 80 INJECTION SUBCUTANEOUS at 08:54

## 2025-08-03 LAB
ANION GAP SERPL CALCULATED.3IONS-SCNC: 5 MMOL/L (ref 4–13)
BUN SERPL-MCNC: 8 MG/DL (ref 5–25)
CALCIUM SERPL-MCNC: 8.5 MG/DL (ref 8.4–10.2)
CANCER AG19-9 SERPL-ACNC: 906 U/ML (ref 0–35)
CHLORIDE SERPL-SCNC: 104 MMOL/L (ref 96–108)
CO2 SERPL-SCNC: 29 MMOL/L (ref 21–32)
CREAT SERPL-MCNC: 0.61 MG/DL (ref 0.6–1.3)
ERYTHROCYTE [DISTWIDTH] IN BLOOD BY AUTOMATED COUNT: 14.4 % (ref 11.6–15.1)
GFR SERPL CREATININE-BSD FRML MDRD: 99 ML/MIN/1.73SQ M
GLUCOSE SERPL-MCNC: 91 MG/DL (ref 65–140)
HCT VFR BLD AUTO: 31.3 % (ref 34.8–46.1)
HGB BLD-MCNC: 10 G/DL (ref 11.5–15.4)
INR PPP: 1.13 (ref 0.85–1.19)
MCH RBC QN AUTO: 30.7 PG (ref 26.8–34.3)
MCHC RBC AUTO-ENTMCNC: 31.9 G/DL (ref 31.4–37.4)
MCV RBC AUTO: 96 FL (ref 82–98)
PLATELET # BLD AUTO: 352 THOUSANDS/UL (ref 149–390)
PMV BLD AUTO: 11.4 FL (ref 8.9–12.7)
POTASSIUM SERPL-SCNC: 3.6 MMOL/L (ref 3.5–5.3)
PROTHROMBIN TIME: 15.1 SECONDS (ref 12.3–15)
RBC # BLD AUTO: 3.26 MILLION/UL (ref 3.81–5.12)
SODIUM SERPL-SCNC: 138 MMOL/L (ref 135–147)
WBC # BLD AUTO: 11.69 THOUSAND/UL (ref 4.31–10.16)

## 2025-08-03 PROCEDURE — 85007 BL SMEAR W/DIFF WBC COUNT: CPT | Performed by: INTERNAL MEDICINE

## 2025-08-03 PROCEDURE — 80048 BASIC METABOLIC PNL TOTAL CA: CPT | Performed by: INTERNAL MEDICINE

## 2025-08-03 PROCEDURE — 85027 COMPLETE CBC AUTOMATED: CPT | Performed by: INTERNAL MEDICINE

## 2025-08-03 PROCEDURE — 99232 SBSQ HOSP IP/OBS MODERATE 35: CPT | Performed by: INTERNAL MEDICINE

## 2025-08-03 PROCEDURE — 85610 PROTHROMBIN TIME: CPT | Performed by: STUDENT IN AN ORGANIZED HEALTH CARE EDUCATION/TRAINING PROGRAM

## 2025-08-03 RX ORDER — POLYETHYLENE GLYCOL 3350 17 G/17G
238 POWDER, FOR SOLUTION ORAL ONCE
Status: COMPLETED | OUTPATIENT
Start: 2025-08-03 | End: 2025-08-03

## 2025-08-03 RX ADMIN — MORPHINE SULFATE 2 MG: 2 INJECTION, SOLUTION INTRAMUSCULAR; INTRAVENOUS at 08:31

## 2025-08-03 RX ADMIN — CARVEDILOL 12.5 MG: 12.5 TABLET, FILM COATED ORAL at 18:04

## 2025-08-03 RX ADMIN — FLUTICASONE FUROATE AND VILANTEROL TRIFENATATE 1 PUFF: 200; 25 POWDER RESPIRATORY (INHALATION) at 09:16

## 2025-08-03 RX ADMIN — POLYETHYLENE GLYCOL 3350 238 G: 17 POWDER, FOR SOLUTION ORAL at 15:37

## 2025-08-03 RX ADMIN — MORPHINE SULFATE 2 MG: 2 INJECTION, SOLUTION INTRAMUSCULAR; INTRAVENOUS at 14:40

## 2025-08-03 RX ADMIN — Medication 400 MG: at 09:17

## 2025-08-03 RX ADMIN — LEVOTHYROXINE SODIUM 75 MCG: 0.07 TABLET ORAL at 09:17

## 2025-08-04 ENCOUNTER — ANESTHESIA EVENT (INPATIENT)
Dept: GASTROENTEROLOGY | Facility: HOSPITAL | Age: 60
DRG: 435 | End: 2025-08-04
Payer: COMMERCIAL

## 2025-08-04 ENCOUNTER — APPOINTMENT (INPATIENT)
Dept: GASTROENTEROLOGY | Facility: HOSPITAL | Age: 60
DRG: 435 | End: 2025-08-04
Attending: PHYSICIAN ASSISTANT
Payer: COMMERCIAL

## 2025-08-04 ENCOUNTER — ANESTHESIA (INPATIENT)
Dept: GASTROENTEROLOGY | Facility: HOSPITAL | Age: 60
DRG: 435 | End: 2025-08-04
Payer: COMMERCIAL

## 2025-08-04 LAB
ALBUMIN SERPL BCG-MCNC: 3.6 G/DL (ref 3.5–5)
ALP SERPL-CCNC: 313 U/L (ref 34–104)
ALT SERPL W P-5'-P-CCNC: 60 U/L (ref 7–52)
ANION GAP SERPL CALCULATED.3IONS-SCNC: 7 MMOL/L (ref 4–13)
AST SERPL W P-5'-P-CCNC: 81 U/L (ref 13–39)
BASOPHILS # BLD AUTO: 0.07 THOUSANDS/ÂΜL (ref 0–0.1)
BASOPHILS NFR BLD AUTO: 1 % (ref 0–1)
BILIRUB DIRECT SERPL-MCNC: 0.33 MG/DL (ref 0–0.2)
BILIRUB SERPL-MCNC: 1.09 MG/DL (ref 0.2–1)
BUN SERPL-MCNC: 5 MG/DL (ref 5–25)
CALCIUM SERPL-MCNC: 8.7 MG/DL (ref 8.4–10.2)
CHLORIDE SERPL-SCNC: 104 MMOL/L (ref 96–108)
CO2 SERPL-SCNC: 27 MMOL/L (ref 21–32)
CREAT SERPL-MCNC: 0.6 MG/DL (ref 0.6–1.3)
EOSINOPHIL # BLD AUTO: 0.31 THOUSAND/ÂΜL (ref 0–0.61)
EOSINOPHIL NFR BLD AUTO: 3 % (ref 0–6)
ERYTHROCYTE [DISTWIDTH] IN BLOOD BY AUTOMATED COUNT: 14.1 % (ref 11.6–15.1)
GFR SERPL CREATININE-BSD FRML MDRD: 100 ML/MIN/1.73SQ M
GLUCOSE SERPL-MCNC: 93 MG/DL (ref 65–140)
HCT VFR BLD AUTO: 29.6 % (ref 34.8–46.1)
HGB BLD-MCNC: 9.9 G/DL (ref 11.5–15.4)
IMM GRANULOCYTES # BLD AUTO: 0.03 THOUSAND/UL (ref 0–0.2)
IMM GRANULOCYTES NFR BLD AUTO: 0 % (ref 0–2)
LYMPHOCYTES # BLD AUTO: 1.51 THOUSANDS/ÂΜL (ref 0.6–4.47)
LYMPHOCYTES NFR BLD AUTO: 14 % (ref 14–44)
MCH RBC QN AUTO: 31.5 PG (ref 26.8–34.3)
MCHC RBC AUTO-ENTMCNC: 33.4 G/DL (ref 31.4–37.4)
MCV RBC AUTO: 94 FL (ref 82–98)
MONOCYTES # BLD AUTO: 1.73 THOUSAND/ÂΜL (ref 0.17–1.22)
MONOCYTES NFR BLD AUTO: 16 % (ref 4–12)
NEUTROPHILS # BLD AUTO: 7.25 THOUSANDS/ÂΜL (ref 1.85–7.62)
NEUTS SEG NFR BLD AUTO: 66 % (ref 43–75)
NRBC BLD AUTO-RTO: 0 /100 WBCS
PLATELET # BLD AUTO: 383 THOUSANDS/UL (ref 149–390)
PMV BLD AUTO: 11.1 FL (ref 8.9–12.7)
POTASSIUM SERPL-SCNC: 3.3 MMOL/L (ref 3.5–5.3)
PROT SERPL-MCNC: 6.4 G/DL (ref 6.4–8.4)
RBC # BLD AUTO: 3.14 MILLION/UL (ref 3.81–5.12)
SODIUM SERPL-SCNC: 138 MMOL/L (ref 135–147)
WBC # BLD AUTO: 10.9 THOUSAND/UL (ref 4.31–10.16)

## 2025-08-04 PROCEDURE — 43239 EGD BIOPSY SINGLE/MULTIPLE: CPT | Performed by: INTERNAL MEDICINE

## 2025-08-04 PROCEDURE — 80048 BASIC METABOLIC PNL TOTAL CA: CPT | Performed by: INTERNAL MEDICINE

## 2025-08-04 PROCEDURE — 80076 HEPATIC FUNCTION PANEL: CPT | Performed by: INTERNAL MEDICINE

## 2025-08-04 PROCEDURE — 99232 SBSQ HOSP IP/OBS MODERATE 35: CPT | Performed by: INTERNAL MEDICINE

## 2025-08-04 PROCEDURE — 88305 TISSUE EXAM BY PATHOLOGIST: CPT | Performed by: PATHOLOGY

## 2025-08-04 PROCEDURE — 0DJDXZZ INSPECTION OF LOWER INTESTINAL TRACT, EXTERNAL APPROACH: ICD-10-PCS | Performed by: INTERNAL MEDICINE

## 2025-08-04 PROCEDURE — 0DBL8ZX EXCISION OF TRANSVERSE COLON, VIA NATURAL OR ARTIFICIAL OPENING ENDOSCOPIC, DIAGNOSTIC: ICD-10-PCS | Performed by: INTERNAL MEDICINE

## 2025-08-04 PROCEDURE — 0DBK8ZX EXCISION OF ASCENDING COLON, VIA NATURAL OR ARTIFICIAL OPENING ENDOSCOPIC, DIAGNOSTIC: ICD-10-PCS | Performed by: INTERNAL MEDICINE

## 2025-08-04 PROCEDURE — 85025 COMPLETE CBC W/AUTO DIFF WBC: CPT | Performed by: INTERNAL MEDICINE

## 2025-08-04 PROCEDURE — 45385 COLONOSCOPY W/LESION REMOVAL: CPT | Performed by: INTERNAL MEDICINE

## 2025-08-04 PROCEDURE — 0DB68ZX EXCISION OF STOMACH, VIA NATURAL OR ARTIFICIAL OPENING ENDOSCOPIC, DIAGNOSTIC: ICD-10-PCS | Performed by: INTERNAL MEDICINE

## 2025-08-04 RX ORDER — PROPOFOL 10 MG/ML
INJECTION, EMULSION INTRAVENOUS AS NEEDED
Status: DISCONTINUED | OUTPATIENT
Start: 2025-08-04 | End: 2025-08-04

## 2025-08-04 RX ORDER — LIDOCAINE HYDROCHLORIDE 20 MG/ML
INJECTION, SOLUTION EPIDURAL; INFILTRATION; INTRACAUDAL; PERINEURAL AS NEEDED
Status: DISCONTINUED | OUTPATIENT
Start: 2025-08-04 | End: 2025-08-04

## 2025-08-04 RX ORDER — SODIUM CHLORIDE, SODIUM LACTATE, POTASSIUM CHLORIDE, CALCIUM CHLORIDE 600; 310; 30; 20 MG/100ML; MG/100ML; MG/100ML; MG/100ML
INJECTION, SOLUTION INTRAVENOUS CONTINUOUS PRN
Status: DISCONTINUED | OUTPATIENT
Start: 2025-08-04 | End: 2025-08-04

## 2025-08-04 RX ORDER — SODIUM CHLORIDE, SODIUM LACTATE, POTASSIUM CHLORIDE, CALCIUM CHLORIDE 600; 310; 30; 20 MG/100ML; MG/100ML; MG/100ML; MG/100ML
125 INJECTION, SOLUTION INTRAVENOUS CONTINUOUS
Status: DISCONTINUED | OUTPATIENT
Start: 2025-08-04 | End: 2025-08-05 | Stop reason: HOSPADM

## 2025-08-04 RX ADMIN — MORPHINE SULFATE 2 MG: 2 INJECTION, SOLUTION INTRAMUSCULAR; INTRAVENOUS at 06:00

## 2025-08-04 RX ADMIN — Medication 400 MG: at 09:30

## 2025-08-04 RX ADMIN — MORPHINE SULFATE 2 MG: 2 INJECTION, SOLUTION INTRAMUSCULAR; INTRAVENOUS at 17:52

## 2025-08-04 RX ADMIN — PROPOFOL 50 MG: 10 INJECTION, EMULSION INTRAVENOUS at 11:41

## 2025-08-04 RX ADMIN — FUROSEMIDE 20 MG: 20 TABLET ORAL at 09:30

## 2025-08-04 RX ADMIN — SODIUM CHLORIDE, SODIUM LACTATE, POTASSIUM CHLORIDE, AND CALCIUM CHLORIDE 125 ML/HR: .6; .31; .03; .02 INJECTION, SOLUTION INTRAVENOUS at 13:41

## 2025-08-04 RX ADMIN — CARVEDILOL 12.5 MG: 12.5 TABLET, FILM COATED ORAL at 09:30

## 2025-08-04 RX ADMIN — PROPOFOL 50 MG: 10 INJECTION, EMULSION INTRAVENOUS at 11:48

## 2025-08-04 RX ADMIN — PROPOFOL 50 MG: 10 INJECTION, EMULSION INTRAVENOUS at 11:52

## 2025-08-04 RX ADMIN — SODIUM CHLORIDE, SODIUM LACTATE, POTASSIUM CHLORIDE, AND CALCIUM CHLORIDE: .6; .31; .03; .02 INJECTION, SOLUTION INTRAVENOUS at 11:29

## 2025-08-04 RX ADMIN — PROPOFOL 100 MG: 10 INJECTION, EMULSION INTRAVENOUS at 11:29

## 2025-08-04 RX ADMIN — CARVEDILOL 12.5 MG: 12.5 TABLET, FILM COATED ORAL at 17:05

## 2025-08-04 RX ADMIN — FLUTICASONE FUROATE AND VILANTEROL TRIFENATATE 1 PUFF: 200; 25 POWDER RESPIRATORY (INHALATION) at 09:34

## 2025-08-04 RX ADMIN — LIDOCAINE HYDROCHLORIDE 100 MG: 20 INJECTION, SOLUTION EPIDURAL; INFILTRATION; INTRACAUDAL; PERINEURAL at 11:29

## 2025-08-04 RX ADMIN — MONTELUKAST 10 MG: 10 TABLET, FILM COATED ORAL at 23:32

## 2025-08-04 RX ADMIN — MORPHINE SULFATE 2 MG: 2 INJECTION, SOLUTION INTRAMUSCULAR; INTRAVENOUS at 13:37

## 2025-08-04 RX ADMIN — PROPOFOL 50 MG: 10 INJECTION, EMULSION INTRAVENOUS at 11:31

## 2025-08-04 RX ADMIN — PROPOFOL 50 MG: 10 INJECTION, EMULSION INTRAVENOUS at 11:34

## 2025-08-04 RX ADMIN — PROPOFOL 50 MG: 10 INJECTION, EMULSION INTRAVENOUS at 11:44

## 2025-08-04 RX ADMIN — LEVOTHYROXINE SODIUM 75 MCG: 0.07 TABLET ORAL at 09:30

## 2025-08-04 RX ADMIN — PROPOFOL 50 MG: 10 INJECTION, EMULSION INTRAVENOUS at 11:30

## 2025-08-04 RX ADMIN — PROPOFOL 50 MG: 10 INJECTION, EMULSION INTRAVENOUS at 11:38

## 2025-08-05 ENCOUNTER — NURSE TRIAGE (OUTPATIENT)
Dept: OTHER | Facility: OTHER | Age: 60
End: 2025-08-05

## 2025-08-05 ENCOUNTER — TELEPHONE (OUTPATIENT)
Dept: HEMATOLOGY ONCOLOGY | Facility: CLINIC | Age: 60
End: 2025-08-05

## 2025-08-05 ENCOUNTER — APPOINTMENT (INPATIENT)
Dept: NON INVASIVE DIAGNOSTICS | Facility: HOSPITAL | Age: 60
DRG: 435 | End: 2025-08-05
Attending: STUDENT IN AN ORGANIZED HEALTH CARE EDUCATION/TRAINING PROGRAM
Payer: COMMERCIAL

## 2025-08-05 VITALS
OXYGEN SATURATION: 93 % | WEIGHT: 161.82 LBS | BODY MASS INDEX: 26.01 KG/M2 | SYSTOLIC BLOOD PRESSURE: 114 MMHG | TEMPERATURE: 98.6 F | HEIGHT: 66 IN | DIASTOLIC BLOOD PRESSURE: 68 MMHG | HEART RATE: 95 BPM | RESPIRATION RATE: 20 BRPM

## 2025-08-05 DIAGNOSIS — I81 PORTAL VEIN THROMBOSIS: ICD-10-CM

## 2025-08-05 LAB
ANION GAP SERPL CALCULATED.3IONS-SCNC: 6 MMOL/L (ref 4–13)
BASOPHILS # BLD AUTO: 0.09 THOUSANDS/ÂΜL (ref 0–0.1)
BASOPHILS NFR BLD AUTO: 1 % (ref 0–1)
BUN SERPL-MCNC: 6 MG/DL (ref 5–25)
CALCIUM SERPL-MCNC: 8.4 MG/DL (ref 8.4–10.2)
CHLORIDE SERPL-SCNC: 106 MMOL/L (ref 96–108)
CO2 SERPL-SCNC: 28 MMOL/L (ref 21–32)
CREAT SERPL-MCNC: 0.61 MG/DL (ref 0.6–1.3)
EOSINOPHIL # BLD AUTO: 0.4 THOUSAND/ÂΜL (ref 0–0.61)
EOSINOPHIL NFR BLD AUTO: 4 % (ref 0–6)
ERYTHROCYTE [DISTWIDTH] IN BLOOD BY AUTOMATED COUNT: 14 % (ref 11.6–15.1)
GFR SERPL CREATININE-BSD FRML MDRD: 99 ML/MIN/1.73SQ M
GLUCOSE SERPL-MCNC: 87 MG/DL (ref 65–140)
HCT VFR BLD AUTO: 30.1 % (ref 34.8–46.1)
HGB BLD-MCNC: 9.8 G/DL (ref 11.5–15.4)
IMM GRANULOCYTES # BLD AUTO: 0.04 THOUSAND/UL (ref 0–0.2)
IMM GRANULOCYTES NFR BLD AUTO: 0 % (ref 0–2)
LYMPHOCYTES # BLD AUTO: 1.8 THOUSANDS/ÂΜL (ref 0.6–4.47)
LYMPHOCYTES NFR BLD AUTO: 19 % (ref 14–44)
MCH RBC QN AUTO: 31 PG (ref 26.8–34.3)
MCHC RBC AUTO-ENTMCNC: 32.6 G/DL (ref 31.4–37.4)
MCV RBC AUTO: 95 FL (ref 82–98)
MONOCYTES # BLD AUTO: 1.54 THOUSAND/ÂΜL (ref 0.17–1.22)
MONOCYTES NFR BLD AUTO: 16 % (ref 4–12)
NEUTROPHILS # BLD AUTO: 5.55 THOUSANDS/ÂΜL (ref 1.85–7.62)
NEUTS SEG NFR BLD AUTO: 60 % (ref 43–75)
NRBC BLD AUTO-RTO: 0 /100 WBCS
PLATELET # BLD AUTO: 385 THOUSANDS/UL (ref 149–390)
PMV BLD AUTO: 11.6 FL (ref 8.9–12.7)
POTASSIUM SERPL-SCNC: 3.4 MMOL/L (ref 3.5–5.3)
RBC # BLD AUTO: 3.16 MILLION/UL (ref 3.81–5.12)
SODIUM SERPL-SCNC: 140 MMOL/L (ref 135–147)
WBC # BLD AUTO: 9.42 THOUSAND/UL (ref 4.31–10.16)

## 2025-08-05 PROCEDURE — 88360 TUMOR IMMUNOHISTOCHEM/MANUAL: CPT | Performed by: PATHOLOGY

## 2025-08-05 PROCEDURE — 76942 ECHO GUIDE FOR BIOPSY: CPT

## 2025-08-05 PROCEDURE — 99239 HOSP IP/OBS DSCHRG MGMT >30: CPT | Performed by: INTERNAL MEDICINE

## 2025-08-05 PROCEDURE — 88307 TISSUE EXAM BY PATHOLOGIST: CPT | Performed by: PATHOLOGY

## 2025-08-05 PROCEDURE — 80048 BASIC METABOLIC PNL TOTAL CA: CPT | Performed by: INTERNAL MEDICINE

## 2025-08-05 PROCEDURE — 93005 ELECTROCARDIOGRAM TRACING: CPT

## 2025-08-05 PROCEDURE — 76942 ECHO GUIDE FOR BIOPSY: CPT | Performed by: RADIOLOGY

## 2025-08-05 PROCEDURE — 99152 MOD SED SAME PHYS/QHP 5/>YRS: CPT | Performed by: RADIOLOGY

## 2025-08-05 PROCEDURE — 99152 MOD SED SAME PHYS/QHP 5/>YRS: CPT

## 2025-08-05 PROCEDURE — 85025 COMPLETE CBC W/AUTO DIFF WBC: CPT | Performed by: INTERNAL MEDICINE

## 2025-08-05 PROCEDURE — 0FB13ZX EXCISION OF RIGHT LOBE LIVER, PERCUTANEOUS APPROACH, DIAGNOSTIC: ICD-10-PCS | Performed by: STUDENT IN AN ORGANIZED HEALTH CARE EDUCATION/TRAINING PROGRAM

## 2025-08-05 PROCEDURE — 88341 IMHCHEM/IMCYTCHM EA ADD ANTB: CPT | Performed by: PATHOLOGY

## 2025-08-05 PROCEDURE — 47000 NEEDLE BIOPSY OF LIVER PERQ: CPT

## 2025-08-05 PROCEDURE — 99284 EMERGENCY DEPT VISIT MOD MDM: CPT

## 2025-08-05 PROCEDURE — 88342 IMHCHEM/IMCYTCHM 1ST ANTB: CPT | Performed by: PATHOLOGY

## 2025-08-05 PROCEDURE — 47000 NEEDLE BIOPSY OF LIVER PERQ: CPT | Performed by: RADIOLOGY

## 2025-08-05 RX ORDER — MIDAZOLAM HYDROCHLORIDE 2 MG/2ML
INJECTION, SOLUTION INTRAMUSCULAR; INTRAVENOUS AS NEEDED
Status: COMPLETED | OUTPATIENT
Start: 2025-08-05 | End: 2025-08-05

## 2025-08-05 RX ORDER — LIDOCAINE WITH 8.4% SOD BICARB 0.9%(10ML)
SYRINGE (ML) INJECTION AS NEEDED
Status: COMPLETED | OUTPATIENT
Start: 2025-08-05 | End: 2025-08-05

## 2025-08-05 RX ORDER — FENTANYL CITRATE 50 UG/ML
INJECTION, SOLUTION INTRAMUSCULAR; INTRAVENOUS AS NEEDED
Status: COMPLETED | OUTPATIENT
Start: 2025-08-05 | End: 2025-08-05

## 2025-08-05 RX ORDER — POTASSIUM CHLORIDE 1500 MG/1
20 TABLET, EXTENDED RELEASE ORAL ONCE
Status: COMPLETED | OUTPATIENT
Start: 2025-08-05 | End: 2025-08-05

## 2025-08-05 RX ORDER — ALBUTEROL SULFATE 90 UG/1
2 INHALANT RESPIRATORY (INHALATION) EVERY 6 HOURS PRN
Qty: 6.7 G | Refills: 0 | Status: ON HOLD | OUTPATIENT
Start: 2025-08-05

## 2025-08-05 RX ORDER — HYDROCODONE BITARTRATE AND ACETAMINOPHEN 5; 325 MG/1; MG/1
1 TABLET ORAL EVERY 6 HOURS PRN
Qty: 30 TABLET | Refills: 0 | Status: SHIPPED | OUTPATIENT
Start: 2025-08-05 | End: 2025-08-06

## 2025-08-05 RX ADMIN — Medication 400 MG: at 08:03

## 2025-08-05 RX ADMIN — MIDAZOLAM HYDROCHLORIDE 1 MG: 1 INJECTION, SOLUTION INTRAMUSCULAR; INTRAVENOUS at 10:15

## 2025-08-05 RX ADMIN — FLUTICASONE FUROATE AND VILANTEROL TRIFENATATE 1 PUFF: 200; 25 POWDER RESPIRATORY (INHALATION) at 08:08

## 2025-08-05 RX ADMIN — LEVOTHYROXINE SODIUM 75 MCG: 0.07 TABLET ORAL at 08:03

## 2025-08-05 RX ADMIN — Medication 10 ML: at 10:22

## 2025-08-05 RX ADMIN — FENTANYL CITRATE 50 MCG: 50 INJECTION, SOLUTION INTRAMUSCULAR; INTRAVENOUS at 10:15

## 2025-08-05 RX ADMIN — MORPHINE SULFATE 2 MG: 2 INJECTION, SOLUTION INTRAMUSCULAR; INTRAVENOUS at 12:35

## 2025-08-05 RX ADMIN — MORPHINE SULFATE 2 MG: 2 INJECTION, SOLUTION INTRAMUSCULAR; INTRAVENOUS at 01:46

## 2025-08-05 RX ADMIN — FENTANYL CITRATE 25 MCG: 50 INJECTION, SOLUTION INTRAMUSCULAR; INTRAVENOUS at 10:23

## 2025-08-05 RX ADMIN — MIDAZOLAM HYDROCHLORIDE 0.5 MG: 1 INJECTION, SOLUTION INTRAMUSCULAR; INTRAVENOUS at 10:23

## 2025-08-05 RX ADMIN — MORPHINE SULFATE 2 MG: 2 INJECTION, SOLUTION INTRAMUSCULAR; INTRAVENOUS at 08:03

## 2025-08-05 RX ADMIN — POTASSIUM CHLORIDE 20 MEQ: 1500 TABLET, EXTENDED RELEASE ORAL at 08:03

## 2025-08-06 ENCOUNTER — APPOINTMENT (EMERGENCY)
Dept: CT IMAGING | Facility: HOSPITAL | Age: 60
End: 2025-08-06
Payer: COMMERCIAL

## 2025-08-06 ENCOUNTER — PATIENT OUTREACH (OUTPATIENT)
Dept: CASE MANAGEMENT | Facility: OTHER | Age: 60
End: 2025-08-06

## 2025-08-06 ENCOUNTER — TELEMEDICINE (OUTPATIENT)
Dept: INTERVENTIONAL RADIOLOGY/VASCULAR | Facility: CLINIC | Age: 60
End: 2025-08-06
Payer: COMMERCIAL

## 2025-08-06 ENCOUNTER — TRANSITIONAL CARE MANAGEMENT (OUTPATIENT)
Age: 60
End: 2025-08-06

## 2025-08-06 ENCOUNTER — HOSPITAL ENCOUNTER (EMERGENCY)
Facility: HOSPITAL | Age: 60
Discharge: HOME | End: 2025-08-06
Attending: EMERGENCY MEDICINE | Admitting: EMERGENCY MEDICINE
Payer: COMMERCIAL

## 2025-08-06 ENCOUNTER — OFFICE VISIT (OUTPATIENT)
Age: 60
End: 2025-08-06
Payer: COMMERCIAL

## 2025-08-06 VITALS
RESPIRATION RATE: 18 BRPM | TEMPERATURE: 98.5 F | SYSTOLIC BLOOD PRESSURE: 118 MMHG | OXYGEN SATURATION: 97 % | HEART RATE: 86 BPM | DIASTOLIC BLOOD PRESSURE: 63 MMHG

## 2025-08-06 VITALS
HEART RATE: 97 BPM | SYSTOLIC BLOOD PRESSURE: 124 MMHG | OXYGEN SATURATION: 90 % | BODY MASS INDEX: 25.71 KG/M2 | DIASTOLIC BLOOD PRESSURE: 70 MMHG | WEIGHT: 160 LBS | HEIGHT: 66 IN

## 2025-08-06 DIAGNOSIS — C78.7 METASTASIS TO LIVER OF UNKNOWN ORIGIN (HCC): Primary | ICD-10-CM

## 2025-08-06 DIAGNOSIS — I42.7 CARDIOMYOPATHY DUE TO ANTHRACYCLINE (HCC): ICD-10-CM

## 2025-08-06 DIAGNOSIS — C80.1 METASTASIS TO LIVER OF UNKNOWN ORIGIN (HCC): ICD-10-CM

## 2025-08-06 DIAGNOSIS — J96.21 ACUTE ON CHRONIC RESPIRATORY FAILURE WITH HYPOXIA (HCC): ICD-10-CM

## 2025-08-06 DIAGNOSIS — I81 PORTAL VEIN THROMBOSIS: ICD-10-CM

## 2025-08-06 DIAGNOSIS — C78.7 METASTASIS TO LIVER OF UNKNOWN ORIGIN (HCC): ICD-10-CM

## 2025-08-06 DIAGNOSIS — T45.1X5A CARDIOMYOPATHY DUE TO ANTHRACYCLINE (HCC): ICD-10-CM

## 2025-08-06 DIAGNOSIS — I81 PORTAL VEIN THROMBOSIS: Primary | ICD-10-CM

## 2025-08-06 DIAGNOSIS — Z51.5 PALLIATIVE CARE BY SPECIALIST: ICD-10-CM

## 2025-08-06 DIAGNOSIS — R10.9 ABDOMINAL PAIN: Primary | ICD-10-CM

## 2025-08-06 DIAGNOSIS — R50.9 FEVER: ICD-10-CM

## 2025-08-06 DIAGNOSIS — C78.7 CANCER, METASTATIC TO LIVER (HCC): ICD-10-CM

## 2025-08-06 DIAGNOSIS — C80.1 METASTASIS TO LIVER OF UNKNOWN ORIGIN (HCC): Primary | ICD-10-CM

## 2025-08-06 DIAGNOSIS — R06.00 DYSPNEA: ICD-10-CM

## 2025-08-06 LAB
2HR DELTA HS TROPONIN: 0 NG/L
ALBUMIN SERPL BCG-MCNC: 3.9 G/DL (ref 3.5–5)
ALP SERPL-CCNC: 390 U/L (ref 34–104)
ALT SERPL W P-5'-P-CCNC: 65 U/L (ref 7–52)
ANION GAP SERPL CALCULATED.3IONS-SCNC: 9 MMOL/L (ref 4–13)
APTT PPP: 36 SECONDS (ref 23–34)
AST SERPL W P-5'-P-CCNC: 71 U/L (ref 13–39)
ATRIAL RATE: 89 BPM
BASE EX.OXY STD BLDV CALC-SCNC: 55.2 % (ref 60–80)
BASE EXCESS BLDV CALC-SCNC: 0.9 MMOL/L
BASOPHILS # BLD AUTO: 0.07 THOUSANDS/ÂΜL (ref 0–0.1)
BASOPHILS NFR BLD AUTO: 1 % (ref 0–1)
BILIRUB SERPL-MCNC: 1.07 MG/DL (ref 0.2–1)
BUN SERPL-MCNC: 8 MG/DL (ref 5–25)
CALCIUM SERPL-MCNC: 9.4 MG/DL (ref 8.4–10.2)
CARDIAC TROPONIN I PNL SERPL HS: 7 NG/L (ref ?–50)
CARDIAC TROPONIN I PNL SERPL HS: 7 NG/L (ref ?–50)
CHLORIDE SERPL-SCNC: 100 MMOL/L (ref 96–108)
CO2 SERPL-SCNC: 28 MMOL/L (ref 21–32)
CREAT SERPL-MCNC: 0.67 MG/DL (ref 0.6–1.3)
DME PARACHUTE DELIVERY DATE ACTUAL: NORMAL
DME PARACHUTE DELIVERY DATE EXPECTED: NORMAL
DME PARACHUTE DELIVERY DATE REQUESTED: NORMAL
DME PARACHUTE ITEM DESCRIPTION: NORMAL
DME PARACHUTE ORDER STATUS: NORMAL
DME PARACHUTE SUPPLIER NAME: NORMAL
DME PARACHUTE SUPPLIER PHONE: NORMAL
EOSINOPHIL # BLD AUTO: 0.15 THOUSAND/ÂΜL (ref 0–0.61)
EOSINOPHIL NFR BLD AUTO: 1 % (ref 0–6)
ERYTHROCYTE [DISTWIDTH] IN BLOOD BY AUTOMATED COUNT: 13.7 % (ref 11.6–15.1)
ERYTHROCYTE [DISTWIDTH] IN BLOOD BY AUTOMATED COUNT: 13.9 % (ref 11.6–15.1)
FLUAV RNA RESP QL NAA+PROBE: NEGATIVE
FLUBV RNA RESP QL NAA+PROBE: NEGATIVE
GFR SERPL CREATININE-BSD FRML MDRD: 96 ML/MIN/1.73SQ M
GLUCOSE SERPL-MCNC: 97 MG/DL (ref 65–140)
HCO3 BLDV-SCNC: 26.5 MMOL/L (ref 24–30)
HCT VFR BLD AUTO: 30 % (ref 34.8–46.1)
HCT VFR BLD AUTO: 32.5 % (ref 34.8–46.1)
HGB BLD-MCNC: 10 G/DL (ref 11.5–15.4)
HGB BLD-MCNC: 10.7 G/DL (ref 11.5–15.4)
IMM GRANULOCYTES # BLD AUTO: 0.03 THOUSAND/UL (ref 0–0.2)
IMM GRANULOCYTES NFR BLD AUTO: 0 % (ref 0–2)
INR PPP: 1.05 (ref 0.85–1.19)
LACTATE SERPL-SCNC: 1.3 MMOL/L (ref 0.5–2)
LYMPHOCYTES # BLD AUTO: 1.45 THOUSANDS/ÂΜL (ref 0.6–4.47)
LYMPHOCYTES NFR BLD AUTO: 12 % (ref 14–44)
MCH RBC QN AUTO: 31.8 PG (ref 26.8–34.3)
MCH RBC QN AUTO: 31.9 PG (ref 26.8–34.3)
MCHC RBC AUTO-ENTMCNC: 32.9 G/DL (ref 31.4–37.4)
MCHC RBC AUTO-ENTMCNC: 33.3 G/DL (ref 31.4–37.4)
MCV RBC AUTO: 96 FL (ref 82–98)
MCV RBC AUTO: 97 FL (ref 82–98)
MONOCYTES # BLD AUTO: 1.67 THOUSAND/ÂΜL (ref 0.17–1.22)
MONOCYTES NFR BLD AUTO: 14 % (ref 4–12)
NEUTROPHILS # BLD AUTO: 8.79 THOUSANDS/ÂΜL (ref 1.85–7.62)
NEUTS SEG NFR BLD AUTO: 72 % (ref 43–75)
NRBC BLD AUTO-RTO: 0 /100 WBCS
O2 CT BLDV-SCNC: 9.1 ML/DL
P AXIS: 73 DEGREES
PCO2 BLDV: 46.3 MM HG (ref 42–50)
PH BLDV: 7.38 [PH] (ref 7.3–7.4)
PLATELET # BLD AUTO: 376 THOUSANDS/UL (ref 149–390)
PLATELET # BLD AUTO: 403 THOUSANDS/UL (ref 149–390)
PMV BLD AUTO: 11.1 FL (ref 8.9–12.7)
PMV BLD AUTO: 11.3 FL (ref 8.9–12.7)
PO2 BLDV: 30.2 MM HG (ref 35–45)
POTASSIUM SERPL-SCNC: 3.6 MMOL/L (ref 3.5–5.3)
PR INTERVAL: 202 MS
PROCALCITONIN SERPL-MCNC: 0.26 NG/ML
PROT SERPL-MCNC: 7.1 G/DL (ref 6.4–8.4)
PROTHROMBIN TIME: 14.3 SECONDS (ref 12.3–15)
QRS AXIS: -35 DEGREES
QRSD INTERVAL: 102 MS
QT INTERVAL: 384 MS
QTC INTERVAL: 467 MS
RBC # BLD AUTO: 3.13 MILLION/UL (ref 3.81–5.12)
RBC # BLD AUTO: 3.36 MILLION/UL (ref 3.81–5.12)
RSV RNA RESP QL NAA+PROBE: NEGATIVE
SARS-COV-2 RNA RESP QL NAA+PROBE: NEGATIVE
SODIUM SERPL-SCNC: 137 MMOL/L (ref 135–147)
T WAVE AXIS: -15 DEGREES
VENTRICULAR RATE: 89 BPM
WBC # BLD AUTO: 12.16 THOUSAND/UL (ref 4.31–10.16)
WBC # BLD AUTO: 13.61 THOUSAND/UL (ref 4.31–10.16)

## 2025-08-06 PROCEDURE — 85730 THROMBOPLASTIN TIME PARTIAL: CPT | Performed by: EMERGENCY MEDICINE

## 2025-08-06 PROCEDURE — 80053 COMPREHEN METABOLIC PANEL: CPT | Performed by: EMERGENCY MEDICINE

## 2025-08-06 PROCEDURE — 85610 PROTHROMBIN TIME: CPT | Performed by: EMERGENCY MEDICINE

## 2025-08-06 PROCEDURE — 84484 ASSAY OF TROPONIN QUANT: CPT | Performed by: EMERGENCY MEDICINE

## 2025-08-06 PROCEDURE — 93010 ELECTROCARDIOGRAM REPORT: CPT | Performed by: INTERNAL MEDICINE

## 2025-08-06 PROCEDURE — 87040 BLOOD CULTURE FOR BACTERIA: CPT | Performed by: EMERGENCY MEDICINE

## 2025-08-06 PROCEDURE — 82805 BLOOD GASES W/O2 SATURATION: CPT | Performed by: EMERGENCY MEDICINE

## 2025-08-06 PROCEDURE — 99212 OFFICE O/P EST SF 10 MIN: CPT | Performed by: NURSE PRACTITIONER

## 2025-08-06 PROCEDURE — 96376 TX/PRO/DX INJ SAME DRUG ADON: CPT

## 2025-08-06 PROCEDURE — 71275 CT ANGIOGRAPHY CHEST: CPT

## 2025-08-06 PROCEDURE — 96374 THER/PROPH/DIAG INJ IV PUSH: CPT

## 2025-08-06 PROCEDURE — 84145 PROCALCITONIN (PCT): CPT | Performed by: EMERGENCY MEDICINE

## 2025-08-06 PROCEDURE — 85027 COMPLETE CBC AUTOMATED: CPT | Performed by: EMERGENCY MEDICINE

## 2025-08-06 PROCEDURE — 99285 EMERGENCY DEPT VISIT HI MDM: CPT | Performed by: EMERGENCY MEDICINE

## 2025-08-06 PROCEDURE — 99496 TRANSJ CARE MGMT HIGH F2F 7D: CPT | Performed by: INTERNAL MEDICINE

## 2025-08-06 PROCEDURE — 83605 ASSAY OF LACTIC ACID: CPT | Performed by: EMERGENCY MEDICINE

## 2025-08-06 PROCEDURE — 74177 CT ABD & PELVIS W/CONTRAST: CPT

## 2025-08-06 PROCEDURE — 36415 COLL VENOUS BLD VENIPUNCTURE: CPT | Performed by: EMERGENCY MEDICINE

## 2025-08-06 PROCEDURE — 85025 COMPLETE CBC W/AUTO DIFF WBC: CPT | Performed by: EMERGENCY MEDICINE

## 2025-08-06 PROCEDURE — 87637 SARSCOV2&INF A&B&RSV AMP PRB: CPT | Performed by: EMERGENCY MEDICINE

## 2025-08-06 RX ORDER — HYDROCODONE BITARTRATE AND ACETAMINOPHEN 5; 325 MG/1; MG/1
2 TABLET ORAL EVERY 6 HOURS PRN
Qty: 7 TABLET | Refills: 0 | Status: SHIPPED | OUTPATIENT
Start: 2025-08-06 | End: 2025-08-08

## 2025-08-06 RX ORDER — FENTANYL CITRATE 50 UG/ML
25 INJECTION, SOLUTION INTRAMUSCULAR; INTRAVENOUS ONCE
Refills: 0 | Status: COMPLETED | OUTPATIENT
Start: 2025-08-06 | End: 2025-08-06

## 2025-08-06 RX ORDER — FENTANYL CITRATE 50 UG/ML
25 INJECTION, SOLUTION INTRAMUSCULAR; INTRAVENOUS ONCE AS NEEDED
Refills: 0 | Status: DISCONTINUED | OUTPATIENT
Start: 2025-08-06 | End: 2025-08-06

## 2025-08-06 RX ORDER — FENTANYL CITRATE 50 UG/ML
50 INJECTION, SOLUTION INTRAMUSCULAR; INTRAVENOUS ONCE AS NEEDED
Refills: 0 | Status: COMPLETED | OUTPATIENT
Start: 2025-08-06 | End: 2025-08-06

## 2025-08-06 RX ADMIN — FENTANYL CITRATE 50 MCG: 0.05 INJECTION, SOLUTION INTRAMUSCULAR; INTRAVENOUS at 03:28

## 2025-08-06 RX ADMIN — IOHEXOL 100 ML: 350 INJECTION, SOLUTION INTRAVENOUS at 02:12

## 2025-08-06 RX ADMIN — FENTANYL CITRATE 50 MCG: 0.05 INJECTION, SOLUTION INTRAMUSCULAR; INTRAVENOUS at 05:17

## 2025-08-06 RX ADMIN — FENTANYL CITRATE 25 MCG: 0.05 INJECTION, SOLUTION INTRAMUSCULAR; INTRAVENOUS at 02:01

## 2025-08-07 ENCOUNTER — PATIENT OUTREACH (OUTPATIENT)
Dept: CASE MANAGEMENT | Facility: OTHER | Age: 60
End: 2025-08-07

## 2025-08-07 LAB
ANISOCYTOSIS BLD QL SMEAR: PRESENT
BASOPHILS # BLD MANUAL: 0 THOUSAND/UL (ref 0–0.1)
BASOPHILS NFR MAR MANUAL: 0 % (ref 0–1)
EOSINOPHIL # BLD MANUAL: 0.58 THOUSAND/UL (ref 0–0.4)
EOSINOPHIL NFR BLD MANUAL: 5 % (ref 0–6)
LYMPHOCYTES # BLD AUTO: 1.99 THOUSAND/UL (ref 0.6–4.47)
LYMPHOCYTES # BLD AUTO: 17 % (ref 14–44)
MONOCYTES # BLD AUTO: 1.99 THOUSAND/UL (ref 0–1.22)
MONOCYTES NFR BLD: 17 % (ref 4–12)
MYELOCYTE ABSOLUTE CT: 0.12 THOUSAND/UL (ref 0–0.1)
MYELOCYTES NFR BLD MANUAL: 1 % (ref 0–1)
NEUTROPHILS # BLD MANUAL: 7.01 THOUSAND/UL (ref 1.85–7.62)
NEUTS SEG NFR BLD AUTO: 60 % (ref 43–75)
PATHOLOGY REVIEW: YES
PLATELET BLD QL SMEAR: ADEQUATE
POIKILOCYTOSIS BLD QL SMEAR: PRESENT
RBC MORPH BLD: PRESENT
STOMATOCYTES BLD QL SMEAR: PRESENT

## 2025-08-07 PROCEDURE — 85060 BLOOD SMEAR INTERPRETATION: CPT | Performed by: STUDENT IN AN ORGANIZED HEALTH CARE EDUCATION/TRAINING PROGRAM

## 2025-08-07 PROCEDURE — 88305 TISSUE EXAM BY PATHOLOGIST: CPT | Performed by: PATHOLOGY

## 2025-08-08 ENCOUNTER — TELEPHONE (OUTPATIENT)
Dept: HEMATOLOGY ONCOLOGY | Facility: CLINIC | Age: 60
End: 2025-08-08

## 2025-08-10 LAB
BACTERIA BLD CULT: NORMAL
BACTERIA BLD CULT: NORMAL

## 2025-08-11 ENCOUNTER — TELEPHONE (OUTPATIENT)
Age: 60
End: 2025-08-11

## 2025-08-11 ENCOUNTER — HOSPITAL ENCOUNTER (OUTPATIENT)
Dept: ULTRASOUND IMAGING | Facility: CLINIC | Age: 60
Discharge: HOME | End: 2025-08-11
Attending: INTERNAL MEDICINE
Payer: COMMERCIAL

## 2025-08-11 ENCOUNTER — APPOINTMENT (OUTPATIENT)
Dept: LAB | Facility: CLINIC | Age: 60
End: 2025-08-11
Payer: COMMERCIAL

## 2025-08-11 LAB
BACTERIA BLD CULT: NORMAL
BACTERIA BLD CULT: NORMAL

## 2025-08-12 ENCOUNTER — PATIENT OUTREACH (OUTPATIENT)
Dept: CASE MANAGEMENT | Facility: OTHER | Age: 60
End: 2025-08-12

## 2025-08-13 ENCOUNTER — APPOINTMENT (OUTPATIENT)
Dept: LAB | Facility: HOSPITAL | Age: 60
End: 2025-08-13
Payer: COMMERCIAL

## 2025-08-13 DIAGNOSIS — C80.1 METASTASIS TO LIVER OF UNKNOWN ORIGIN (HCC): ICD-10-CM

## 2025-08-13 DIAGNOSIS — D50.9 IRON DEFICIENCY ANEMIA, UNSPECIFIED IRON DEFICIENCY ANEMIA TYPE: ICD-10-CM

## 2025-08-13 DIAGNOSIS — C78.7 METASTASIS TO LIVER OF UNKNOWN ORIGIN (HCC): ICD-10-CM

## 2025-08-13 LAB
BASOPHILS # BLD AUTO: 0.12 THOUSANDS/ÂΜL (ref 0–0.1)
BASOPHILS NFR BLD AUTO: 1 % (ref 0–1)
CANCER AG125 SERPL-ACNC: 80.9 U/ML (ref 0–35)
EOSINOPHIL # BLD AUTO: 0.32 THOUSAND/ÂΜL (ref 0–0.61)
EOSINOPHIL NFR BLD AUTO: 3 % (ref 0–6)
ERYTHROCYTE [DISTWIDTH] IN BLOOD BY AUTOMATED COUNT: 14 % (ref 11.6–15.1)
FERRITIN SERPL-MCNC: 171 NG/ML (ref 30–307)
HCT VFR BLD AUTO: 32.1 % (ref 34.8–46.1)
HGB BLD-MCNC: 10.6 G/DL (ref 11.5–15.4)
IMM GRANULOCYTES # BLD AUTO: 0.03 THOUSAND/UL (ref 0–0.2)
IMM GRANULOCYTES NFR BLD AUTO: 0 % (ref 0–2)
IRON SATN MFR SERPL: 17 % (ref 15–50)
IRON SERPL-MCNC: 55 UG/DL (ref 50–212)
LYMPHOCYTES # BLD AUTO: 1.59 THOUSANDS/ÂΜL (ref 0.6–4.47)
LYMPHOCYTES NFR BLD AUTO: 15 % (ref 14–44)
MCH RBC QN AUTO: 31.4 PG (ref 26.8–34.3)
MCHC RBC AUTO-ENTMCNC: 33 G/DL (ref 31.4–37.4)
MCV RBC AUTO: 95 FL (ref 82–98)
MONOCYTES # BLD AUTO: 1.39 THOUSAND/ÂΜL (ref 0.17–1.22)
MONOCYTES NFR BLD AUTO: 13 % (ref 4–12)
NEUTROPHILS # BLD AUTO: 6.91 THOUSANDS/ÂΜL (ref 1.85–7.62)
NEUTS SEG NFR BLD AUTO: 68 % (ref 43–75)
NRBC BLD AUTO-RTO: 0 /100 WBCS
PLATELET # BLD AUTO: 529 THOUSANDS/UL (ref 149–390)
PMV BLD AUTO: 11.3 FL (ref 8.9–12.7)
RBC # BLD AUTO: 3.38 MILLION/UL (ref 3.81–5.12)
TIBC SERPL-MCNC: 331.8 UG/DL (ref 250–450)
TRANSFERRIN SERPL-MCNC: 237 MG/DL (ref 203–362)
UIBC SERPL-MCNC: 277 UG/DL (ref 155–355)
WBC # BLD AUTO: 10.36 THOUSAND/UL (ref 4.31–10.16)

## 2025-08-13 PROCEDURE — 83550 IRON BINDING TEST: CPT

## 2025-08-13 PROCEDURE — 82728 ASSAY OF FERRITIN: CPT

## 2025-08-13 PROCEDURE — 86304 IMMUNOASSAY TUMOR CA 125: CPT

## 2025-08-13 PROCEDURE — 85025 COMPLETE CBC W/AUTO DIFF WBC: CPT

## 2025-08-13 PROCEDURE — 36415 COLL VENOUS BLD VENIPUNCTURE: CPT

## 2025-08-13 PROCEDURE — 83540 ASSAY OF IRON: CPT

## 2025-08-14 ENCOUNTER — HOSPITAL ENCOUNTER (OUTPATIENT)
Dept: PULMONOLOGY | Facility: HOSPITAL | Age: 60
End: 2025-08-14
Payer: COMMERCIAL

## 2025-08-14 ENCOUNTER — TELEMEDICINE (OUTPATIENT)
Age: 60
End: 2025-08-14
Payer: COMMERCIAL

## 2025-08-14 PROBLEM — R65.10 SIRS (SYSTEMIC INFLAMMATORY RESPONSE SYNDROME) (HCC): Status: ACTIVE | Noted: 2025-08-14

## 2025-08-14 PROBLEM — R07.89 OTHER CHEST PAIN: Status: ACTIVE | Noted: 2025-08-14

## 2025-08-15 PROBLEM — I42.8 NICM (NONISCHEMIC CARDIOMYOPATHY) (HCC): Status: ACTIVE | Noted: 2025-08-15

## 2025-08-15 PROBLEM — Z86.711 HISTORY OF PULMONARY EMBOLUS (PE): Status: ACTIVE | Noted: 2025-08-15

## 2025-08-15 PROBLEM — J96.21 ACUTE ON CHRONIC RESPIRATORY FAILURE WITH HYPOXIA (HCC): Status: ACTIVE | Noted: 2025-08-15

## 2025-08-15 PROBLEM — R07.9 CHEST PAIN: Status: ACTIVE | Noted: 2025-08-14

## 2025-08-15 PROBLEM — R94.39 ABNORMAL CARDIOVASCULAR STRESS TEST: Status: ACTIVE | Noted: 2025-08-15

## 2025-08-15 PROBLEM — E78.5 DYSLIPIDEMIA: Status: ACTIVE | Noted: 2025-08-15

## 2025-08-15 PROBLEM — Z85.71 HISTORY OF HODGKIN'S LYMPHOMA: Status: ACTIVE | Noted: 2025-08-15

## 2025-08-15 PROBLEM — Z51.5 PALLIATIVE CARE BY SPECIALIST: Status: ACTIVE | Noted: 2025-08-15

## 2025-08-15 PROBLEM — I25.10 CORONARY ARTERY CALCIFICATION SEEN ON CAT SCAN: Status: ACTIVE | Noted: 2025-08-15

## 2025-08-17 PROBLEM — R74.01 TRANSAMINITIS: Status: ACTIVE | Noted: 2025-08-17

## 2025-08-18 ENCOUNTER — HOSPITAL ENCOUNTER (OUTPATIENT)
Dept: MRI IMAGING | Facility: HOSPITAL | Age: 60
Discharge: HOME | End: 2025-08-18

## 2025-08-18 PROBLEM — R65.10 SIRS (SYSTEMIC INFLAMMATORY RESPONSE SYNDROME) (HCC): Status: RESOLVED | Noted: 2025-08-14 | Resolved: 2025-08-18

## 2025-08-18 PROBLEM — R65.10 SIRS (SYSTEMIC INFLAMMATORY RESPONSE SYNDROME) (HCC): Status: ACTIVE | Noted: 2025-08-18

## 2025-08-18 PROBLEM — E87.1 HYPONATREMIA: Status: ACTIVE | Noted: 2025-08-18

## 2025-08-18 PROBLEM — A41.9 SEPSIS WITHOUT ACUTE ORGAN DYSFUNCTION (HCC): Status: ACTIVE | Noted: 2025-08-18

## 2025-08-18 LAB — SCAN RESULT: NORMAL

## 2025-08-20 ENCOUNTER — TRANSITIONAL CARE MANAGEMENT (OUTPATIENT)
Age: 60
End: 2025-08-20

## 2025-08-20 ENCOUNTER — HOME CARE VISIT (OUTPATIENT)
Dept: HOME HEALTH SERVICES | Facility: HOME HEALTHCARE | Age: 60
End: 2025-08-20
Payer: COMMERCIAL

## 2025-08-20 DIAGNOSIS — F51.01 PRIMARY INSOMNIA: ICD-10-CM

## 2025-08-20 RX ORDER — ZALEPLON 5 MG/1
5 CAPSULE ORAL
Qty: 30 CAPSULE | Refills: 0 | Status: SHIPPED | OUTPATIENT
Start: 2025-08-20

## 2025-08-21 ENCOUNTER — HOME CARE VISIT (OUTPATIENT)
Dept: HOME HEALTH SERVICES | Facility: HOME HEALTHCARE | Age: 60
End: 2025-08-21
Payer: COMMERCIAL

## 2025-08-21 ENCOUNTER — HOME CARE VISIT (OUTPATIENT)
Dept: HOME HEALTH SERVICES | Facility: HOME HEALTHCARE | Age: 60
End: 2025-08-21
Attending: INTERNAL MEDICINE
Payer: COMMERCIAL

## 2025-08-21 VITALS
SYSTOLIC BLOOD PRESSURE: 124 MMHG | HEART RATE: 85 BPM | DIASTOLIC BLOOD PRESSURE: 64 MMHG | TEMPERATURE: 96.1 F | RESPIRATION RATE: 16 BRPM

## 2025-08-21 PROCEDURE — G0155 HHCP-SVS OF CSW,EA 15 MIN: HCPCS

## 2025-08-21 PROCEDURE — G0299 HHS/HOSPICE OF RN EA 15 MIN: HCPCS

## 2025-08-22 ENCOUNTER — DOCUMENTATION (OUTPATIENT)
Dept: HEMATOLOGY ONCOLOGY | Facility: CLINIC | Age: 60
End: 2025-08-22

## 2025-08-22 ENCOUNTER — HOME CARE VISIT (OUTPATIENT)
Dept: HOME HOSPICE | Facility: HOSPICE | Age: 60
End: 2025-08-22
Payer: COMMERCIAL

## 2025-08-22 ENCOUNTER — PATIENT OUTREACH (OUTPATIENT)
Dept: HEMATOLOGY ONCOLOGY | Facility: CLINIC | Age: 60
End: 2025-08-22